# Patient Record
Sex: FEMALE | Race: WHITE | Employment: FULL TIME | URBAN - METROPOLITAN AREA
[De-identification: names, ages, dates, MRNs, and addresses within clinical notes are randomized per-mention and may not be internally consistent; named-entity substitution may affect disease eponyms.]

---

## 2017-01-06 DIAGNOSIS — G43.109 MIGRAINE WITH AURA AND WITHOUT STATUS MIGRAINOSUS, NOT INTRACTABLE: Primary | ICD-10-CM

## 2017-01-06 RX ORDER — FROVATRIPTAN SUCCINATE 2.5 MG/1
2.5 TABLET, FILM COATED ORAL
Qty: 9 TAB | Refills: 5 | Status: SHIPPED | OUTPATIENT
Start: 2017-01-06 | End: 2020-01-07 | Stop reason: ALTCHOICE

## 2017-01-17 ENCOUNTER — DOCUMENTATION ONLY (OUTPATIENT)
Dept: NEUROLOGY | Age: 42
End: 2017-01-17

## 2017-01-17 ENCOUNTER — OFFICE VISIT (OUTPATIENT)
Dept: NEUROLOGY | Age: 42
End: 2017-01-17

## 2017-01-17 VITALS
DIASTOLIC BLOOD PRESSURE: 68 MMHG | HEIGHT: 63 IN | HEART RATE: 104 BPM | WEIGHT: 166 LBS | BODY MASS INDEX: 29.41 KG/M2 | OXYGEN SATURATION: 98 % | SYSTOLIC BLOOD PRESSURE: 130 MMHG

## 2017-01-17 DIAGNOSIS — G43.009 MIGRAINE WITHOUT AURA AND WITHOUT STATUS MIGRAINOSUS, NOT INTRACTABLE: Primary | ICD-10-CM

## 2017-01-17 DIAGNOSIS — T39.95XA ANALGESIC OVERUSE HEADACHE: ICD-10-CM

## 2017-01-17 DIAGNOSIS — F90.9 ATTENTION DEFICIT HYPERACTIVITY DISORDER (ADHD), UNSPECIFIED ADHD TYPE: ICD-10-CM

## 2017-01-17 DIAGNOSIS — G44.40 ANALGESIC OVERUSE HEADACHE: ICD-10-CM

## 2017-01-17 RX ORDER — BUTALBITAL, ACETAMINOPHEN AND CAFFEINE 50; 325; 40 MG/1; MG/1; MG/1
1 TABLET ORAL
Qty: 20 TAB | Refills: 5 | Status: SHIPPED | OUTPATIENT
Start: 2017-01-17 | End: 2018-08-14 | Stop reason: SDUPTHER

## 2017-01-17 RX ORDER — METHYLPHENIDATE HYDROCHLORIDE 5 MG/1
5 TABLET ORAL 2 TIMES DAILY
Qty: 60 TAB | Refills: 0 | Status: SHIPPED | OUTPATIENT
Start: 2017-01-17 | End: 2017-03-20 | Stop reason: SDUPTHER

## 2017-01-17 NOTE — PATIENT INSTRUCTIONS
[facility] [place] [facility]   Statement to Patients  April 1, 2014      In an effort to ensure the large volume of patient prescription refills is processed in the most efficient and expeditious manner, we are asking our patients to assist us by calling your Pharmacy for all prescription refills, this will include also your  Mail Order Pharmacy. The pharmacy will contact our office electronically to continue the refill process. Please do not wait until the last minute to call your pharmacy. We need at least 48 hours (2days) to fill prescriptions. We also encourage you to call your pharmacy before going to  your prescription to make sure it is ready. With regard to controlled substance prescription refill requests (narcotic refills) that need to be picked up at our office, we ask your cooperation by providing us with at least 72 hours (3days) notice that you will need a refill. We will not refill narcotic prescription refill requests after 4:00pm on any weekday, Monday through Thursday, or after 2:00pm on Fridays, or on the weekends. We encourage everyone to explore another way of getting your prescription refill request processed using Make Meaning, our patient web portal through our electronic medical record system. Make Meaning is an efficient and effective way to communicate your medication request directly to the office and  downloadable as an feng on your smart phone . Make Meaning also features a review functionality that allows you to view your medication list as well as leave messages for your physician. Are you ready to get connected? If so please review the attatched instructions or speak to any of our staff to get you set up right away! Thank you so much for your cooperation. Should you have any questions please contact our Practice Administrator.     The Physicians and Staff,  [facility]          A Healthy Lifestyle: Care Instructions  Your Care Instructions  A healthy lifestyle can help you feel good, stay at a healthy weight, and have plenty of energy for both work and play. A healthy lifestyle is something you can share with your whole family. A healthy lifestyle also can lower your risk for serious health problems, such as high blood pressure, heart disease, and diabetes. You can follow a few steps listed below to improve your health and the health of your family. Follow-up care is a key part of your treatment and safety. Be sure to make and go to all appointments, and call your doctor if you are having problems. Its also a good idea to know your test results and keep a list of the medicines you take. How can you care for yourself at home? · Do not eat too much sugar, fat, or fast foods. You can still have dessert and treats now and then. The goal is moderation. · Start small to improve your eating habits. Pay attention to portion sizes, drink less juice and soda pop, and eat more fruits and vegetables. ¨ Eat a healthy amount of food. A 3-ounce serving of meat, for example, is about the size of a deck of cards. Fill the rest of your plate with vegetables and whole grains. ¨ Limit the amount of soda and sports drinks you have every day. Drink more water when you are thirsty. ¨ Eat at least 5 servings of fruits and vegetables every day. It may seem like a lot, but it is not hard to reach this goal. A serving or helping is 1 piece of fruit, 1 cup of vegetables, or 2 cups of leafy, raw vegetables. Have an apple or some carrot sticks as an afternoon snack instead of a candy bar. Try to have fruits and/or vegetables at every meal.  · Make exercise part of your daily routine. You may want to start with simple activities, such as walking, bicycling, or slow swimming. Try to be active 30 to 60 minutes every day. You do not need to do all 30 to 60 minutes all at once. For example, you can exercise 3 times a day for 10 or 20 minutes.  Moderate exercise is safe for most people, but it is always a good idea to talk to your doctor before starting an exercise program.  · Keep moving. Mauricio Sanderster the lawn, work in the garden, or [employer]. Take the stairs instead of the elevator at work. · If you smoke, quit. People who smoke have an increased risk for heart attack, stroke, cancer, and other lung illnesses. Quitting is hard, but there are ways to boost your chance of quitting tobacco for good. ¨ Use nicotine gum, patches, or lozenges. ¨ Ask your doctor about stop-smoking programs and medicines. ¨ Keep trying. In addition to reducing your risk of diseases in the future, you will notice some benefits soon after you stop using tobacco. If you have shortness of breath or asthma symptoms, they will likely get better within a few weeks after you quit. · Limit how much alcohol you drink. Moderate amounts of alcohol (up to 2 drinks a day for men, 1 drink a day for women) are okay. But drinking too much can lead to liver problems, high blood pressure, and other health problems. Family health  If you have a family, there are many things you can do together to improve your health. · Eat meals together as a family as often as possible. · Eat healthy foods. This includes fruits, vegetables, lean meats and dairy, and whole grains. · Include your family in your fitness plan. Most people think of activities such as jogging or tennis as the way to fitness, but there are many ways you and your family can be more active. Anything that makes you breathe hard and gets your heart pumping is exercise. Here are some tips:  ¨ Walk to do errands or to take your child to school or the bus. ¨ Go for a family bike ride after dinner instead of watching TV. Where can you learn more? Go to http://yoly-chelsea.info/. Enter D088 in the search box to learn more about \"A Healthy Lifestyle: Care Instructions. \"  Current as of: July 26, 2016  Content Version: 11.1  © 8582-6445 [employer]. Care instructions adapted under license by [employer]) (which disclaims liability or warranty for this information). If you have questions about a medical condition or this instruction, always ask your healthcare professional. [address] any warranty or liability for your use of this information.

## 2017-01-17 NOTE — MR AVS SNAPSHOT
Visit Information Date & Time Provider Department Dept. Phone Encounter #  
 1/17/2017  2:00 PM Chandra Murray NP [facility] [phone] [id number] Follow-up Instructions Return in about 4 weeks (around 2/14/2017). Upcoming Health Maintenance Date Due DTaP/Tdap/Td series (1 - Tdap) 9/2/1998 PAP AKA CERVICAL CYTOLOGY 9/2/1998 INFLUENZA AGE 9 TO ADULT 8/1/2016 Allergies as of 1/17/2017  Review Complete On: 1/17/2017 By: Ines Higgins LPN Severity Noted Reaction Type Reactions Erythromycin  12/17/2010    Nausea and Vomiting Current Immunizations  Reviewed on 12/19/2012 No immunizations on file. Not reviewed this visit You Were Diagnosed With   
  
 Codes Comments Attention deficit hyperactivity disorder (ADHD), unspecified ADHD type     ICD-10-CM: F90.9 ICD-9-CM: 314.01 Vitals BP Pulse Height(growth percentile) Weight(growth percentile) SpO2 BMI  
 130/68 (!) 104 5' 3\" (1.6 m) 166 lb (75.3 kg) 98% 29.41 kg/m2 OB Status Smoking Status Hysterectomy Former Smoker Vitals History BMI and BSA Data Body Mass Index Body Surface Area  
 29.41 kg/m 2 1.83 m 2 Preferred Pharmacy Pharmacy Name Phone [facility] [place], [facility]. [phone] Your Updated Medication List  
  
   
This list is accurate as of: 1/17/17  2:24 PM.  Always use your most recent med list.  
  
  
  
  
 butalbital-acetaminophen-caffeine -40 mg per tablet Commonly known as:  Sid Gift Take 1 Tab by mouth every six (6) hours as needed for Pain. Max Daily Amount: 2 Tabs. cetirizine 10 mg tablet Commonly known as:  ZYRTEC Take  by mouth daily. FERATE 240 mg (27 mg iron) tablet Generic drug:  ferrous gluconate Take 240 mg by mouth daily. frovatriptan 2.5 mg tablet Commonly known as:  Adele Vasquez Take 1 Tab by mouth once as needed for Migraine. Can take a second dose two hours later, max 2 daily  
  
 gabapentin 600 mg tablet Commonly known as:  NEURONTIN Take 1 Tab by mouth three (3) times daily. hydroCHLOROthiazide 25 mg tablet Commonly known as:  HYDRODIURIL Take 25 mg by mouth daily. methylphenidate 5 mg tablet Commonly known as:  RITALIN Take 1 Tab (5 mg total) by mouth two (2) times a day. Max Daily Amount: 10 mg PREMARIN 1.25 mg tablet Generic drug:  conjugated estrogens Take 1.25 mg by mouth daily. traMADol 300 mg tablet Commonly known as:  ULTRAM-ER Take 300 mg by mouth daily. venlafaxine- mg capsule Commonly known as:  EFFEXOR-XR Take 1 Cap by mouth daily (with breakfast). Prescriptions Printed Refills  
 methylphenidate (RITALIN) 5 mg tablet 0 Sig: Take 1 Tab (5 mg total) by mouth two (2) times a day. Max Daily Amount: 10 mg  
 Class: Print Route: Oral  
 butalbital-acetaminophen-caffeine (FIORICET, ESGIC) -40 mg per tablet 5 Sig: Take 1 Tab by mouth every six (6) hours as needed for Pain. Max Daily Amount: 2 Tabs. Class: Print Route: Oral  
  
Follow-up Instructions Return in about 4 weeks (around 2/14/2017). Patient Instructions PRESCRIPTION REFILL POLICY [facility] Statement to Patients April 1, 2014 In an effort to ensure the large volume of patient prescription refills is processed in the most efficient and expeditious manner, we are asking our patients to assist us by calling your Pharmacy for all prescription refills, this will include also your  Mail Order Pharmacy. The pharmacy will contact our office electronically to continue the refill process. Please do not wait until the last minute to call your pharmacy. We need at least 48 hours (2days) to fill prescriptions.  We also encourage you to call your pharmacy before going to  your prescription to make sure it is ready. With regard to controlled substance prescription refill requests (narcotic refills) that need to be picked up at our office, we ask your cooperation by providing us with at least 72 hours (3days) notice that you will need a refill. We will not refill narcotic prescription refill requests after 4:00pm on any weekday, Monday through Thursday, or after 2:00pm on Fridays, or on the weekends. We encourage everyone to explore another way of getting your prescription refill request processed using Zoomorama, our patient web portal through our electronic medical record system. Zoomorama is an efficient and effective way to communicate your medication request directly to the office and  downloadable as an feng on your smart phone . Zoomorama also features a review functionality that allows you to view your medication list as well as leave messages for your physician. Are you ready to get connected? If so please review the attatched instructions or speak to any of our staff to get you set up right away! Thank you so much for your cooperation. Should you have any questions please contact our Practice Administrator. The Physicians and Staff,  [facility] A Healthy Lifestyle: Care Instructions Your Care Instructions A healthy lifestyle can help you feel good, stay at a healthy weight, and have plenty of energy for both work and play. A healthy lifestyle is something you can share with your whole family. A healthy lifestyle also can lower your risk for serious health problems, such as high blood pressure, heart disease, and diabetes. You can follow a few steps listed below to improve your health and the health of your family. Follow-up care is a key part of your treatment and safety.  Be sure to make and go to all appointments, and call your doctor if you are having problems. Its also a good idea to know your test results and keep a list of the medicines you take. How can you care for yourself at home? · Do not eat too much sugar, fat, or fast foods. You can still have dessert and treats now and then. The goal is moderation. · Start small to improve your eating habits. Pay attention to portion sizes, drink less juice and soda pop, and eat more fruits and vegetables. ¨ Eat a healthy amount of food. A 3-ounce serving of meat, for example, is about the size of a deck of cards. Fill the rest of your plate with vegetables and whole grains. ¨ Limit the amount of soda and sports drinks you have every day. Drink more water when you are thirsty. ¨ Eat at least 5 servings of fruits and vegetables every day. It may seem like a lot, but it is not hard to reach this goal. A serving or helping is 1 piece of fruit, 1 cup of vegetables, or 2 cups of leafy, raw vegetables. Have an apple or some carrot sticks as an afternoon snack instead of a candy bar. Try to have fruits and/or vegetables at every meal. 
· Make exercise part of your daily routine. You may want to start with simple activities, such as walking, bicycling, or slow swimming. Try to be active 30 to 60 minutes every day. You do not need to do all 30 to 60 minutes all at once. For example, you can exercise 3 times a day for 10 or 20 minutes. Moderate exercise is safe for most people, but it is always a good idea to talk to your doctor before starting an exercise program. 
· Keep moving. Olga Lidia Boys the lawn, work in the garden, or [employer]. Take the stairs instead of the elevator at work. · If you smoke, quit. People who smoke have an increased risk for heart attack, stroke, cancer, and other lung illnesses. Quitting is hard, but there are ways to boost your chance of quitting tobacco for good. ¨ Use nicotine gum, patches, or lozenges. ¨ Ask your doctor about stop-smoking programs and medicines. ¨ Keep trying. In addition to reducing your risk of diseases in the future, you will notice some benefits soon after you stop using tobacco. If you have shortness of breath or asthma symptoms, they will likely get better within a few weeks after you quit. · Limit how much alcohol you drink. Moderate amounts of alcohol (up to 2 drinks a day for men, 1 drink a day for women) are okay. But drinking too much can lead to liver problems, high blood pressure, and other health problems. Family health If you have a family, there are many things you can do together to improve your health. · Eat meals together as a family as often as possible. · Eat healthy foods. This includes fruits, vegetables, lean meats and dairy, and whole grains. · Include your family in your fitness plan. Most people think of activities such as jogging or tennis as the way to fitness, but there are many ways you and your family can be more active. Anything that makes you breathe hard and gets your heart pumping is exercise. Here are some tips: 
¨ Walk to do errands or to take your child to school or the bus. ¨ Go for a family bike ride after dinner instead of watching TV. Where can you learn more? Go to http://yoly-chelsea.info/. Enter U110 in the search box to learn more about \"A Healthy Lifestyle: Care Instructions. \" Current as of: July 26, 2016 Content Version: 11.1 © 1117-3963 [employer]. Care instructions adapted under license by [employer] (which disclaims liability or warranty for this information). If you have questions about a medical condition or this instruction, always ask your healthcare professional. [place] [address] any warranty or liability for your use of this information. Introducing [facility]! Dear Milady Sep: Thank you for requesting a [employer] account. Our records indicate that you already have an active [employer] account.   You can access your account anytime at [email or web address] Did you know that you can access your hospital and ER discharge instructions at any time in SimpleMist? You can also review all of your test results from your hospital stay or ER visit. Additional Information If you have questions, please visit the Frequently Asked Questions section of the SimpleMist website at [email or web address] Remember, SimpleMist is NOT to be used for urgent needs. For medical emergencies, dial 911. Now available from your iPhone and Android! Please provide this summary of care documentation to your next provider. Your primary care clinician is listed as TYREE Steele. If you have any questions after today's visit, please call [phone].

## 2017-01-17 NOTE — PROGRESS NOTES
Date:            2017    Name:  Rox Beatty  :  1975  MRN:  [id number]     PCP:  Rustam Padilla MD    Chief Complaint   Patient presents with    Follow-up    Migraine         HISTORY OF PRESENT ILLNESS:  Yanelis West is a 46 y.o., female who presents today for follow up for migraines. She has switched to frova for abortive and think that it works better than relpax did. She has had 13 headache days this month. She took Relpax for the first 12 days month because she had a headache every day. When she ran out she started Frova, and has not had a headache since. She increased her effexor, does feel that she is nicer since starting that. Thinks that she is less stressed. 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 She does not always sleep well, she takes trazodone to help her sleep if she has trouble, does not take it every night. Pain always starts in her neck, and she has that pain today so she is concerned that she has a headache coming. She previously took fioricet as a secondary if her triptan did not work, knows that overuse gave her rebound. She continues to have good control of her attention issues with Ritalin 5 mg twice daily, would like to continue that prescription. Patient never had headaches before she had a hysterectomy , but admits that she has been on high dose of Premarin ever since because of mood issues after the hysterectomy. 11.15.2016 miesha Bowers is a 46 y.o., female who presents today for follow up for migraines. Previously these were well controlled but she feels that these have been getting worse, she wonders if this is seasonal.  Up until recently, she had one per week, now she has 2-3 per week. She will take a relpax, which works, and then headache often returns 24-36 hours later. She has been in the ER frequently over the last few months because she doesn't get enough relpax.  She does not sleep well, feels that this triggers her headaches. Migraines weren't really an issue until after she get a hysterectomy. Does take premarin, but did not start this right after her hysterectomy does not think that it causes her headaches. She tried topamax, but it gave her significant STM issues. She thinks that she tried amitriptyline and that it didn't help. Has not tried propranolol or other BP medicine, but BP is not high enough today to try one. Does have work related stress, she is an . She also has ADHD, takes ritalin which she feels is managing that. She did not tolerate the Adderall, it made her feel too slow. Agrees that she might benefit from CBT but doesn't have time for it. She was sent for physical therapy for lower back pain, but has not had time to schedule that. Current Outpatient Prescriptions   Medication Sig    frovatriptan (FROVA) 2.5 mg tablet Take 1 Tab by mouth once as needed for Migraine. Can take a second dose two hours later, max 2 daily    cetirizine (ZYRTEC) 10 mg tablet Take  by mouth daily.  ferrous gluconate (FERATE) 240 mg (27 mg iron) tablet Take 240 mg by mouth daily.  traMADol (ULTRAM-ER) 300 mg tablet Take 300 mg by mouth daily.  hydroCHLOROthiazide (HYDRODIURIL) 25 mg tablet Take 25 mg by mouth daily.  methylphenidate (RITALIN) 5 mg tablet Take 1 Tab by mouth two (2) times a day. Max Daily Amount: 10 mg.    venlafaxine-SR (EFFEXOR-XR) 150 mg capsule Take 1 Cap by mouth daily (with breakfast).  conjugated estrogens (PREMARIN) 1.25 mg tablet Take 1.25 mg by mouth daily.  gabapentin (NEURONTIN) 600 mg tablet Take 1 Tab by mouth three (3) times daily. No current facility-administered medications for this visit.       Allergies   Allergen Reactions    Erythromycin Nausea and Vomiting     Past Medical History   Diagnosis Date    Anxiety     Arthritis      back    Asthma      has not required tx in past 10 years    Chronic pain      lumbar    Dyspepsia and other specified disorders of function of stomach     Hypertension     Migraine     Nausea & vomiting      Past Surgical History   Procedure Laterality Date    Hx gyn       hysterectomy    Hx gi  12/18/12      Laparoscopy, open right colectomy    Hx urological       bladder sling 2014 and jan 19 2015    Pr abdomen surgery proc unlisted       bowel resection 2012     Social History     Social History    Marital status:      Spouse name: N/A    Number of children: N/A    Years of education: N/A     Occupational History    Not on file. Social History Main Topics    Smoking status: Former Smoker    Smokeless tobacco: Not on file    Alcohol use No    Drug use: No    Sexual activity: Not on file     Other Topics Concern    Not on file     Social History Narrative     Family History   Problem Relation Age of Onset    Hypertension Mother     Heart Disease Maternal Grandmother     Heart Disease Maternal Grandfather     Stroke Maternal Grandfather     Cancer Maternal Grandfather      colon, prostate    Cancer Paternal Grandmother      ovarian         PHYSICAL EXAMINATION:    Visit Vitals    Ht 5' 3\" (1.6 m)    Wt 166 lb (75.3 kg)    BMI 29.41 kg/m2     General:  Well defined, nourished, and groomed individual in no acute distress. Neck: Supple, nontender, no bruits, no pain with resistance to active range of motion. Heart: Regular rate and rhythm, no murmurs, rub, or gallop. Normal S1S2. Lungs:  Clear to auscultation bilaterally with equal chest expansion, no cough, no wheeze  Musculoskeletal:  Extremities revealed no edema and had full range of motion of joints. Psych:  Good mood and bright affect    NEUROLOGICAL EXAMINATION:     Mental Status:   Alert and oriented to person, place, and time with recent and remote memory intact. Attention span and concentration are normal. Speech is fluent with a full fund of knowledge.       Cranial Nerves:    II, III, IV, VI: Visual acuity grossly intact. Pupils are equal, round, and reactive to light. Extra-ocular movements are full and fluid. No ptosis or nystagmus. V-XII: Hearing is grossly intact. Facial features are symmetric, with normal sensation and strength. The palate rises symmetrically and the tongue protrudes midline. Sternocleidomastoids 5/5. Motor Examination: Normal tone, bulk, and strength, 5/5 muscle strength throughout. Coordination:  Finger to nose testing was normal.   No resting or intention tremor  Gait and Station:  Steady while walking and with tandem walking. Normal arm swing. No pronator drift. No muscle wasting or fasciculations noted. ASSESSMENT AND PLAN    ICD-10-CM ICD-9-CM    1. Migraine without aura and without status migrainosus, not intractable G43.009 346.10    2. Attention deficit hyperactivity disorder (ADHD), unspecified ADHD type F90.9 314.01 methylphenidate (RITALIN) 5 mg tablet   3. Analgesic overuse headache T39.91XA 339.3     G44.40 E80.9      42-year-old female seen in follow-up for migraines. Relpax was unsuccessful abortive therapy, but she switch to Frova earlier this month and is found that to be more effective. With her Relpax she would find that her headache returned the following day. Since taking Frova 1 week ago, she has not had return of her headache. She increase her Effexor to 150 mg daily for both anxiety and headache prevention, does feel that it has made her more pleasant and less stressed. Admits that she does not always sleep well, but does take trazodone as needed for that. Attention issues are well controlled with current dose of Ritalin. She knows that she is tried Fioricet in the past for abortive therapy when her triptan did not work, would like to try that again. 1.  Continue Frova 2.5 mg for migraine abortive, advised patient that she can also take 440 mg of naproxen with her Frova to help her be more effective.   If that fails, patient can use Fioricet as a second medication. Advised her to limit use to no more than 5 days per month to avoid rebound headache. 2.  Continue Effexor 150 mg XR daily for both anxiety and headache prevention  3. Discussed other preventative options if we are unable to get her headaches under control, will try low-dose propranolol next as patient has not tried in the past  4. Discussed that she may benefit from Botox if she fails another preventative line and continues to have 9+ headache days per month  5. Continue Ritalin 5 mg twice daily, patient was on this dose with Dr. Kennedy Even in his prior practice  6. Patient will discuss with her gynecologist if she could lower her dose of Premarin, discussed that this may be causing her worsening her headaches  7.   Encourage patient to continue conservative management techniques, sleep hygiene is important, should schedule trigger point massage for neck tension    Follow-up in 4 weeks, call sooner with concerns    Francisco Jara NP

## 2017-03-15 DIAGNOSIS — G43.009 MIGRAINE WITHOUT AURA AND WITHOUT STATUS MIGRAINOSUS, NOT INTRACTABLE: ICD-10-CM

## 2017-03-15 DIAGNOSIS — F41.9 ANXIETY: ICD-10-CM

## 2017-03-15 NOTE — TELEPHONE ENCOUNTER
Requested Prescriptions     Pending Prescriptions Disp Refills    venlafaxine-SR (EFFEXOR-XR) 150 mg capsule 90 Cap 3     Sig: Take 1 Cap by mouth daily (with breakfast). Pharmacy would like 90 day prescription.

## 2017-03-16 RX ORDER — VENLAFAXINE HYDROCHLORIDE 150 MG/1
150 CAPSULE, EXTENDED RELEASE ORAL
Qty: 90 CAP | Refills: 3 | Status: SHIPPED | OUTPATIENT
Start: 2017-03-16 | End: 2017-05-17 | Stop reason: SDUPTHER

## 2017-03-20 ENCOUNTER — OFFICE VISIT (OUTPATIENT)
Dept: NEUROLOGY | Age: 42
End: 2017-03-20

## 2017-03-20 VITALS
BODY MASS INDEX: 29.59 KG/M2 | OXYGEN SATURATION: 97 % | HEART RATE: 91 BPM | HEIGHT: 63 IN | WEIGHT: 167 LBS | SYSTOLIC BLOOD PRESSURE: 112 MMHG | DIASTOLIC BLOOD PRESSURE: 64 MMHG

## 2017-03-20 DIAGNOSIS — G43.009 MIGRAINE WITHOUT AURA AND WITHOUT STATUS MIGRAINOSUS, NOT INTRACTABLE: Primary | ICD-10-CM

## 2017-03-20 DIAGNOSIS — F90.9 ATTENTION DEFICIT HYPERACTIVITY DISORDER (ADHD), UNSPECIFIED ADHD TYPE: ICD-10-CM

## 2017-03-20 RX ORDER — METHYLPHENIDATE HYDROCHLORIDE 5 MG/1
5 TABLET ORAL 2 TIMES DAILY
Qty: 60 TAB | Refills: 0 | Status: SHIPPED | OUTPATIENT
Start: 2017-03-20 | End: 2017-04-18 | Stop reason: SDUPTHER

## 2017-03-20 RX ORDER — PROPRANOLOL HYDROCHLORIDE 10 MG/1
10 TABLET ORAL 2 TIMES DAILY
Qty: 90 TAB | Refills: 5 | Status: SHIPPED | OUTPATIENT
Start: 2017-03-20 | End: 2017-05-09 | Stop reason: SDUPTHER

## 2017-03-20 NOTE — PATIENT INSTRUCTIONS
Propranolol (Inderal) 10 mg tabs: Take 1 tab at bedtime for 1 week, take 1 tab twice a day. If tolerated, you can increase to 1 tab 3 times a day    Check your blood pressure and heart rate when taking this medication, if your blood pressure is getting low and especially if you are getting dizzy call our office to discuss adjusting your dose. You can also call your PCP to ask whether you need to stay on hydrochlorothiazide when taking this    [facility]   Statement to Patients  April 1, 2014      In an effort to ensure the large volume of patient prescription refills is processed in the most efficient and expeditious manner, we are asking our patients to assist us by calling your Pharmacy for all prescription refills, this will include also your  Mail Order Pharmacy. The pharmacy will contact our office electronically to continue the refill process. Please do not wait until the last minute to call your pharmacy. We need at least 48 hours (2days) to fill prescriptions. We also encourage you to call your pharmacy before going to  your prescription to make sure it is ready. With regard to controlled substance prescription refill requests (narcotic refills) that need to be picked up at our office, we ask your cooperation by providing us with at least 72 hours (3days) notice that you will need a refill. We will not refill narcotic prescription refill requests after 4:00pm on any weekday, Monday through Thursday, or after 2:00pm on Fridays, or on the weekends. We encourage everyone to explore another way of getting your prescription refill request processed using [employer], our patient web portal through our electronic medical record system. [employer] is an efficient and effective way to communicate your medication request directly to the office and  downloadable as an feng on your smart phone .  [employer] also features a review functionality that allows you to view your medication list as well as leave messages for your physician. Are you ready to get connected? If so please review the attatched instructions or speak to any of our staff to get you set up right away! Thank you so much for your cooperation. Should you have any questions please contact our Practice Administrator. The Physicians and Staff,  [facility]          A Healthy Lifestyle: Care Instructions  Your Care Instructions  A healthy lifestyle can help you feel good, stay at a healthy weight, and have plenty of energy for both work and play. A healthy lifestyle is something you can share with your whole family. A healthy lifestyle also can lower your risk for serious health problems, such as high blood pressure, heart disease, and diabetes. You can follow a few steps listed below to improve your health and the health of your family. Follow-up care is a key part of your treatment and safety. Be sure to make and go to all appointments, and call your doctor if you are having problems. Its also a good idea to know your test results and keep a list of the medicines you take. How can you care for yourself at home? · Do not eat too much sugar, fat, or fast foods. You can still have dessert and treats now and then. The goal is moderation. · Start small to improve your eating habits. Pay attention to portion sizes, drink less juice and soda pop, and eat more fruits and vegetables. ¨ Eat a healthy amount of food. A 3-ounce serving of meat, for example, is about the size of a deck of cards. Fill the rest of your plate with vegetables and whole grains. ¨ Limit the amount of soda and sports drinks you have every day. Drink more water when you are thirsty. ¨ Eat at least 5 servings of fruits and vegetables every day. It may seem like a lot, but it is not hard to reach this goal. A serving or helping is 1 piece of fruit, 1 cup of vegetables, or 2 cups of leafy, raw vegetables.  Have an apple or some carrot sticks as an afternoon snack instead of a candy bar. Try to have fruits and/or vegetables at every meal.  · Make exercise part of your daily routine. You may want to start with simple activities, such as walking, bicycling, or slow swimming. Try to be active 30 to 60 minutes every day. You do not need to do all 30 to 60 minutes all at once. For example, you can exercise 3 times a day for 10 or 20 minutes. Moderate exercise is safe for most people, but it is always a good idea to talk to your doctor before starting an exercise program.  · Keep moving. Trish Jason the lawn, work in the garden, or [employer]. Take the stairs instead of the elevator at work. · If you smoke, quit. People who smoke have an increased risk for heart attack, stroke, cancer, and other lung illnesses. Quitting is hard, but there are ways to boost your chance of quitting tobacco for good. ¨ Use nicotine gum, patches, or lozenges. ¨ Ask your doctor about stop-smoking programs and medicines. ¨ Keep trying. In addition to reducing your risk of diseases in the future, you will notice some benefits soon after you stop using tobacco. If you have shortness of breath or asthma symptoms, they will likely get better within a few weeks after you quit. · Limit how much alcohol you drink. Moderate amounts of alcohol (up to 2 drinks a day for men, 1 drink a day for women) are okay. But drinking too much can lead to liver problems, high blood pressure, and other health problems. Family health  If you have a family, there are many things you can do together to improve your health. · Eat meals together as a family as often as possible. · Eat healthy foods. This includes fruits, vegetables, lean meats and dairy, and whole grains. · Include your family in your fitness plan. Most people think of activities such as jogging or tennis as the way to fitness, but there are many ways you and your family can be more active.  Anything that makes you breathe hard and gets your heart pumping is exercise. Here are some tips:  ¨ Walk to do errands or to take your child to school or the bus. ¨ Go for a family bike ride after dinner instead of watching TV. Where can you learn more? Go to http://yoly-chelsea.info/. Enter D561 in the search box to learn more about \"A Healthy Lifestyle: Care Instructions. \"  Current as of: July 26, 2016  Content Version: 11.1  © 5871-9581 [employer]. Care instructions adapted under license by [employer] (which disclaims liability or warranty for this information). If you have questions about a medical condition or this instruction, always ask your healthcare professional. [address] any warranty or liability for your use of this information.

## 2017-03-20 NOTE — MR AVS SNAPSHOT
Visit Information Date & Time Provider Department Dept. Phone Encounter #  
 3/20/2017  9:00 AM Ricco Duarte NP [facility] [phone] [id number] Upcoming Health Maintenance Date Due DTaP/Tdap/Td series (1 - Tdap) 9/2/1998 PAP AKA CERVICAL CYTOLOGY 9/2/1998 INFLUENZA AGE 9 TO ADULT 8/1/2016 Allergies as of 3/20/2017  Review Complete On: 3/20/2017 By: Lexx Fernandez LPN Severity Noted Reaction Type Reactions Erythromycin  12/17/2010    Nausea and Vomiting Current Immunizations  Reviewed on 12/19/2012 No immunizations on file. Not reviewed this visit You Were Diagnosed With   
  
 Codes Comments Migraine without aura and without status migrainosus, not intractable    -  Primary ICD-10-CM: G43.009 ICD-9-CM: 346.10 Vitals BP Pulse Height(growth percentile) Weight(growth percentile) SpO2 BMI  
 112/64 91 5' 3\" (1.6 m) 167 lb (75.8 kg) 97% 29.58 kg/m2 OB Status Smoking Status Hysterectomy Former Smoker Vitals History BMI and BSA Data Body Mass Index Body Surface Area  
 29.58 kg/m 2 1.84 m 2 Preferred Pharmacy Pharmacy Name Phone Desiree [facility] [address], [address] [place] [address]. [phone] Your Updated Medication List  
  
   
This list is accurate as of: 3/20/17  9:24 AM.  Always use your most recent med list.  
  
  
  
  
 butalbital-acetaminophen-caffeine -40 mg per tablet Commonly known as:  Laveda Louisa Take 1 Tab by mouth every six (6) hours as needed for Pain. Max Daily Amount: 2 Tabs. cetirizine 10 mg tablet Commonly known as:  ZYRTEC Take  by mouth daily. FERATE 240 mg (27 mg iron) tablet Generic drug:  ferrous gluconate Take 240 mg by mouth daily. frovatriptan 2.5 mg tablet Commonly known as:  Camilo Bong Take 1 Tab by mouth once as needed for Migraine.  Can take a second dose two hours later, max 2 daily  
  
 gabapentin 600 mg tablet Commonly known as:  NEURONTIN Take 1 Tab by mouth three (3) times daily. hydroCHLOROthiazide 25 mg tablet Commonly known as:  HYDRODIURIL Take 25 mg by mouth daily. methylphenidate 5 mg tablet Commonly known as:  RITALIN Take 1 Tab (5 mg total) by mouth two (2) times a day. Max Daily Amount: 10 mg PREMARIN 1.25 mg tablet Generic drug:  conjugated estrogens Take 1.25 mg by mouth daily. propranolol 10 mg tablet Commonly known as:  INDERAL Take 1 Tab by mouth two (2) times a day. traMADol 300 mg tablet Commonly known as:  ULTRAM-ER Take 300 mg by mouth daily. venlafaxine- mg capsule Commonly known as:  EFFEXOR-XR Take 1 Cap by mouth daily (with breakfast). Prescriptions Sent to Pharmacy Refills  
 propranolol (INDERAL) 10 mg tablet 5 Sig: Take 1 Tab by mouth two (2) times a day. Class: Normal  
 Pharmacy: [address]  #: [phone] Route: Oral  
  
Patient Instructions Propranolol (Inderal) 10 mg tabs: Take 1 tab at bedtime for 1 week, take 1 tab twice a day. If tolerated, you can increase to 1 tab 3 times a day Check your blood pressure and heart rate when taking this medication, if your blood pressure is getting low and especially if you are getting dizzy call our office to discuss adjusting your dose. You can also call your PCP to ask whether you need to stay on hydrochlorothiazide when taking this PRESCRIPTION REFILL POLICY [facility] Statement to Patients April 1, 2014 In an effort to ensure the large volume of patient prescription refills is processed in the most efficient and expeditious manner, we are asking our patients to assist us by calling your Pharmacy for all prescription refills, this will include also your  Mail Order Pharmacy.  The pharmacy will contact our office electronically to continue the refill process. Please do not wait until the last minute to call your pharmacy. We need at least 48 hours (2days) to fill prescriptions. We also encourage you to call your pharmacy before going to  your prescription to make sure it is ready. With regard to controlled substance prescription refill requests (narcotic refills) that need to be picked up at our office, we ask your cooperation by providing us with at least 72 hours (3days) notice that you will need a refill. We will not refill narcotic prescription refill requests after 4:00pm on any weekday, Monday through Thursday, or after 2:00pm on Fridays, or on the weekends. We encourage everyone to explore another way of getting your prescription refill request processed using [employer], our patient web portal through our electronic medical record system. [employer] is an efficient and effective way to communicate your medication request directly to the office and  downloadable as an feng on your smart phone . [employer] also features a review functionality that allows you to view your medication list as well as leave messages for your physician. Are you ready to get connected? If so please review the attatched instructions or speak to any of our staff to get you set up right away! Thank you so much for your cooperation. Should you have any questions please contact our Practice Administrator. The Physicians and Staff,  [facility] A Healthy Lifestyle: Care Instructions Your Care Instructions A healthy lifestyle can help you feel good, stay at a healthy weight, and have plenty of energy for both work and play. A healthy lifestyle is something you can share with your whole family. A healthy lifestyle also can lower your risk for serious health problems, such as high blood pressure, heart disease, and diabetes. You can follow a few steps listed below to improve your health and the health of your family. Follow-up care is a key part of your treatment and safety. Be sure to make and go to all appointments, and call your doctor if you are having problems. Its also a good idea to know your test results and keep a list of the medicines you take. How can you care for yourself at home? · Do not eat too much sugar, fat, or fast foods. You can still have dessert and treats now and then. The goal is moderation. · Start small to improve your eating habits. Pay attention to portion sizes, drink less juice and soda pop, and eat more fruits and vegetables. ¨ Eat a healthy amount of food. A 3-ounce serving of meat, for example, is about the size of a deck of cards. Fill the rest of your plate with vegetables and whole grains. ¨ Limit the amount of soda and sports drinks you have every day. Drink more water when you are thirsty. ¨ Eat at least 5 servings of fruits and vegetables every day. It may seem like a lot, but it is not hard to reach this goal. A serving or helping is 1 piece of fruit, 1 cup of vegetables, or 2 cups of leafy, raw vegetables. Have an apple or some carrot sticks as an afternoon snack instead of a candy bar. Try to have fruits and/or vegetables at every meal. 
· Make exercise part of your daily routine. You may want to start with simple activities, such as walking, bicycling, or slow swimming. Try to be active 30 to 60 minutes every day. You do not need to do all 30 to 60 minutes all at once. For example, you can exercise 3 times a day for 10 or 20 minutes. Moderate exercise is safe for most people, but it is always a good idea to talk to your doctor before starting an exercise program. 
· Keep moving. [place] Feeling the lawn, work in the garden, or [employer]. Take the stairs instead of the elevator at work. · If you smoke, quit.  People who smoke have an increased risk for heart attack, stroke, cancer, and other lung illnesses. Quitting is hard, but there are ways to boost your chance of quitting tobacco for good. ¨ Use nicotine gum, patches, or lozenges. ¨ Ask your doctor about stop-smoking programs and medicines. ¨ Keep trying. In addition to reducing your risk of diseases in the future, you will notice some benefits soon after you stop using tobacco. If you have shortness of breath or asthma symptoms, they will likely get better within a few weeks after you quit. · Limit how much alcohol you drink. Moderate amounts of alcohol (up to 2 drinks a day for men, 1 drink a day for women) are okay. But drinking too much can lead to liver problems, high blood pressure, and other health problems. Family health If you have a family, there are many things you can do together to improve your health. · Eat meals together as a family as often as possible. · Eat healthy foods. This includes fruits, vegetables, lean meats and dairy, and whole grains. · Include your family in your fitness plan. Most people think of activities such as jogging or tennis as the way to fitness, but there are many ways you and your family can be more active. Anything that makes you breathe hard and gets your heart pumping is exercise. Here are some tips: 
¨ Walk to do errands or to take your child to school or the bus. ¨ Go for a family bike ride after dinner instead of watching TV. Where can you learn more? Go to http://yoly-chelsea.info/. Enter C662 in the search box to learn more about \"A Healthy Lifestyle: Care Instructions. \" Current as of: July 26, 2016 Content Version: 11.1 © 4523-4004 [employer]. Care instructions adapted under license by [employer] (which disclaims liability or warranty for this information).  If you have questions about a medical condition or this instruction, always ask your healthcare professional. [employer] disclaims any warranty or liability for your use of this information. Introducing [facility]! Dear Domi Handy: Thank you for requesting a [employer] account. Our records indicate that you already have an active [employer] account. You can access your account anytime at [email or web address] Did you know that you can access your hospital and ER discharge instructions at any time in [employer]? You can also review all of your test results from your hospital stay or ER visit. Additional Information If you have questions, please visit the Frequently Asked Questions section of the [employer] website at [email or web address] Remember, [employer] is NOT to be used for urgent needs. For medical emergencies, dial 911. Now available from your iPhone and Android! Please provide this summary of care documentation to your next provider. Your primary care clinician is listed as TYREE Steele. If you have any questions after today's visit, please call [phone].

## 2017-03-20 NOTE — PROGRESS NOTES
Date:             2017    Name:  Jo Ann Sharpe  :  1975  MRN:  [id number]     PCP:  Juan Tyson MD    Chief Complaint   Patient presents with    Follow-up    Migraine         HISTORY OF PRESENT ILLNESS:  Mindy West is a 46 y.o., female who presents today for follow up for migraines. Last month was really bad for her headaches, this month seems to be better so far. She has at least 9 migraines per month despite using effexor for preventative, last month she knows that she had more than 9 because she ran out of her Frova. She did not tolerate topamax, amitriptyline did not work. She has not had her ritalin because she missed an appointment that month, she has been struggling with her attention. 2017 miesha Dumas is a 46 y.o., female who presents today for follow up for migraines. She has switched to frova for abortive and think that it works better than relpax did. She has had 13 headache days this month. She took Relpax for the first 12 days month because she had a headache every day. When she ran out she started Frova, and has not had a headache since. She increased her effexor, does feel that she is nicer since starting that. Thinks that she is less stressed. 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 She does not always sleep well, she takes trazodone to help her sleep if she has trouble, does not take it every night. Pain always starts in her neck, and she has that pain today so she is concerned that she has a headache coming. She previously took fioricet as a secondary if her triptan did not work, knows that overuse gave her rebound. She continues to have good control of her attention issues with Ritalin 5 mg twice daily, would like to continue that prescription.   Patient never had headaches before she had a hysterectomy , but admits that she has been on high dose of Premarin ever since because of mood issues after the hysterectomy.        Current Outpatient Prescriptions   Medication Sig    venlafaxine-SR (EFFEXOR-XR) 150 mg capsule Take 1 Cap by mouth daily (with breakfast).  methylphenidate (RITALIN) 5 mg tablet Take 1 Tab (5 mg total) by mouth two (2) times a day. Max Daily Amount: 10 mg    butalbital-acetaminophen-caffeine (FIORICET, ESGIC) -40 mg per tablet Take 1 Tab by mouth every six (6) hours as needed for Pain. Max Daily Amount: 2 Tabs.  frovatriptan (FROVA) 2.5 mg tablet Take 1 Tab by mouth once as needed for Migraine. Can take a second dose two hours later, max 2 daily    cetirizine (ZYRTEC) 10 mg tablet Take  by mouth daily.  ferrous gluconate (FERATE) 240 mg (27 mg iron) tablet Take 240 mg by mouth daily.  traMADol (ULTRAM-ER) 300 mg tablet Take 300 mg by mouth daily.  hydroCHLOROthiazide (HYDRODIURIL) 25 mg tablet Take 25 mg by mouth daily.  conjugated estrogens (PREMARIN) 1.25 mg tablet Take 1.25 mg by mouth daily.  gabapentin (NEURONTIN) 600 mg tablet Take 1 Tab by mouth three (3) times daily. No current facility-administered medications for this visit. Allergies   Allergen Reactions    Erythromycin Nausea and Vomiting     Past Medical History:   Diagnosis Date    Anxiety     Arthritis     back    Asthma     has not required tx in past 10 years    Chronic pain     lumbar    Dyspepsia and other specified disorders of function of stomach     Hypertension     Migraine     Nausea & vomiting      Past Surgical History:   Procedure Laterality Date    ABDOMEN SURGERY PROC UNLISTED      bowel resection 2012    HX GI  12/18/12     Laparoscopy, open right colectomy    HX GYN      hysterectomy    HX UROLOGICAL      bladder sling 2014 and jan 19 2015     Social History     Social History    Marital status:      Spouse name: N/A    Number of children: N/A    Years of education: N/A     Occupational History    Not on file.      Social History Main Topics    Smoking status: Former Smoker    Smokeless tobacco: Not on file    Alcohol use No    Drug use: No    Sexual activity: Not on file     Other Topics Concern    Not on file     Social History Narrative     Family History   Problem Relation Age of Onset    Hypertension Mother     Heart Disease Maternal Grandmother     Heart Disease Maternal Grandfather     Stroke Maternal Grandfather     Cancer Maternal Grandfather      colon, prostate    Cancer Paternal Grandmother      ovarian         PHYSICAL EXAMINATION:    Visit Vitals    /64    Pulse 91    Ht 5' 3\" (1.6 m)    Wt 167 lb (75.8 kg)    SpO2 97%    BMI 29.58 kg/m2     General:  Well defined, nourished, and groomed individual in no acute distress. Neck: Supple, nontender, no bruits, no pain with resistance to active range of motion. Heart: Regular rate and rhythm, no murmurs, rub, or gallop. Normal S1S2. Lungs:  Clear to auscultation bilaterally with equal chest expansion, no cough, no wheeze  Musculoskeletal:  Extremities revealed no edema and had full range of motion of joints. Psych:  Good mood and bright affect    NEUROLOGICAL EXAMINATION:     Mental Status:   Alert and oriented to person, place, and time with recent and remote memory intact. Attention span and concentration are normal. Speech is fluent with a full fund of knowledge. Cranial Nerves:    II, III, IV, VI:  Visual acuity grossly intact. Pupils are equal, round, and reactive to light. Extra-ocular movements are full and fluid. No ptosis or nystagmus. V-XII: Hearing is grossly intact. Facial features are symmetric, with normal sensation and strength. The palate rises symmetrically and the tongue protrudes midline. Sternocleidomastoids 5/5. Motor Examination: Normal tone, bulk, and strength, 5/5 muscle strength throughout.    Coordination:  Finger to nose testing was normal.   No resting or intention tremor  Gait and Station:  Steady while walking and with tandem walking. Normal arm swing. No pronator drift. No muscle wasting or fasciculations noted. ASSESSMENT AND PLAN    ICD-10-CM ICD-9-CM    1. Migraine without aura and without status migrainosus, not intractable G43.009 346.10 propranolol (INDERAL) 10 mg tablet   2. Attention deficit hyperactivity disorder (ADHD), unspecified ADHD type F90.9 314.01 methylphenidate (RITALIN) 5 mg tablet     42-year-old female seen in follow-up for migraines. Even with preventative therapy, she still has greater than 9 migraine headache days per month. Effexor does help with mood, has not helped with headaches. She is already tried and failed Topamax, amitriptyline, gabapentin for preventative. She has never tried any blood pressure medicine, although she is on hydrochlorothiazide. She was unable to get her Ritalin last month because she missed an appointment, she has been having a lot of issues with her attention at work. 1.  Continue Effexor 150 mg ER daily for  mood  and migraine prevention  2. We will start propranolol first at 10 mg nightly, then 10 mg twice daily. If tolerated, she can increase to 10 mg 3 times daily for migraine prevention she will monitor her blood pressure at work, if she finds that it is getting low she will cut her hydrochlorothiazide in half, and then may call her PCP and discuss discontinuing it. 3.  Continue Frova 2.5 mg as needed for migraine abortive  4. Continue Ritalin 5 mg twice daily for attention  5. Discussed with patient that if headaches are not well controlled with the addition of propranolol, she may need to consider Botox for migraine prevention. Discussed chronic migraine injection schedule, risks associated with the procedure, and that she will need 2-3 rounds of injections to see any improvement in her headaches    Follow-up in 8 weeks, call sooner with concerns    Niurka Leon NP    This note was created using voice recognition software.  Despite editing, there may be syntax errors.

## 2017-04-18 DIAGNOSIS — F90.9 ATTENTION DEFICIT HYPERACTIVITY DISORDER (ADHD), UNSPECIFIED ADHD TYPE: ICD-10-CM

## 2017-04-18 RX ORDER — METHYLPHENIDATE HYDROCHLORIDE 5 MG/1
5 TABLET ORAL 2 TIMES DAILY
Qty: 60 TAB | Refills: 0 | Status: SHIPPED | OUTPATIENT
Start: 2017-04-18 | End: 2017-05-17 | Stop reason: SDUPTHER

## 2017-04-18 NOTE — TELEPHONE ENCOUNTER
Requested Prescriptions     Pending Prescriptions Disp Refills    methylphenidate (RITALIN) 5 mg tablet 60 Tab 0     Sig: Take 1 Tab (5 mg total) by mouth two (2) times a day.   Max Daily Amount: 10 mg

## 2017-04-23 ENCOUNTER — APPOINTMENT (OUTPATIENT)
Dept: GENERAL RADIOLOGY | Age: 42
End: 2017-04-23
Attending: EMERGENCY MEDICINE

## 2017-04-23 ENCOUNTER — HOSPITAL ENCOUNTER (EMERGENCY)
Age: 42
Discharge: HOME | End: 2017-04-23
Attending: EMERGENCY MEDICINE

## 2017-04-23 VITALS
SYSTOLIC BLOOD PRESSURE: 117 MMHG | HEART RATE: 77 BPM | OXYGEN SATURATION: 100 % | WEIGHT: 165 LBS | BODY MASS INDEX: 27.49 KG/M2 | HEIGHT: 65 IN | RESPIRATION RATE: 16 BRPM | DIASTOLIC BLOOD PRESSURE: 69 MMHG | TEMPERATURE: 97.7 F

## 2017-04-23 DIAGNOSIS — S16.1XXA CERVICAL STRAIN, INITIAL ENCOUNTER: Primary | ICD-10-CM

## 2017-04-23 RX ORDER — METHOCARBAMOL 500 MG/1
500 TABLET, FILM COATED ORAL 4 TIMES DAILY
Qty: 30 TAB | Refills: 0 | Status: SHIPPED | OUTPATIENT
Start: 2017-04-23 | End: 2017-05-03

## 2017-04-23 RX ORDER — BACLOFEN 10 MG/1
TABLET ORAL 4 TIMES DAILY
COMMUNITY
End: 2021-09-16

## 2017-04-23 RX ORDER — IBUPROFEN 600 MG/1
600 TABLET ORAL
Qty: 20 TAB | Refills: 0 | Status: SHIPPED | OUTPATIENT
Start: 2017-04-23 | End: 2020-04-10 | Stop reason: ALTCHOICE

## 2017-04-23 NOTE — LETTER
NOTIFICATION RETURN TO WORK / SCHOOL 
 
4/23/2017 1:04 PM 
 
Ms. Cristina Mann [place] [address] [phone] To Whom It May Concern: 
 
Gay West is currently under the care of [address] [place] [address]. She will return to work/school on: 4/25/17 If there are questions or concerns please have the patient contact our office. Sincerely, Brandi Gramajo.  DO

## 2017-04-23 NOTE — UC PROVIDER NOTE
Patient is a 46 y.o. female presenting with neck pain. The history is provided by the patient. Neck Pain    This is a new problem. The current episode started more than 1 week ago. The problem occurs constantly. The problem has not changed since onset. The pain is associated with nothing. There has been no fever. The pain is present in the generalized neck. The quality of the pain is described as aching. The pain is at a severity of 8/10. The symptoms are aggravated by bending, twisting and certain positions. Stiffness is present all day. She has tried NSAIDs and analgesics for the symptoms. The treatment provided no relief. Past Medical History:   Diagnosis Date    Anxiety     Arthritis     back    Asthma     has not required tx in past 10 years    Chronic pain     lumbar    Dyspepsia and other specified disorders of function of stomach     Hypertension     Migraine     Nausea & vomiting         Past Surgical History:   Procedure Laterality Date    ABDOMEN SURGERY PROC UNLISTED      bowel resection 2012    HX GI  12/18/12     Laparoscopy, open right colectomy    HX GYN      hysterectomy    HX UROLOGICAL      bladder sling 2014 and jan 19 2015         Family History   Problem Relation Age of Onset    Hypertension Mother     Heart Disease Maternal Grandmother     Heart Disease Maternal Grandfather     Stroke Maternal Grandfather     Cancer Maternal Grandfather      colon, prostate    Cancer Paternal Grandmother      ovarian        Social History     Social History    Marital status:      Spouse name: N/A    Number of children: N/A    Years of education: N/A     Occupational History    Not on file.      Social History Main Topics    Smoking status: Former Smoker    Smokeless tobacco: Not on file    Alcohol use No    Drug use: No    Sexual activity: Not on file     Other Topics Concern    Not on file     Social History Narrative                ALLERGIES: Erythromycin    Review of Systems   Constitutional: Negative. Musculoskeletal: Positive for neck pain and neck stiffness. Skin: Negative. Neurological: Negative. Vitals:    04/23/17 1148   BP: 117/69   Pulse: 77   Resp: 16   Temp: 97.7 °F (36.5 °C)   SpO2: 100%   Weight: 74.8 kg (165 lb)   Height: 5' 5\" (1.651 m)       Physical Exam   Constitutional: She is oriented to person, place, and time. She appears well-developed and well-nourished. HENT:   Head: Normocephalic and atraumatic. Right Ear: External ear normal.   Left Ear: External ear normal.   Mouth/Throat: Oropharynx is clear and moist. No oropharyngeal exudate. Eyes: Conjunctivae are normal. Pupils are equal, round, and reactive to light. Neck: Normal range of motion. No tracheal deviation present. No thyromegaly present. Generalized increased posterior cervical musculature tension and pain with mildly limited ROM (most pain noted on rt rotation) some guarding noted at rest   Cardiovascular: Normal rate, regular rhythm and normal heart sounds. No murmur heard. Pulmonary/Chest: Effort normal and breath sounds normal. No respiratory distress. She has no wheezes. She has no rales. She exhibits no tenderness. Abdominal: Bowel sounds are normal.   Musculoskeletal: She exhibits tenderness. She exhibits no edema. Decreased ROM in the C spine   Lymphadenopathy:     She has no cervical adenopathy. Neurological: She is alert and oriented to person, place, and time. She has normal reflexes. She displays normal reflexes. No cranial nerve deficit. She exhibits normal muscle tone. Coordination normal.   Skin: Skin is warm. No erythema. Psychiatric: She has a normal mood and affect. Her behavior is normal.   Nursing note and vitals reviewed. MDM     Differential Diagnosis; Clinical Impression; Plan:     CLINICAL IMPRESSION:  Cervical strain, initial encounter  (primary encounter diagnosis)    Plan:  1. robaxin  2. motrin  3.  Rest and FU    Risk of Significant Complications, Morbidity, and/or Mortality:   Presenting problems: Moderate  Management options:   Moderate  Progress:   Patient progress:  Stable      Procedures

## 2017-04-23 NOTE — DISCHARGE INSTRUCTIONS
Neck Strain: Care Instructions  Your Care Instructions  You have strained the muscles and ligaments in your neck. A sudden, awkward movement can strain the neck. This often occurs with falls or car accidents or during certain sports. Everyday activities like working on a computer or sleeping can also cause neck strain if they force you to hold your neck in an awkward position for a long time. It is common for neck pain to get worse for a day or two after an injury, but it should start to feel better after that. You may have more pain and stiffness for several days before it gets better. This is expected. It may take a few weeks or longer for it to heal completely. Good home treatment can help you get better faster and avoid future neck problems. Follow-up care is a key part of your treatment and safety. Be sure to make and go to all appointments, and call your doctor if you are having problems. It's also a good idea to know your test results and keep a list of the medicines you take. How can you care for yourself at home? · If you were given a neck brace (cervical collar) to limit neck motion, wear it as instructed for as many days as your doctor tells you to. Do not wear it longer than you were told to. Wearing a brace for too long can make neck stiffness worse and weaken the neck muscles. · You can try using heat or ice to see if it helps. ¨ Try using a heating pad on a low or medium setting for 15 to 20 minutes every 2 to 3 hours. Try a warm shower in place of one session with the heating pad. You can also buy single-use heat wraps that last up to 8 hours. ¨ You can also try an ice pack for 10 to 15 minutes every 2 to 3 hours. · Take pain medicines exactly as directed. ¨ If the doctor gave you a prescription medicine for pain, take it as prescribed. ¨ If you are not taking a prescription pain medicine, ask your doctor if you can take an over-the-counter medicine.   · Gently rub the area to relieve pain and help with blood flow. Do not massage the area if it hurts to do so. · Do not do anything that makes the pain worse. Take it easy for a couple of days. You can do your usual activities if they do not hurt your neck or put it at risk for more stress or injury. · Try sleeping on a special neck pillow. Place it under your neck, not under your head. Placing a tightly rolled-up towel under your neck while you sleep will also work. If you use a neck pillow or rolled towel, do not use your regular pillow at the same time. · To prevent future neck pain, do exercises to stretch and strengthen your neck and back. Learn how to use good posture, safe lifting techniques, and proper body mechanics. When should you call for help? Call 911 anytime you think you may need emergency care. For example, call if:  · You are unable to move an arm or a leg at all. Call your doctor now or seek immediate medical care if:  · You have new or worse symptoms in your arms, legs, chest, belly, or buttocks. Symptoms may include:  ¨ Numbness or tingling. ¨ Weakness. ¨ Pain. · You lose bladder or bowel control. Watch closely for changes in your health, and be sure to contact your doctor if:  · You are not getting better as expected. Where can you learn more? Go to http://yoly-chelsea.info/. Enter M253 in the search box to learn more about \"Neck Strain: Care Instructions. \"  Current as of: May 23, 2016  Content Version: 11.2  © 0156-6990 [employer], [employer]. Care instructions adapted under license by [employer] (which disclaims liability or warranty for this information). If you have questions about a medical condition or this instruction, always ask your healthcare professional. [address] any warranty or liability for your use of this information.          Neck Strain or Sprain: Rehab Exercises  Your Care Instructions  Here are some examples of typical rehabilitation exercises for your condition. Start each exercise slowly. Ease off the exercise if you start to have pain. Your doctor or physical therapist will tell you when you can start these exercises and which ones will work best for you. How to do the exercises  Neck rotation    1. Sit in a firm chair, or stand up straight. 2. Keeping your chin level, turn your head to the right, and hold for 15 to 30 seconds. 3. Turn your head to the left and hold for 15 to 30 seconds. 4. Repeat 2 to 4 times to each side. Neck stretches    1. Look straight ahead, and tip your right ear to your right shoulder. Do not let your left shoulder rise up as you tip your head to the right. 2. Hold for 15 to 30 seconds. 3. Tilt your head to the left. Do not let your right shoulder rise up as you tip your head to the left. 4. Hold for 15 to 30 seconds. 5. Repeat 2 to 4 times to each side. Forward neck flexion    1. Sit in a firm chair, or stand up straight. 2. Bend your head forward. 3. Hold for 15 to 30 seconds. 4. Repeat 2 to 4 times. Lateral (side) bend strengthening    1. With your right hand, place your first two fingers on your right temple. 2. Start to bend your head to the side while using gentle pressure from your fingers to keep your head from bending. 3. Hold for about 6 seconds. 4. Repeat 8 to 12 times. 5. Switch hands and repeat the same exercise on your left side. Forward bend strengthening    1. Place your first two fingers of either hand on your forehead. 2. Start to bend your head forward while using gentle pressure from your fingers to keep your head from bending. 3. Hold for about 6 seconds. 4. Repeat 8 to 12 times. Neutral position strengthening    1. Using one hand, place your fingertips on the back of your head at the top of your neck. 2. Start to bend your head backward while using gentle pressure from your fingers to keep your head from bending. 3. Hold for about 6 seconds.   4. Repeat 8 to 12 times.  Chin tuck    1. Lie on the floor with a rolled-up towel under your neck. Your head should be touching the floor. 2. Slowly bring your chin toward your chest.  3. Hold for a count of 6, and then relax for up to 10 seconds. 4. Repeat 8 to 12 times. Follow-up care is a key part of your treatment and safety. Be sure to make and go to all appointments, and call your doctor if you are having problems. It's also a good idea to know your test results and keep a list of the medicines you take. Where can you learn more? Go to http://yoly-chelsea.info/. Enter M679 in the search box to learn more about \"Neck Strain or Sprain: Rehab Exercises. \"  Current as of: May 23, 2016  Content Version: 11.2  © 6604-8313 [employer]. Care instructions adapted under license by [employer] (which disclaims liability or warranty for this information). If you have questions about a medical condition or this instruction, always ask your healthcare professional. [address] any warranty or liability for your use of this information.

## 2017-05-09 DIAGNOSIS — G43.009 MIGRAINE WITHOUT AURA AND WITHOUT STATUS MIGRAINOSUS, NOT INTRACTABLE: ICD-10-CM

## 2017-05-09 DIAGNOSIS — F90.9 ATTENTION DEFICIT HYPERACTIVITY DISORDER (ADHD), UNSPECIFIED ADHD TYPE: ICD-10-CM

## 2017-05-09 RX ORDER — PROPRANOLOL HYDROCHLORIDE 10 MG/1
10 TABLET ORAL 2 TIMES DAILY
Qty: 180 TAB | Refills: 3 | Status: SHIPPED | OUTPATIENT
Start: 2017-05-09 | End: 2017-07-18 | Stop reason: DRUGHIGH

## 2017-05-09 NOTE — TELEPHONE ENCOUNTER
Requested Prescriptions     Pending Prescriptions Disp Refills    propranolol (INDERAL) 10 mg tablet 180 Tab 3     Sig: Take 1 Tab by mouth two (2) times a day. Pharmacy would like a 90 day supply.

## 2017-05-17 ENCOUNTER — OFFICE VISIT (OUTPATIENT)
Dept: NEUROLOGY | Age: 42
End: 2017-05-17

## 2017-05-17 VITALS
HEART RATE: 77 BPM | DIASTOLIC BLOOD PRESSURE: 62 MMHG | OXYGEN SATURATION: 98 % | WEIGHT: 169 LBS | BODY MASS INDEX: 28.12 KG/M2 | SYSTOLIC BLOOD PRESSURE: 110 MMHG

## 2017-05-17 DIAGNOSIS — F41.9 ANXIETY: ICD-10-CM

## 2017-05-17 DIAGNOSIS — G43.711 INTRACTABLE CHRONIC MIGRAINE WITHOUT AURA AND WITH STATUS MIGRAINOSUS: Primary | ICD-10-CM

## 2017-05-17 DIAGNOSIS — F98.8 ATTENTION DEFICIT DISORDER (ADD): ICD-10-CM

## 2017-05-17 RX ORDER — FROVATRIPTAN SUCCINATE 2.5 MG/1
2.5 TABLET, FILM COATED ORAL
Qty: 9 TAB | Refills: 3 | Status: SHIPPED | OUTPATIENT
Start: 2017-05-17 | End: 2017-08-27 | Stop reason: SDUPTHER

## 2017-05-17 RX ORDER — PROPRANOLOL HYDROCHLORIDE 10 MG/1
10 TABLET ORAL 3 TIMES DAILY
Qty: 90 TAB | Refills: 3 | Status: SHIPPED | OUTPATIENT
Start: 2017-05-17 | End: 2017-07-17 | Stop reason: SDUPTHER

## 2017-05-17 RX ORDER — METHYLPHENIDATE HYDROCHLORIDE 5 MG/1
5 TABLET ORAL 2 TIMES DAILY
Qty: 60 TAB | Refills: 0 | Status: SHIPPED | OUTPATIENT
Start: 2017-05-17 | End: 2017-08-17 | Stop reason: SDUPTHER

## 2017-05-17 RX ORDER — METHYLPHENIDATE HYDROCHLORIDE 5 MG/1
5 TABLET ORAL 2 TIMES DAILY
Qty: 60 TAB | Refills: 0 | Status: SHIPPED | OUTPATIENT
Start: 2017-06-16 | End: 2017-08-17 | Stop reason: SDUPTHER

## 2017-05-17 RX ORDER — METHYLPHENIDATE HYDROCHLORIDE 5 MG/1
5 TABLET ORAL 2 TIMES DAILY
Qty: 60 TAB | Refills: 0 | Status: SHIPPED | OUTPATIENT
Start: 2017-07-16 | End: 2017-08-17 | Stop reason: SDUPTHER

## 2017-05-17 RX ORDER — VENLAFAXINE HYDROCHLORIDE 150 MG/1
150 CAPSULE, EXTENDED RELEASE ORAL
Qty: 90 CAP | Refills: 3 | Status: SHIPPED | OUTPATIENT
Start: 2017-05-17 | End: 2017-08-17 | Stop reason: SDUPTHER

## 2017-05-17 NOTE — PROGRESS NOTES
Chief Complaint: migraines    Returns for follow up. She is on propranolol. She has up to three migraines a month. They don't last long and the frova has been effective in treating the migraines within an hour. She would like to continue on tid dosing of propranolol    Assesment and Plan  1. Intractable chronic migraine without aura and with status migrainosus  Continue propranolol    2. Anxiety    - venlafaxine-SR (EFFEXOR-XR) 150 mg capsule; Take 1 Cap by mouth daily (with breakfast). Dispense: 90 Cap; Refill: 3    3. Attention deficit hyperactivity disorder (ADHD), unspecified ADHD type  - methylphenidate (RITALIN) 5 mg tablet; Take 1 Tab (5 mg total) by mouth two (2) times a day. Max Daily Amount: 10 mg  Dispense: 60 Tab; Refill: 0      Allergies  Paxil [paroxetine hcl] and Erythromycin     Medications  Current Outpatient Prescriptions   Medication Sig    propranolol (INDERAL) 10 mg tablet Take 1 Tab by mouth two (2) times a day.  baclofen (LIORESAL) 10 mg tablet Take  by mouth four (4) times daily.  ibuprofen (MOTRIN) 600 mg tablet Take 1 Tab by mouth every six (6) hours as needed for Pain.  methylphenidate (RITALIN) 5 mg tablet Take 1 Tab (5 mg total) by mouth two (2) times a dayEarliest Fill Date: 4/18/17. Max Daily Amount: 10 mg    venlafaxine-SR (EFFEXOR-XR) 150 mg capsule Take 1 Cap by mouth daily (with breakfast).  frovatriptan (FROVA) 2.5 mg tablet Take 1 Tab by mouth once as needed for Migraine. Can take a second dose two hours later, max 2 daily    cetirizine (ZYRTEC) 10 mg tablet Take  by mouth daily.  ferrous gluconate (FERATE) 240 mg (27 mg iron) tablet Take 240 mg by mouth daily.  traMADol (ULTRAM-ER) 300 mg tablet Take 300 mg by mouth daily.  conjugated estrogens (PREMARIN) 1.25 mg tablet Take 1.25 mg by mouth daily.  gabapentin (NEURONTIN) 600 mg tablet Take 1 Tab by mouth three (3) times daily.  (Patient taking differently: Take 600 mg by mouth five (5) times daily.)  butalbital-acetaminophen-caffeine (FIORICET, ESGIC) -40 mg per tablet Take 1 Tab by mouth every six (6) hours as needed for Pain. Max Daily Amount: 2 Tabs. No current facility-administered medications for this visit. Medical History  Past Medical History:   Diagnosis Date    Anxiety     Arthritis     back    Asthma     has not required tx in past 10 years    Chronic pain     lumbar    Dyspepsia and other specified disorders of function of stomach     Hypertension     Migraine     Nausea & vomiting      Review of Systems   Constitutional: Negative for chills and fever. HENT: Negative for ear pain. Eyes: Negative for pain and discharge. Respiratory: Negative for cough and hemoptysis. Cardiovascular: Negative for chest pain and claudication. Gastrointestinal: Negative for constipation and diarrhea. Genitourinary: Negative for flank pain and hematuria. Musculoskeletal: Negative for back pain and myalgias. Skin: Negative for itching and rash. Neurological: Negative for tingling and tremors. Migraine headaches   Endo/Heme/Allergies: Negative for environmental allergies. Does not bruise/bleed easily. Psychiatric/Behavioral: Negative for depression and hallucinations. Exam:    Visit Vitals    /62    Pulse 77    Wt 169 lb (76.7 kg)    SpO2 98%    BMI 28.12 kg/m2      Gen Appearance: Well built no apparent distress  HEENT : Normocephalic atraumatic with anicteric sclera  Neck: Supple with no thyromegaly   Chest: Clear to auscultation, no wheezes or rubs    CardioVascular:   Regular in rhythm and rate with no murmurs  Carotids no bruit  No pedal edema    Abdomen: Soft non tender, distended with normal bowel sounds  Ext: Full range of motion  Skin: Supple, No rash    Neuro:  Awake alert and oriented to person and place and time  Recent and remote memory is intact  Speech: No aphasia and no dysarthria, intact repetition.      Cranial Nerves: II-XII intact  Eyes: Full bilateral visual fields by confrontation. PERRLA EOMI   Motor: 5/5 in all major muscle groups   No tremors  Normal tone, no cogwheel rigidity  Reflexes: 2+ over biceps, triceps and brachioradialis tendons    2+ over the patellar and achilles tendons  Sensory: Intact to all modalities in both proximal and distal distributions  Coordination: Finger to nose and heel over shin to knee intact on both sides  Gait: Well balanced with normal arm swing        Imaging    CT Results (most recent):    Results from Hospital Encounter encounter on 03/15/16   CT ABD PELV W CONT   Narrative **Final Report**      ICD Codes / Adm. Diagnosis: 449435  285.9 / Mental Health Problem  Other  Examination:  CT ABD PELVIS W CON  - [id number] - Mar 15 2016  5:52PM  Accession No:  [id number]  Reason:  Pain      REPORT:  EXAM:  CT ABDOMEN PELVIS WITH CONTRAST    INDICATION:  Fatigue. Right lower quadrant pain. COMPARISON: 2/24/2016. CONTRAST:  100 mL of Isovue-370. TECHNIQUE:   Multislice helical CT was performed from the diaphragm to the symphysis   pubis during uneventful rapid bolus intravenous contrast administration. Oral contrast was not administered. Contiguous 5 mm axial images were   reconstructed and lung and soft tissue windows were generated. Coronal and   sagittal reformations were generated. FINDINGS:  Atelectasis is seen in the lung bases. .    The liver, spleen, right adrenal gland, pancreas, and gallbladder are   unremarkable. There is a 9 mm nodule in the left adrenal gland. This is   unchanged since 2012 and most likely represents an incidental adrenal   adenoma. The bilateral kidneys are unremarkable without evidence of   hydronephrosis. The abdominal aorta is normal in size. There is no retroperitoneal or   mesenteric lymphadenopathy. The stomach and small bowel are unremarkable. The patient is status post   resection of the cecum and appendix.  No obstruction, free fluid, or free air.    The patient is status post hysterectomy and ovarian resection. The bladder   is unremarkable. No evidence of a destructive osseous lesion. There is moderate spondylosis   at L4-5. IMPRESSION:   No evidence of acute process. Incidentals as above. Signing/Reading Doctor: Dennis Marina ([id number])    Approved: Dennis Marina ([id number])  Mar 15 2016  6:21PM                                      MRI Results (most recent):    Results from Hospital Encounter encounter on 04/29/11   MRI SPINE LUMBAR WITHOUT CONTRAST   Narrative **Final Report**      ICD Codes / Adm. Diagnosis: 724.2   / LBP (LOW BACK PAIN)    Examination:  MR L SPINE WO CON  - [id number] - Apr 29 2011  6:55PM  Accession No:  [id number]  Reason:  lower back pain      REPORT:  CLINICAL HISTORY: Pain    INDICATION: Lower back pain    COMPARISON: None    TECHNIQUE: MR imaging of the lumbar spine was performed with sagittal T1,   T2, STIR;  axial T1, T2. Contrast was not administered. FINDINGS:    There is normal alignment of the lumbar spine. Vertebral body heights are   maintained. Marrow signal is normal.  The conus medullaris terminates at L1.     L1/2:  The spinal canal and neuroforamina are widely patent. L2/3:  Facet arthropathy and hypertrophy. Ligamentum flavum hypertrophy. The   spinal canal and neuroforamina are widely patent. L3/4:  Facet arthropathy and hypertrophy. Ligamentum flavum hypertrophy. spinal canal and neuroforamina are widely patent> . L4/5:  Moderate central disc protrusion. Mild facet arthropathy. Mild   central canal stenosis. This desiccation and loss of disc height. Facet   arthropathy and hypertrophy. Ligamentum flavum hypertrophy. The   neuroforamina are widely patent. L5/S1:  Mild central disc protrusion. The spinal canal and neuroforamina   are widely patent.     The visualized musculature and intraperitoneal structures are within normal   limits       IMPRESSION:  Multilevel degenerative changes most severe at L4-L5 where there is moderate   central disc protrusion and mild central canal stenosis.           Interpreting/Reading Doctor: Radha Elias ([id number])  Transcribed:  on 05/01/2011  Approved: Radha Elias ([id number])  05/01/2011          Distribution:  Attending Doctor: Omid Yee              Lab Review    Lab Results   Component Value Date/Time    WBC 4.6 03/15/2016 03:03 PM    HCT 40.3 03/15/2016 03:03 PM    HGB 13.0 03/15/2016 03:03 PM    PLATELET 546 45/49/3796 03:03 PM       Lab Results   Component Value Date/Time    Sodium 142 03/15/2016 03:03 PM    Potassium 3.5 03/15/2016 03:03 PM    Chloride 105 03/15/2016 03:03 PM    CO2 28 03/15/2016 03:03 PM    Glucose 121 03/15/2016 03:03 PM    BUN 6 03/15/2016 03:03 PM    Creatinine 0.70 03/15/2016 03:03 PM    Calcium 8.8 03/15/2016 03:03 PM

## 2017-05-17 NOTE — PATIENT INSTRUCTIONS
A Healthy Lifestyle: Care Instructions  Your Care Instructions  A healthy lifestyle can help you feel good, stay at a healthy weight, and have plenty of energy for both work and play. A healthy lifestyle is something you can share with your whole family. A healthy lifestyle also can lower your risk for serious health problems, such as high blood pressure, heart disease, and diabetes. You can follow a few steps listed below to improve your health and the health of your family. Follow-up care is a key part of your treatment and safety. Be sure to make and go to all appointments, and call your doctor if you are having problems. Its also a good idea to know your test results and keep a list of the medicines you take. How can you care for yourself at home? · Do not eat too much sugar, fat, or fast foods. You can still have dessert and treats now and then. The goal is moderation. · Start small to improve your eating habits. Pay attention to portion sizes, drink less juice and soda pop, and eat more fruits and vegetables. ¨ Eat a healthy amount of food. A 3-ounce serving of meat, for example, is about the size of a deck of cards. Fill the rest of your plate with vegetables and whole grains. ¨ Limit the amount of soda and sports drinks you have every day. Drink more water when you are thirsty. ¨ Eat at least 5 servings of fruits and vegetables every day. It may seem like a lot, but it is not hard to reach this goal. A serving or helping is 1 piece of fruit, 1 cup of vegetables, or 2 cups of leafy, raw vegetables. Have an apple or some carrot sticks as an afternoon snack instead of a candy bar. Try to have fruits and/or vegetables at every meal.  · Make exercise part of your daily routine. You may want to start with simple activities, such as walking, bicycling, or slow swimming. Try to be active 30 to 60 minutes every day. You do not need to do all 30 to 60 minutes all at once.  For example, you can exercise 3 times a day for 10 or 20 minutes. Moderate exercise is safe for most people, but it is always a good idea to talk to your doctor before starting an exercise program.  · Keep moving. Isma Dockeryrs the lawn, work in the garden, or [employer]. Take the stairs instead of the elevator at work. · If you smoke, quit. People who smoke have an increased risk for heart attack, stroke, cancer, and other lung illnesses. Quitting is hard, but there are ways to boost your chance of quitting tobacco for good. ¨ Use nicotine gum, patches, or lozenges. ¨ Ask your doctor about stop-smoking programs and medicines. ¨ Keep trying. In addition to reducing your risk of diseases in the future, you will notice some benefits soon after you stop using tobacco. If you have shortness of breath or asthma symptoms, they will likely get better within a few weeks after you quit. · Limit how much alcohol you drink. Moderate amounts of alcohol (up to 2 drinks a day for men, 1 drink a day for women) are okay. But drinking too much can lead to liver problems, high blood pressure, and other health problems. Family health  If you have a family, there are many things you can do together to improve your health. · Eat meals together as a family as often as possible. · Eat healthy foods. This includes fruits, vegetables, lean meats and dairy, and whole grains. · Include your family in your fitness plan. Most people think of activities such as jogging or tennis as the way to fitness, but there are many ways you and your family can be more active. Anything that makes you breathe hard and gets your heart pumping is exercise. Here are some tips:  ¨ Walk to do errands or to take your child to school or the bus. ¨ Go for a family bike ride after dinner instead of watching TV. Where can you learn more? Go to http://yoly-chelsea.info/. Enter S263 in the search box to learn more about \"A Healthy Lifestyle: Care Instructions. \"  Current as of: July 26, 2016  Content Version: 11.2  © 7990-9938 [employer]. Care instructions adapted under license by [employer] (which disclaims liability or warranty for this information). If you have questions about a medical condition or this instruction, always ask your healthcare professional. [address] any warranty or liability for your use of this information. [facility]   Statement to Patients  April 1, 2014      In an effort to ensure the large volume of patient prescription refills is processed in the most efficient and expeditious manner, we are asking our patients to assist us by calling your Pharmacy for all prescription refills, this will include also your  Mail Order Pharmacy. The pharmacy will contact our office electronically to continue the refill process. Please do not wait until the last minute to call your pharmacy. We need at least 48 hours (2days) to fill prescriptions. We also encourage you to call your pharmacy before going to  your prescription to make sure it is ready. With regard to controlled substance prescription refill requests (narcotic refills) that need to be picked up at our office, we ask your cooperation by providing us with at least 72 hours (3days) notice that you will need a refill. We will not refill narcotic prescription refill requests after 4:00pm on any weekday, Monday through Thursday, or after 2:00pm on Fridays, or on the weekends. We encourage everyone to explore another way of getting your prescription refill request processed using [employer], our patient web portal through our electronic medical record system. [employer] is an efficient and effective way to communicate your medication request directly to the office and  downloadable as an feng on your smart phone .  [employer] also features a review functionality that allows you to view your medication list as well as leave messages for your physician. Are you ready to get connected? If so please review the attatched instructions or speak to any of our staff to get you set up right away! Thank you so much for your cooperation. Should you have any questions please contact our Practice Administrator.     The Physicians and Staff,  [facility]

## 2017-05-17 NOTE — MR AVS SNAPSHOT
Visit Information Date & Time Provider Department Dept. Phone Encounter #  
 5/17/2017  8:20 AM Raquel Dacosta MD [facility] [phone] [id number] Follow-up Instructions Return in about 3 months (around 8/17/2017). Upcoming Health Maintenance Date Due DTaP/Tdap/Td series (1 - Tdap) 9/2/1998 PAP AKA CERVICAL CYTOLOGY 9/2/1998 INFLUENZA AGE 9 TO ADULT 8/1/2017 Allergies as of 5/17/2017  Review Complete On: 5/17/2017 By: Raquel Dacosta MD  
  
 Severity Noted Reaction Type Reactions Paxil [Paroxetine Hcl] High 05/17/2017    Anaphylaxis Erythromycin  12/17/2010    Nausea and Vomiting Current Immunizations  Reviewed on 12/19/2012 No immunizations on file. Not reviewed this visit You Were Diagnosed With   
  
 Codes Comments Intractable chronic migraine without aura and with status migrainosus    -  Primary ICD-10-CM: Q79.630 ICD-9-CM: 346.73 Anxiety     ICD-10-CM: F41.9 ICD-9-CM: 300.00 Attention deficit disorder (ADD)     ICD-10-CM: Q36.2 ICD-9-CM: 314.00 Attention deficit hyperactivity disorder (ADHD), unspecified ADHD type     ICD-10-CM: F90.9 ICD-9-CM: 314.01 Migraine without aura and without status migrainosus, not intractable     ICD-10-CM: B26.301 ICD-9-CM: 346.10 Vitals BP Pulse Weight(growth percentile) SpO2 BMI OB Status 110/62 77 169 lb (76.7 kg) 98% 28.12 kg/m2 Hysterectomy Smoking Status Former Smoker BMI and BSA Data Body Mass Index Body Surface Area  
 28.12 kg/m 2 1.88 m 2 Preferred Pharmacy Pharmacy Name Phone [facility] [address] [phone] Your Updated Medication List  
  
   
This list is accurate as of: 5/17/17  8:45 AM.  Always use your most recent med list.  
  
  
  
  
 baclofen 10 mg tablet Commonly known as:  LIORESAL  
 Take  by mouth four (4) times daily. butalbital-acetaminophen-caffeine -40 mg per tablet Commonly known as:  Estel Maria C Take 1 Tab by mouth every six (6) hours as needed for Pain. Max Daily Amount: 2 Tabs. cetirizine 10 mg tablet Commonly known as:  ZYRTEC Take  by mouth daily. FERATE 240 mg (27 mg iron) tablet Generic drug:  ferrous gluconate Take 240 mg by mouth daily. frovatriptan 2.5 mg tablet Commonly known as:  Fanny Fairbanks Take 1 Tab by mouth once as needed for Migraine. Can take a second dose two hours later, max 2 daily  
  
 gabapentin 600 mg tablet Commonly known as:  NEURONTIN Take 1 Tab by mouth three (3) times daily. ibuprofen 600 mg tablet Commonly known as:  MOTRIN Take 1 Tab by mouth every six (6) hours as needed for Pain. * methylphenidate 5 mg tablet Commonly known as:  RITALIN Take 1 Tab (5 mg total) by mouth two (2) times a day. Max Daily Amount: 10 mg  
  
 * methylphenidate 5 mg tablet Commonly known as:  RITALIN Take 1 Tab (5 mg total) by mouth two (2) times a dayEarliest Fill Date: 6/16/17. Max Daily Amount: 10 mg  
Start taking on:  6/16/2017 * methylphenidate 5 mg tablet Commonly known as:  RITALIN Take 1 Tab by mouth two (2) times a day. Max Daily Amount: 10 mg. Start taking on:  7/16/2017 PREMARIN 1.25 mg tablet Generic drug:  conjugated estrogens Take 1.25 mg by mouth daily. * propranolol 10 mg tablet Commonly known as:  INDERAL Take 1 Tab by mouth two (2) times a day. * propranolol 10 mg tablet Commonly known as:  INDERAL Take 1 Tab by mouth three (3) times daily. traMADol 300 mg tablet Commonly known as:  ULTRAM-ER Take 300 mg by mouth daily. venlafaxine- mg capsule Commonly known as:  EFFEXOR-XR Take 1 Cap by mouth daily (with breakfast). * Notice:   This list has 5 medication(s) that are the same as other medications prescribed for you. Read the directions carefully, and ask your doctor or other care provider to review them with you. Prescriptions Printed Refills  
 methylphenidate (RITALIN) 5 mg tablet 0 Sig: Take 1 Tab (5 mg total) by mouth two (2) times a day. Max Daily Amount: 10 mg  
 Class: Print Route: Oral  
 methylphenidate (RITALIN) 5 mg tablet 0 Starting on: 6/16/2017 Sig: Take 1 Tab (5 mg total) by mouth two (2) times a dayEarliest Fill Date: 6/16/17. Max Daily Amount: 10 mg  
 Class: Print Route: Oral  
 methylphenidate (RITALIN) 5 mg tablet 0 Starting on: 7/16/2017 Sig: Take 1 Tab by mouth two (2) times a day. Max Daily Amount: 10 mg.  
 Class: Print Route: Oral  
  
Prescriptions Sent to Pharmacy Refills  
 propranolol (INDERAL) 10 mg tablet 3 Sig: Take 1 Tab by mouth three (3) times daily. Class: Normal  
 Pharmacy: [address] Ph #: [phone] Route: Oral  
 venlafaxine-SR (EFFEXOR-XR) 150 mg capsule 3 Sig: Take 1 Cap by mouth daily (with breakfast). Class: Normal  
 Pharmacy: [address] Ph #: [phone] Route: Oral  
  
Follow-up Instructions Return in about 3 months (around 8/17/2017). Patient Instructions A Healthy Lifestyle: Care Instructions Your Care Instructions A healthy lifestyle can help you feel good, stay at a healthy weight, and have plenty of energy for both work and play. A healthy lifestyle is something you can share with your whole family. A healthy lifestyle also can lower your risk for serious health problems, such as high blood pressure, heart disease, and diabetes. You can follow a few steps listed below to improve your health and the health of your family. Follow-up care is a key part of your treatment and safety.  Be sure to make and go to all appointments, and call your doctor if you are having problems. Its also a good idea to know your test results and keep a list of the medicines you take. How can you care for yourself at home? · Do not eat too much sugar, fat, or fast foods. You can still have dessert and treats now and then. The goal is moderation. · Start small to improve your eating habits. Pay attention to portion sizes, drink less juice and soda pop, and eat more fruits and vegetables. ¨ Eat a healthy amount of food. A 3-ounce serving of meat, for example, is about the size of a deck of cards. Fill the rest of your plate with vegetables and whole grains. ¨ Limit the amount of soda and sports drinks you have every day. Drink more water when you are thirsty. ¨ Eat at least 5 servings of fruits and vegetables every day. It may seem like a lot, but it is not hard to reach this goal. A serving or helping is 1 piece of fruit, 1 cup of vegetables, or 2 cups of leafy, raw vegetables. Have an apple or some carrot sticks as an afternoon snack instead of a candy bar. Try to have fruits and/or vegetables at every meal. 
· Make exercise part of your daily routine. You may want to start with simple activities, such as walking, bicycling, or slow swimming. Try to be active 30 to 60 minutes every day. You do not need to do all 30 to 60 minutes all at once. For example, you can exercise 3 times a day for 10 or 20 minutes. Moderate exercise is safe for most people, but it is always a good idea to talk to your doctor before starting an exercise program. 
· Keep moving. Tegan Lacy the lawn, work in the garden, or [place]. Take the stairs instead of the elevator at work. · If you smoke, quit. People who smoke have an increased risk for heart attack, stroke, cancer, and other lung illnesses. Quitting is hard, but there are ways to boost your chance of quitting tobacco for good. ¨ Use nicotine gum, patches, or lozenges. ¨ Ask your doctor about stop-smoking programs and medicines. ¨ Keep trying. In addition to reducing your risk of diseases in the future, you will notice some benefits soon after you stop using tobacco. If you have shortness of breath or asthma symptoms, they will likely get better within a few weeks after you quit. · Limit how much alcohol you drink. Moderate amounts of alcohol (up to 2 drinks a day for men, 1 drink a day for women) are okay. But drinking too much can lead to liver problems, high blood pressure, and other health problems. Family health If you have a family, there are many things you can do together to improve your health. · Eat meals together as a family as often as possible. · Eat healthy foods. This includes fruits, vegetables, lean meats and dairy, and whole grains. · Include your family in your fitness plan. Most people think of activities such as jogging or tennis as the way to fitness, but there are many ways you and your family can be more active. Anything that makes you breathe hard and gets your heart pumping is exercise. Here are some tips: 
¨ Walk to do errands or to take your child to school or the bus. ¨ Go for a family bike ride after dinner instead of watching TV. Where can you learn more? Go to http://yoly-chelsea.info/. Enter H802 in the search box to learn more about \"A Healthy Lifestyle: Care Instructions. \" Current as of: July 26, 2016 Content Version: 11.2 © 5381-7009 [employer]. Care instructions adapted under license by [employer] (which disclaims liability or warranty for this information). If you have questions about a medical condition or this instruction, always ask your healthcare professional. [place] [address] any warranty or liability for your use of this information. PRESCRIPTION REFILL POLICY [facility] Statement to Patients April 1, 2014 In an effort to ensure the large volume of patient prescription refills is processed in the most efficient and expeditious manner, we are asking our patients to assist us by calling your Pharmacy for all prescription refills, this will include also your  Mail Order Pharmacy. The pharmacy will contact our office electronically to continue the refill process. Please do not wait until the last minute to call your pharmacy. We need at least 48 hours (2days) to fill prescriptions. We also encourage you to call your pharmacy before going to  your prescription to make sure it is ready. With regard to controlled substance prescription refill requests (narcotic refills) that need to be picked up at our office, we ask your cooperation by providing us with at least 72 hours (3days) notice that you will need a refill. We will not refill narcotic prescription refill requests after 4:00pm on any weekday, Monday through Thursday, or after 2:00pm on Fridays, or on the weekends. We encourage everyone to explore another way of getting your prescription refill request processed using [employer], our patient web portal through our electronic medical record system. [employer] is an efficient and effective way to communicate your medication request directly to the office and  downloadable as an feng on your smart phone . [employer] also features a review functionality that allows you to view your medication list as well as leave messages for your physician. Are you ready to get connected? If so please review the attatched instructions or speak to any of our staff to get you set up right away! Thank you so much for your cooperation. Should you have any questions please contact our Practice Administrator. The Physicians and Staff,  [facility] Introducing \[facility]\"" SERVICES! Dear Dar Arambula: Thank you for requesting a [employer] account.   Our records indicate that you already have an active [employer] account. You can access your account anytime at [email or web address] Did you know that you can access your hospital and ER discharge instructions at any time in [employer]? You can also review all of your test results from your hospital stay or ER visit. Additional Information If you have questions, please visit the Frequently Asked Questions section of the [employer] website at [email or web address] Remember, [employer] is NOT to be used for urgent needs. For medical emergencies, dial 911. Now available from your iPhone and Android! Please provide this summary of care documentation to your next provider. Your primary care clinician is listed as TYREE Steele. If you have any questions after today's visit, please call [phone].

## 2017-07-17 DIAGNOSIS — G43.711 INTRACTABLE CHRONIC MIGRAINE WITHOUT AURA AND WITH STATUS MIGRAINOSUS: ICD-10-CM

## 2017-07-17 NOTE — TELEPHONE ENCOUNTER
Received refill request for 90 days for this medication    Please refill  Requested Prescriptions     Pending Prescriptions Disp Refills    propranolol (INDERAL) 10 mg tablet 270 Tab 3     Sig: Take 1 Tab by mouth three (3) times daily.      Future Appointments  Date Time Provider Paula Brink   8/17/2017 8:40 AM Valeri Keane MD [address]                         Last Appointment My Department:  5/17/2017

## 2017-07-18 RX ORDER — PROPRANOLOL HYDROCHLORIDE 10 MG/1
10 TABLET ORAL 3 TIMES DAILY
Qty: 270 TAB | Refills: 0 | Status: SHIPPED | OUTPATIENT
Start: 2017-07-18 | End: 2017-08-17 | Stop reason: SDUPTHER

## 2017-08-17 ENCOUNTER — OFFICE VISIT (OUTPATIENT)
Dept: NEUROLOGY | Age: 42
End: 2017-08-17

## 2017-08-17 VITALS
HEART RATE: 88 BPM | OXYGEN SATURATION: 98 % | DIASTOLIC BLOOD PRESSURE: 72 MMHG | WEIGHT: 172 LBS | HEIGHT: 65 IN | SYSTOLIC BLOOD PRESSURE: 110 MMHG | BODY MASS INDEX: 28.66 KG/M2

## 2017-08-17 DIAGNOSIS — F98.8 ATTENTION DEFICIT DISORDER (ADD): ICD-10-CM

## 2017-08-17 DIAGNOSIS — F41.9 ANXIETY: ICD-10-CM

## 2017-08-17 DIAGNOSIS — R41.840 ATTENTION DEFICIT: ICD-10-CM

## 2017-08-17 DIAGNOSIS — G43.711 INTRACTABLE CHRONIC MIGRAINE WITHOUT AURA AND WITH STATUS MIGRAINOSUS: Primary | ICD-10-CM

## 2017-08-17 RX ORDER — METHYLPHENIDATE HYDROCHLORIDE 5 MG/1
5 TABLET ORAL 2 TIMES DAILY
Qty: 60 TAB | Refills: 0 | Status: SHIPPED | OUTPATIENT
Start: 2017-10-17 | End: 2018-11-27 | Stop reason: SDUPTHER

## 2017-08-17 RX ORDER — PROPRANOLOL HYDROCHLORIDE 10 MG/1
10 TABLET ORAL 3 TIMES DAILY
Qty: 270 TAB | Refills: 0 | Status: SHIPPED | OUTPATIENT
Start: 2017-08-17 | End: 2018-02-06 | Stop reason: SDUPTHER

## 2017-08-17 RX ORDER — METHYLPHENIDATE HYDROCHLORIDE 5 MG/1
5 TABLET ORAL 2 TIMES DAILY
Qty: 60 TAB | Refills: 0 | Status: SHIPPED | OUTPATIENT
Start: 2017-09-17 | End: 2019-02-21 | Stop reason: SDUPTHER

## 2017-08-17 RX ORDER — METHYLPHENIDATE HYDROCHLORIDE 5 MG/1
5 TABLET ORAL 2 TIMES DAILY
Qty: 60 TAB | Refills: 0 | Status: SHIPPED | OUTPATIENT
Start: 2017-08-17 | End: 2018-11-27 | Stop reason: SDUPTHER

## 2017-08-17 RX ORDER — VENLAFAXINE HYDROCHLORIDE 150 MG/1
150 CAPSULE, EXTENDED RELEASE ORAL
Qty: 90 CAP | Refills: 3 | Status: SHIPPED | OUTPATIENT
Start: 2017-08-17 | End: 2018-08-14 | Stop reason: SDUPTHER

## 2017-08-17 NOTE — PROGRESS NOTES
Chief Complaint: migraines    Returns for follow up. Is compliant with her medications and content with the migraine control. She is has no new medical problems and has been prescribed no new medications. Assesment and Plan  1. Intractable chronic migraine without aura and with status migrainosus  Continue propranolol    2. Anxiety  - venlafaxine-SR (EFFEXOR-XR) 150 mg capsule; Take 1 Cap by mouth daily (with breakfast). Dispense: 90 Cap; Refill: 3    3. Attention deficit hyperactivity disorder (ADHD), unspecified ADHD type  - methylphenidate (RITALIN) 5 mg tablet; Take 1 Tab (5 mg total) by mouth two (2) times a day. Max Daily Amount: 10 mg  Dispense: 60 Tab; Refill: 0      Allergies  Paxil [paroxetine hcl] and Erythromycin     Medications  Current Outpatient Prescriptions   Medication Sig    propranolol (INDERAL) 10 mg tablet Take 1 Tab by mouth two (2) times a day.  baclofen (LIORESAL) 10 mg tablet Take  by mouth four (4) times daily.  ibuprofen (MOTRIN) 600 mg tablet Take 1 Tab by mouth every six (6) hours as needed for Pain.  methylphenidate (RITALIN) 5 mg tablet Take 1 Tab (5 mg total) by mouth two (2) times a dayEarliest Fill Date: 4/18/17. Max Daily Amount: 10 mg    venlafaxine-SR (EFFEXOR-XR) 150 mg capsule Take 1 Cap by mouth daily (with breakfast).  frovatriptan (FROVA) 2.5 mg tablet Take 1 Tab by mouth once as needed for Migraine. Can take a second dose two hours later, max 2 daily    cetirizine (ZYRTEC) 10 mg tablet Take  by mouth daily.  ferrous gluconate (FERATE) 240 mg (27 mg iron) tablet Take 240 mg by mouth daily.  traMADol (ULTRAM-ER) 300 mg tablet Take 300 mg by mouth daily.  conjugated estrogens (PREMARIN) 1.25 mg tablet Take 1.25 mg by mouth daily.  gabapentin (NEURONTIN) 600 mg tablet Take 1 Tab by mouth three (3) times daily.  (Patient taking differently: Take 600 mg by mouth five (5) times daily.)    butalbital-acetaminophen-caffeine (FIORICET, ESGIC) -40 mg per tablet Take 1 Tab by mouth every six (6) hours as needed for Pain. Max Daily Amount: 2 Tabs. No current facility-administered medications for this visit. Medical History  Past Medical History:   Diagnosis Date    Anxiety     Arthritis     back    Asthma     has not required tx in past 10 years    Chronic pain     lumbar    Dyspepsia and other specified disorders of function of stomach     Hypertension     Migraine     Nausea & vomiting      Review of Systems   Constitutional: Negative for chills and fever. HENT: Negative for ear pain. Eyes: Negative for pain and discharge. Respiratory: Negative for cough and hemoptysis. Cardiovascular: Negative for chest pain and claudication. Gastrointestinal: Negative for constipation and diarrhea. Genitourinary: Negative for flank pain and hematuria. Musculoskeletal: Negative for back pain and myalgias. Skin: Negative for itching and rash. Neurological: Negative for tingling and tremors. Migraine headaches   Endo/Heme/Allergies: Negative for environmental allergies. Does not bruise/bleed easily. Psychiatric/Behavioral: Negative for depression and hallucinations. Exam:    Visit Vitals    /62    Pulse 77    Wt 169 lb (76.7 kg)    SpO2 98%    BMI 28.12 kg/m2      Gen Appearance: Well built no apparent distress  HEENT : Normocephalic atraumatic with anicteric sclera  Neck: Supple with no thyromegaly   Chest: Clear to auscultation, no wheezes or rubs    CardioVascular:   Regular in rhythm and rate with no murmurs  Carotids no bruit  No pedal edema    Abdomen: Soft non tender, distended with normal bowel sounds  Ext: Full range of motion  Skin: Supple, No rash    Neuro:  Awake alert and oriented to person and place and time  Recent and remote memory is intact  Speech: No aphasia and no dysarthria, intact repetition. Cranial Nerves: II-XII intact  Eyes: Full bilateral visual fields by confrontation. PERRLA EOMI   Motor: 5/5 in all major muscle groups   No tremors  Normal tone, no cogwheel rigidity  Reflexes: 2+ over biceps, triceps and brachioradialis tendons    2+ over the patellar and achilles tendons  Sensory: Intact to all modalities in both proximal and distal distributions  Coordination: Finger to nose and heel over shin to knee intact on both sides  Gait: Well balanced with normal arm swing        Imaging    MRI Results (most recent):    Results from Hospital Encounter encounter on 04/29/11   MRI SPINE LUMBAR WITHOUT CONTRAST  **Final Report**      ICD Codes / Adm. Diagnosis: 724.2   / LBP (LOW BACK PAIN)    Examination:  MR L SPINE WO CON  - [id number] - Apr 29 2011  6:55PM  Accession No:  [id number]  Reason:  lower back pain      REPORT:  CLINICAL HISTORY: Pain    INDICATION: Lower back pain    COMPARISON: None    TECHNIQUE: MR imaging of the lumbar spine was performed with sagittal T1,   T2, STIR;  axial T1, T2. Contrast was not administered. FINDINGS:    There is normal alignment of the lumbar spine. Vertebral body heights are   maintained. Marrow signal is normal.  The conus medullaris terminates at L1.     L1/2:  The spinal canal and neuroforamina are widely patent. L2/3:  Facet arthropathy and hypertrophy. Ligamentum flavum hypertrophy. The   spinal canal and neuroforamina are widely patent. L3/4:  Facet arthropathy and hypertrophy. Ligamentum flavum hypertrophy. spinal canal and neuroforamina are widely patent> . L4/5:  Moderate central disc protrusion. Mild facet arthropathy. Mild   central canal stenosis. This desiccation and loss of disc height. Facet   arthropathy and hypertrophy. Ligamentum flavum hypertrophy. The   neuroforamina are widely patent. L5/S1:  Mild central disc protrusion. The spinal canal and neuroforamina   are widely patent.     The visualized musculature and intraperitoneal structures are within normal   limits       IMPRESSION:  Multilevel degenerative changes most severe at L4-L5 where there is moderate   central disc protrusion and mild central canal stenosis.           Interpreting/Reading Doctor: Dale Molina ([id number])  Transcribed:  on 05/01/2011  Approved: Dale Molina ([id number])  05/01/2011          Distribution:  Attending Doctor: Caitlin Aquino              Lab Review    Lab Results   Component Value Date/Time    WBC 4.6 03/15/2016 03:03 PM    HCT 40.3 03/15/2016 03:03 PM    HGB 13.0 03/15/2016 03:03 PM    PLATELET 671 29/05/0285 03:03 PM       Lab Results   Component Value Date/Time    Sodium 142 03/15/2016 03:03 PM    Potassium 3.5 03/15/2016 03:03 PM    Chloride 105 03/15/2016 03:03 PM    CO2 28 03/15/2016 03:03 PM    Glucose 121 03/15/2016 03:03 PM    BUN 6 03/15/2016 03:03 PM    Creatinine 0.70 03/15/2016 03:03 PM    Calcium 8.8 03/15/2016 03:03 PM

## 2017-08-17 NOTE — MR AVS SNAPSHOT
Visit Information Date & Time Provider Department Dept. Phone Encounter #  
 8/17/2017  8:40 AM Jessica Dozier MD [facility] [phone] [id number] Follow-up Instructions Return in about 3 months (around 11/17/2017). Upcoming Health Maintenance Date Due DTaP/Tdap/Td series (1 - Tdap) 9/2/1998 PAP AKA CERVICAL CYTOLOGY 9/2/1998 INFLUENZA AGE 9 TO ADULT 8/1/2017 Allergies as of 8/17/2017  Review Complete On: 8/17/2017 By: Jessica Dozier MD  
  
 Severity Noted Reaction Type Reactions Paxil [Paroxetine Hcl] High 05/17/2017    Anaphylaxis Erythromycin  12/17/2010    Nausea and Vomiting Current Immunizations  Reviewed on 12/19/2012 No immunizations on file. Not reviewed this visit You Were Diagnosed With   
  
 Codes Comments Intractable chronic migraine without aura and with status migrainosus    -  Primary ICD-10-CM: M12.033 ICD-9-CM: 346.73 Anxiety     ICD-10-CM: F41.9 ICD-9-CM: 300.00 Attention deficit     ICD-10-CM: R41.840 ICD-9-CM: 799.51 Attention deficit disorder (ADD)     ICD-10-CM: X26.1 ICD-9-CM: 314.00 Vitals BP Pulse Height(growth percentile) Weight(growth percentile) SpO2 BMI  
 110/72 88 5' 5\" (1.651 m) 172 lb (78 kg) 98% 28.62 kg/m2 OB Status Smoking Status Hysterectomy Former Smoker Vitals History BMI and BSA Data Body Mass Index Body Surface Area  
 28.62 kg/m 2 1.89 m 2 Preferred Pharmacy Pharmacy Name Phone [facility] [address] [phone] Your Updated Medication List  
  
   
This list is accurate as of: 8/17/17  9:13 AM.  Always use your most recent med list.  
  
  
  
  
 baclofen 10 mg tablet Commonly known as:  LIORESAL Take  by mouth four (4) times daily. butalbital-acetaminophen-caffeine -40 mg per tablet Commonly known as:  Gwynda Duet Take 1 Tab by mouth every six (6) hours as needed for Pain. Max Daily Amount: 2 Tabs. cetirizine 10 mg tablet Commonly known as:  ZYRTEC Take  by mouth daily. FERATE 240 mg (27 mg iron) tablet Generic drug:  ferrous gluconate Take 240 mg by mouth daily. * frovatriptan 2.5 mg tablet Commonly known as:  [place] Take 1 Tab by mouth once as needed for Migraine. Can take a second dose two hours later, max 2 daily * frovatriptan 2.5 mg tablet Commonly known as:  [place] Take 1 Tab by mouth once as needed for Migraine for up to 1 dose. gabapentin 600 mg tablet Commonly known as:  NEURONTIN Take 1 Tab by mouth three (3) times daily. ibuprofen 600 mg tablet Commonly known as:  MOTRIN Take 1 Tab by mouth every six (6) hours as needed for Pain. * methylphenidate HCl 5 mg tablet Commonly known as:  RITALIN Take 1 Tab (5 mg total) by mouth two (2) times a day. Max Daily Amount: 10 mg  
  
 * methylphenidate HCl 5 mg tablet Commonly known as:  RITALIN Take 1 Tab (5 mg total) by mouth two (2) times a dayEarliest Fill Date: 9/17/17. Max Daily Amount: 10 mg  
Start taking on:  9/17/2017 * methylphenidate HCl 5 mg tablet Commonly known as:  RITALIN Take 1 Tab (5 mg total) by mouth two (2) times a dayEarliest Fill Date: 10/17/17. Max Daily Amount: 10 mg  
Start taking on:  10/17/2017 PREMARIN 1.25 mg tablet Generic drug:  conjugated estrogens Take 1.25 mg by mouth daily. propranolol 10 mg tablet Commonly known as:  INDERAL Take 1 Tab by mouth three (3) times daily. traMADol 300 mg tablet Commonly known as:  ULTRAM-ER Take 300 mg by mouth daily. venlafaxine- mg capsule Commonly known as:  EFFEXOR-XR Take 1 Cap by mouth daily (with breakfast). * Notice:   This list has 5 medication(s) that are the same as other medications prescribed for you. Read the directions carefully, and ask your doctor or other care provider to review them with you. Prescriptions Printed Refills  
 methylphenidate HCl (RITALIN) 5 mg tablet 0 Starting on: 10/17/2017 Sig: Take 1 Tab (5 mg total) by mouth two (2) times a dayEarliest Fill Date: 10/17/17. Max Daily Amount: 10 mg  
 Class: Print Route: Oral  
 methylphenidate HCl (RITALIN) 5 mg tablet 0 Starting on: 9/17/2017 Sig: Take 1 Tab (5 mg total) by mouth two (2) times a dayEarliest Fill Date: 9/17/17. Max Daily Amount: 10 mg  
 Class: Print Route: Oral  
 methylphenidate HCl (RITALIN) 5 mg tablet 0 Sig: Take 1 Tab (5 mg total) by mouth two (2) times a day. Max Daily Amount: 10 mg  
 Class: Print Route: Oral  
  
Prescriptions Sent to Pharmacy Refills  
 propranolol (INDERAL) 10 mg tablet 0 Sig: Take 1 Tab by mouth three (3) times daily. Class: Normal  
 Pharmacy: [address] Ph #: [phone] Route: Oral  
 venlafaxine-SR (EFFEXOR-XR) 150 mg capsule 3 Sig: Take 1 Cap by mouth daily (with breakfast). Class: Normal  
 Pharmacy: [address] Ph #: [phone] Route: Oral  
  
Follow-up Instructions Return in about 3 months (around 11/17/2017). Patient Instructions A Healthy Lifestyle: Care Instructions Your Care Instructions A healthy lifestyle can help you feel good, stay at a healthy weight, and have plenty of energy for both work and play. A healthy lifestyle is something you can share with your whole family. A healthy lifestyle also can lower your risk for serious health problems, such as high blood pressure, heart disease, and diabetes. You can follow a few steps listed below to improve your health and the health of your family. Follow-up care is a key part of your treatment and safety.  Be sure to make and go to all appointments, and call your doctor if you are having problems. Its also a good idea to know your test results and keep a list of the medicines you take. How can you care for yourself at home? · Do not eat too much sugar, fat, or fast foods. You can still have dessert and treats now and then. The goal is moderation. · Start small to improve your eating habits. Pay attention to portion sizes, drink less juice and soda pop, and eat more fruits and vegetables. ¨ Eat a healthy amount of food. A 3-ounce serving of meat, for example, is about the size of a deck of cards. Fill the rest of your plate with vegetables and whole grains. ¨ Limit the amount of soda and sports drinks you have every day. Drink more water when you are thirsty. ¨ Eat at least 5 servings of fruits and vegetables every day. It may seem like a lot, but it is not hard to reach this goal. A serving or helping is 1 piece of fruit, 1 cup of vegetables, or 2 cups of leafy, raw vegetables. Have an apple or some carrot sticks as an afternoon snack instead of a candy bar. Try to have fruits and/or vegetables at every meal. 
· Make exercise part of your daily routine. You may want to start with simple activities, such as walking, bicycling, or slow swimming. Try to be active 30 to 60 minutes every day. You do not need to do all 30 to 60 minutes all at once. For example, you can exercise 3 times a day for 10 or 20 minutes. Moderate exercise is safe for most people, but it is always a good idea to talk to your doctor before starting an exercise program. 
· Keep moving. Dave Camera the lawn, work in the garden, or [employer]. Take the stairs instead of the elevator at work. · If you smoke, quit. People who smoke have an increased risk for heart attack, stroke, cancer, and other lung illnesses. Quitting is hard, but there are ways to boost your chance of quitting tobacco for good. ¨ Use nicotine gum, patches, or lozenges. ¨ Ask your doctor about stop-smoking programs and medicines. ¨ Keep trying. In addition to reducing your risk of diseases in the future, you will notice some benefits soon after you stop using tobacco. If you have shortness of breath or asthma symptoms, they will likely get better within a few weeks after you quit. · Limit how much alcohol you drink. Moderate amounts of alcohol (up to 2 drinks a day for men, 1 drink a day for women) are okay. But drinking too much can lead to liver problems, high blood pressure, and other health problems. Family health If you have a family, there are many things you can do together to improve your health. · Eat meals together as a family as often as possible. · Eat healthy foods. This includes fruits, vegetables, lean meats and dairy, and whole grains. · Include your family in your fitness plan. Most people think of activities such as jogging or tennis as the way to fitness, but there are many ways you and your family can be more active. Anything that makes you breathe hard and gets your heart pumping is exercise. Here are some tips: 
¨ Walk to do errands or to take your child to school or the bus. ¨ Go for a family bike ride after dinner instead of watching TV. Where can you learn more? Go to http://yoly-chelsea.info/. Enter D870 in the search box to learn more about \"A Healthy Lifestyle: Care Instructions. \" Current as of: July 26, 2016 Content Version: 11.3 © 6063-9448 [employer]. Care instructions adapted under license by [employer] (which disclaims liability or warranty for this information). If you have questions about a medical condition or this instruction, always ask your healthcare professional. [place] [address] any warranty or liability for your use of this information. PRES[facility] REFILL POLICY [facility] Statement to Patients April 1, 2014 In an effort to ensure the large volume of patient prescription refills is processed in the most efficient and expeditious manner, we are asking our patients to assist us by calling your Pharmacy for all prescription refills, this will include also your  Mail Order Pharmacy. The pharmacy will contact our office electronically to continue the refill process. Please do not wait until the last minute to call your pharmacy. We need at least 48 hours (2days) to fill prescriptions. We also encourage you to call your pharmacy before going to  your prescription to make sure it is ready. With regard to controlled substance prescription refill requests (narcotic refills) that need to be picked up at our office, we ask your cooperation by providing us with at least 72 hours (3days) notice that you will need a refill. We will not refill narcotic prescription refill requests after 4:00pm on any weekday, Monday through Thursday, or after 2:00pm on Fridays, or on the weekends. We encourage everyone to explore another way of getting your prescription refill request processed using [employer], our patient web portal through our electronic medical record system. [employer] is an efficient and effective way to communicate your medication request directly to the office and  downloadable as an feng on your smart phone . [employer] also features a review functionality that allows you to view your medication list as well as leave messages for your physician. Are you ready to get connected? If so please review the attatched instructions or speak to any of our staff to get you set up right away! Thank you so much for your cooperation. Should you have any questions please contact our Practice Administrator. The Physicians and Staff,  [facility] SERVICES! Dear Frankey Jupiter: Thank you for requesting a [employer] account.   Our records indicate that you already have an active Betyah account. You can access your account anytime at [email or web address] Did you know that you can access your hospital and ER discharge instructions at any time in Betyah? You can also review all of your test results from your hospital stay or ER visit. Additional Information If you have questions, please visit the Frequently Asked Questions section of the Betyah website at [email or web address] Remember, Betyah is NOT to be used for urgent needs. For medical emergencies, dial 911. Now available from your iPhone and Android! Please provide this summary of care documentation to your next provider. Your primary care clinician is listed as TYREE Steele. If you have any questions after today's visit, please call [phone].

## 2017-08-17 NOTE — PATIENT INSTRUCTIONS
A Healthy Lifestyle: Care Instructions  Your Care Instructions  A healthy lifestyle can help you feel good, stay at a healthy weight, and have plenty of energy for both work and play. A healthy lifestyle is something you can share with your whole family. A healthy lifestyle also can lower your risk for serious health problems, such as high blood pressure, heart disease, and diabetes. You can follow a few steps listed below to improve your health and the health of your family. Follow-up care is a key part of your treatment and safety. Be sure to make and go to all appointments, and call your doctor if you are having problems. Its also a good idea to know your test results and keep a list of the medicines you take. How can you care for yourself at home? · Do not eat too much sugar, fat, or fast foods. You can still have dessert and treats now and then. The goal is moderation. · Start small to improve your eating habits. Pay attention to portion sizes, drink less juice and soda pop, and eat more fruits and vegetables. ¨ Eat a healthy amount of food. A 3-ounce serving of meat, for example, is about the size of a deck of cards. Fill the rest of your plate with vegetables and whole grains. ¨ Limit the amount of soda and sports drinks you have every day. Drink more water when you are thirsty. ¨ Eat at least 5 servings of fruits and vegetables every day. It may seem like a lot, but it is not hard to reach this goal. A serving or helping is 1 piece of fruit, 1 cup of vegetables, or 2 cups of leafy, raw vegetables. Have an apple or some carrot sticks as an afternoon snack instead of a candy bar. Try to have fruits and/or vegetables at every meal.  · Make exercise part of your daily routine. You may want to start with simple activities, such as walking, bicycling, or slow swimming. Try to be active 30 to 60 minutes every day. You do not need to do all 30 to 60 minutes all at once.  For example, you can exercise 3 times a day for 10 or 20 minutes. Moderate exercise is safe for most people, but it is always a good idea to talk to your doctor before starting an exercise program.  · Keep moving. Jeffrey Elizondo the lawn, work in the garden, or [employer]. Take the stairs instead of the elevator at work. · If you smoke, quit. People who smoke have an increased risk for heart attack, stroke, cancer, and other lung illnesses. Quitting is hard, but there are ways to boost your chance of quitting tobacco for good. ¨ Use nicotine gum, patches, or lozenges. ¨ Ask your doctor about stop-smoking programs and medicines. ¨ Keep trying. In addition to reducing your risk of diseases in the future, you will notice some benefits soon after you stop using tobacco. If you have shortness of breath or asthma symptoms, they will likely get better within a few weeks after you quit. · Limit how much alcohol you drink. Moderate amounts of alcohol (up to 2 drinks a day for men, 1 drink a day for women) are okay. But drinking too much can lead to liver problems, high blood pressure, and other health problems. Family health  If you have a family, there are many things you can do together to improve your health. · Eat meals together as a family as often as possible. · Eat healthy foods. This includes fruits, vegetables, lean meats and dairy, and whole grains. · Include your family in your fitness plan. Most people think of activities such as jogging or tennis as the way to fitness, but there are many ways you and your family can be more active. Anything that makes you breathe hard and gets your heart pumping is exercise. Here are some tips:  ¨ Walk to do errands or to take your child to school or the bus. ¨ Go for a family bike ride after dinner instead of watching TV. Where can you learn more? Go to http://yoly-chelsea.info/. Enter M720 in the search box to learn more about \"A Healthy Lifestyle: Care Instructions. \"  Current as of: July 26, 2016  Content Version: 11.3  © 7389-3751 [employer], [employer]. Care instructions adapted under license by [employer] (which disclaims liability or warranty for this information). If you have questions about a medical condition or this instruction, always ask your healthcare professional. [address] any warranty or liability for your use of this information. [facility]   Statement to Patients  April 1, 2014      In an effort to ensure the large volume of patient prescription refills is processed in the most efficient and expeditious manner, we are asking our patients to assist us by calling your Pharmacy for all prescription refills, this will include also your  Mail Order Pharmacy. The pharmacy will contact our office electronically to continue the refill process. Please do not wait until the last minute to call your pharmacy. We need at least 48 hours (2days) to fill prescriptions. We also encourage you to call your pharmacy before going to  your prescription to make sure it is ready. With regard to controlled substance prescription refill requests (narcotic refills) that need to be picked up at our office, we ask your cooperation by providing us with at least 72 hours (3days) notice that you will need a refill. We will not refill narcotic prescription refill requests after 4:00pm on any weekday, Monday through Thursday, or after 2:00pm on Fridays, or on the weekends. We encourage everyone to explore another way of getting your prescription refill request processed using [employer], our patient web portal through our electronic medical record system. [employer] is an efficient and effective way to communicate your medication request directly to the office and  downloadable as an feng on your smart phone .  [employer] also features a review functionality that allows you to view your medication list as well as leave messages for your physician. Are you ready to get connected? If so please review the attatched instructions or speak to any of our staff to get you set up right away! Thank you so much for your cooperation. Should you have any questions please contact our Practice Administrator.     The Physicians and Staff,  [facility]

## 2017-08-27 DIAGNOSIS — G43.711 INTRACTABLE CHRONIC MIGRAINE WITHOUT AURA AND WITH STATUS MIGRAINOSUS: ICD-10-CM

## 2017-08-29 RX ORDER — FROVATRIPTAN SUCCINATE 2.5 MG/1
TABLET, FILM COATED ORAL
Qty: 9 TAB | Refills: 2 | Status: SHIPPED | OUTPATIENT
Start: 2017-08-29 | End: 2017-10-31 | Stop reason: SDUPTHER

## 2017-10-31 DIAGNOSIS — G43.711 INTRACTABLE CHRONIC MIGRAINE WITHOUT AURA AND WITH STATUS MIGRAINOSUS: ICD-10-CM

## 2017-10-31 RX ORDER — FROVATRIPTAN SUCCINATE 2.5 MG/1
TABLET, FILM COATED ORAL
Qty: 9 TAB | Refills: 1 | Status: SHIPPED | OUTPATIENT
Start: 2017-10-31 | End: 2017-11-29 | Stop reason: SDUPTHER

## 2017-11-16 ENCOUNTER — OFFICE VISIT (OUTPATIENT)
Dept: NEUROLOGY | Age: 42
End: 2017-11-16

## 2017-11-16 VITALS
OXYGEN SATURATION: 96 % | SYSTOLIC BLOOD PRESSURE: 110 MMHG | WEIGHT: 178 LBS | BODY MASS INDEX: 29.66 KG/M2 | HEIGHT: 65 IN | HEART RATE: 96 BPM | DIASTOLIC BLOOD PRESSURE: 72 MMHG

## 2017-11-16 DIAGNOSIS — G89.29 CHRONIC BILATERAL LOW BACK PAIN WITHOUT SCIATICA: ICD-10-CM

## 2017-11-16 DIAGNOSIS — M54.50 CHRONIC BILATERAL LOW BACK PAIN WITHOUT SCIATICA: ICD-10-CM

## 2017-11-16 DIAGNOSIS — G43.719 INTRACTABLE CHRONIC MIGRAINE WITHOUT AURA AND WITHOUT STATUS MIGRAINOSUS: Primary | ICD-10-CM

## 2017-11-16 RX ORDER — METHYLPHENIDATE HYDROCHLORIDE 5 MG/1
5 TABLET ORAL 2 TIMES DAILY
Qty: 60 TAB | Refills: 0 | Status: SHIPPED | OUTPATIENT
Start: 2018-01-15 | End: 2018-02-06 | Stop reason: SDUPTHER

## 2017-11-16 RX ORDER — METHYLPHENIDATE HYDROCHLORIDE 5 MG/1
5 TABLET ORAL 2 TIMES DAILY
Qty: 60 TAB | Refills: 0 | Status: SHIPPED | OUTPATIENT
Start: 2017-11-16 | End: 2018-02-06 | Stop reason: SDUPTHER

## 2017-11-16 RX ORDER — METHYLPHENIDATE HYDROCHLORIDE 5 MG/1
5 TABLET ORAL 2 TIMES DAILY
Qty: 60 TAB | Refills: 0 | Status: SHIPPED | OUTPATIENT
Start: 2017-12-16 | End: 2018-02-06 | Stop reason: SDUPTHER

## 2017-11-16 RX ORDER — PROPRANOLOL HYDROCHLORIDE 60 MG/1
60 CAPSULE, EXTENDED RELEASE ORAL DAILY
Qty: 30 CAP | Refills: 5 | Status: SHIPPED | OUTPATIENT
Start: 2017-11-16 | End: 2020-01-07 | Stop reason: ALTCHOICE

## 2017-11-16 NOTE — PATIENT INSTRUCTIONS
[address] [facility]   Statement to Patients  April 1, 2014      In an effort to ensure the large volume of patient prescription refills is processed in the most efficient and expeditious manner, we are asking our patients to assist us by calling your Pharmacy for all prescription refills, this will include also your  Mail Order Pharmacy. The pharmacy will contact our office electronically to continue the refill process. Please do not wait until the last minute to call your pharmacy. We need at least 48 hours (2days) to fill prescriptions. We also encourage you to call your pharmacy before going to  your prescription to make sure it is ready. With regard to controlled substance prescription refill requests (narcotic refills) that need to be picked up at our office, we ask your cooperation by providing us with at least 72 hours (3days) notice that you will need a refill. We will not refill narcotic prescription refill requests after 4:00pm on any weekday, Monday through Thursday, or after 2:00pm on Fridays, or on the weekends. We encourage everyone to explore another way of getting your prescription refill request processed using [employer], our patient web portal through our electronic medical record system. [employer] is an efficient and effective way to communicate your medication request directly to the office and  downloadable as an feng on your smart phone . [employer] also features a review functionality that allows you to view your medication list as well as leave messages for your physician. Are you ready to get connected? If so please review the attatched instructions or speak to any of our staff to get you set up right away! Thank you so much for your cooperation. Should you have any questions please contact our Practice Administrator.     The Physicians and Staff,  [facility]     Please be advised there is a $25 fee for all paperwork to be completed from our  providers. This is to be paid by the patient prior to picking up the completed forms. A Healthy Lifestyle: Care Instructions  Your Care Instructions    A healthy lifestyle can help you feel good, stay at a healthy weight, and have plenty of energy for both work and play. A healthy lifestyle is something you can share with your whole family. A healthy lifestyle also can lower your risk for serious health problems, such as high blood pressure, heart disease, and diabetes. You can follow a few steps listed below to improve your health and the health of your family. Follow-up care is a key part of your treatment and safety. Be sure to make and go to all appointments, and call your doctor if you are having problems. It's also a good idea to know your test results and keep a list of the medicines you take. How can you care for yourself at home? · Do not eat too much sugar, fat, or fast foods. You can still have dessert and treats now and then. The goal is moderation. · Start small to improve your eating habits. Pay attention to portion sizes, drink less juice and soda pop, and eat more fruits and vegetables. ¨ Eat a healthy amount of food. A 3-ounce serving of meat, for example, is about the size of a deck of cards. Fill the rest of your plate with vegetables and whole grains. ¨ Limit the amount of soda and sports drinks you have every day. Drink more water when you are thirsty. ¨ Eat at least 5 servings of fruits and vegetables every day. It may seem like a lot, but it is not hard to reach this goal. A serving or helping is 1 piece of fruit, 1 cup of vegetables, or 2 cups of leafy, raw vegetables. Have an apple or some carrot sticks as an afternoon snack instead of a candy bar. Try to have fruits and/or vegetables at every meal.  · Make exercise part of your daily routine. You may want to start with simple activities, such as walking, bicycling, or slow swimming.  Try to be active 30 to 60 minutes every day. You do not need to do all 30 to 60 minutes all at once. For example, you can exercise 3 times a day for 10 or 20 minutes. Moderate exercise is safe for most people, but it is always a good idea to talk to your doctor before starting an exercise program.  · Keep moving. Trish Jason the lawn, work in the garden, or [employer]. Take the stairs instead of the elevator at work. · If you smoke, quit. People who smoke have an increased risk for heart attack, stroke, cancer, and other lung illnesses. Quitting is hard, but there are ways to boost your chance of quitting tobacco for good. ¨ Use nicotine gum, patches, or lozenges. ¨ Ask your doctor about stop-smoking programs and medicines. ¨ Keep trying. In addition to reducing your risk of diseases in the future, you will notice some benefits soon after you stop using tobacco. If you have shortness of breath or asthma symptoms, they will likely get better within a few weeks after you quit. · Limit how much alcohol you drink. Moderate amounts of alcohol (up to 2 drinks a day for men, 1 drink a day for women) are okay. But drinking too much can lead to liver problems, high blood pressure, and other health problems. Family health  If you have a family, there are many things you can do together to improve your health. · Eat meals together as a family as often as possible. · Eat healthy foods. This includes fruits, vegetables, lean meats and dairy, and whole grains. · Include your family in your fitness plan. Most people think of activities such as jogging or tennis as the way to fitness, but there are many ways you and your family can be more active. Anything that makes you breathe hard and gets your heart pumping is exercise. Here are some tips:  ¨ Walk to do errands or to take your child to school or the bus. ¨ Go for a family bike ride after dinner instead of watching TV. Where can you learn more?   Go to http://yoly-chelsea.info/. Enter C574 in the search box to learn more about \"A Healthy Lifestyle: Care Instructions. \"  Current as of: May 12, 2017  Content Version: 11.4  © 3412-5887 [employer], [email or web address]. Care instructions adapted under license by [employer] (which disclaims liability or warranty for this information). If you have questions about a medical condition or this instruction, always ask your healthcare professional. [address] any warranty or liability for your use of this information.

## 2017-11-16 NOTE — PROGRESS NOTES
Chief Complaint: migraines    Returns for follow up. She wants to try the propranolol extended release. She is content with her medications. She would like to increase the dose of propranolol because she finds her headaches though controlled are starting to break through. Her ADD seems to be controlled on her current dosage of adderall she finds her self able to stay on task. Depression is similiarly under good control She finds herself enjoying her life and tolerating work and sleeping well. Assesment and Plan  1. Intractable chronic migraine without aura and with status migrainosus  Continue propranolol ER    2. Anxiety  - venlafaxine-SR (EFFEXOR-XR) 150 mg capsule; Take 1 Cap by mouth daily (with breakfast). Dispense: 90 Cap; Refill: 3    3. Attention deficit hyperactivity disorder (ADHD), unspecified ADHD type  - methylphenidate (RITALIN) 5 mg tablet; Take 1 Tab (5 mg total) by mouth two (2) times a day. Max Daily Amount: 10 mg  Dispense: 60 Tab; Refill: 0      Allergies  Paxil [paroxetine hcl] and Erythromycin     Medications  Current Outpatient Prescriptions   Medication Sig    propranolol (INDERAL) 10 mg tablet Take 1 Tab by mouth two (2) times a day.  baclofen (LIORESAL) 10 mg tablet Take  by mouth four (4) times daily.  ibuprofen (MOTRIN) 600 mg tablet Take 1 Tab by mouth every six (6) hours as needed for Pain.  methylphenidate (RITALIN) 5 mg tablet Take 1 Tab (5 mg total) by mouth two (2) times a dayEarliest Fill Date: 4/18/17. Max Daily Amount: 10 mg    venlafaxine-SR (EFFEXOR-XR) 150 mg capsule Take 1 Cap by mouth daily (with breakfast).  frovatriptan (FROVA) 2.5 mg tablet Take 1 Tab by mouth once as needed for Migraine. Can take a second dose two hours later, max 2 daily    cetirizine (ZYRTEC) 10 mg tablet Take  by mouth daily.  ferrous gluconate (FERATE) 240 mg (27 mg iron) tablet Take 240 mg by mouth daily.     traMADol (ULTRAM-ER) 300 mg tablet Take 300 mg by mouth daily.    conjugated estrogens (PREMARIN) 1.25 mg tablet Take 1.25 mg by mouth daily.  gabapentin (NEURONTIN) 600 mg tablet Take 1 Tab by mouth three (3) times daily. (Patient taking differently: Take 600 mg by mouth five (5) times daily.)    butalbital-acetaminophen-caffeine (FIORICET, ESGIC) -40 mg per tablet Take 1 Tab by mouth every six (6) hours as needed for Pain. Max Daily Amount: 2 Tabs. No current facility-administered medications for this visit. Medical History  Past Medical History:   Diagnosis Date    Anxiety     Arthritis     back    Asthma     has not required tx in past 10 years    Chronic pain     lumbar    Dyspepsia and other specified disorders of function of stomach     Hypertension     Migraine     Nausea & vomiting      Review of Systems   Constitutional: Negative for chills and fever. HENT: Negative for ear pain. Eyes: Negative for pain and discharge. Respiratory: Negative for cough and hemoptysis. Cardiovascular: Negative for chest pain and claudication. Gastrointestinal: Negative for constipation and diarrhea. Genitourinary: Negative for flank pain and hematuria. Musculoskeletal: Negative for back pain and myalgias. Skin: Negative for itching and rash. Neurological: Negative for tingling and tremors. Migraine headaches   Endo/Heme/Allergies: Negative for environmental allergies. Does not bruise/bleed easily. Psychiatric/Behavioral: Negative for depression and hallucinations.        Exam:    Visit Vitals    /62    Pulse 77    Wt 169 lb (76.7 kg)    SpO2 98%    BMI 28.12 kg/m2      Gen Appearance: Well built no apparent distress  HEENT : Normocephalic atraumatic with anicteric sclera  Neck: Supple with no thyromegaly   Chest: Clear to auscultation, no wheezes or rubs    CardioVascular:   Regular in rhythm and rate with no murmurs  Carotids no bruit  No pedal edema    Abdomen: Soft non tender, distended with normal bowel sounds  Ext: Full range of motion  Skin: Supple, No rash    Neuro:  Awake alert and oriented to person and place and time  Recent and remote memory is intact  Speech: No aphasia and no dysarthria, intact repetition. Cranial Nerves: II-XII intact  Eyes: Full bilateral visual fields by confrontation. PERRLA EOMI   Motor: 5/5 in all major muscle groups   No tremors  Normal tone, no cogwheel rigidity  Reflexes: 2+ over biceps, triceps and brachioradialis tendons    2+ over the patellar and achilles tendons  Sensory: Intact to all modalities in both proximal and distal distributions  Coordination: Finger to nose and heel over shin to knee intact on both sides  Gait: Well balanced with normal arm swing        Imaging    MRI Results (most recent):    Results from Hospital Encounter encounter on 04/29/11   MRI SPINE LUMBAR WITHOUT CONTRAST  **Final Report**      ICD Codes / Adm. Diagnosis: 724.2   / LBP (LOW BACK PAIN)    Examination:  MR BEE SPINE WO CON  - [id number] - Apr 29 2011  6:55PM  Accession No:  [id number]  Reason:  lower back pain      REPORT:  CLINICAL HISTORY: Pain    INDICATION: Lower back pain    COMPARISON: None    TECHNIQUE: MR imaging of the lumbar spine was performed with sagittal T1,   T2, STIR;  axial T1, T2. Contrast was not administered. FINDINGS:    There is normal alignment of the lumbar spine. Vertebral body heights are   maintained. Marrow signal is normal.  The conus medullaris terminates at L1.     L1/2:  The spinal canal and neuroforamina are widely patent. L2/3:  Facet arthropathy and hypertrophy. Ligamentum flavum hypertrophy. The   spinal canal and neuroforamina are widely patent. L3/4:  Facet arthropathy and hypertrophy. Ligamentum flavum hypertrophy. spinal canal and neuroforamina are widely patent> . L4/5:  Moderate central disc protrusion. Mild facet arthropathy. Mild   central canal stenosis. This desiccation and loss of disc height. Facet   arthropathy and hypertrophy. Ligamentum flavum hypertrophy. The   neuroforamina are widely patent. L5/S1:  Mild central disc protrusion. The spinal canal and neuroforamina   are widely patent. The visualized musculature and intraperitoneal structures are within normal   limits       IMPRESSION:  Multilevel degenerative changes most severe at L4-L5 where there is moderate   central disc protrusion and mild central canal stenosis.           Interpreting/Reading Doctor: Terrell Rush ([id number])  Transcribed:  on 05/01/2011  Approved: Terrell Rush ([id number])  05/01/2011          Distribution:  Attending Doctor: Nory Alejandra              Lab Review    Lab Results   Component Value Date/Time    WBC 4.6 03/15/2016 03:03 PM    HCT 40.3 03/15/2016 03:03 PM    HGB 13.0 03/15/2016 03:03 PM    PLATELET 235 89/70/4528 03:03 PM       Lab Results   Component Value Date/Time    Sodium 142 03/15/2016 03:03 PM    Potassium 3.5 03/15/2016 03:03 PM    Chloride 105 03/15/2016 03:03 PM    CO2 28 03/15/2016 03:03 PM    Glucose 121 03/15/2016 03:03 PM    BUN 6 03/15/2016 03:03 PM    Creatinine 0.70 03/15/2016 03:03 PM    Calcium 8.8 03/15/2016 03:03 PM

## 2017-11-16 NOTE — MR AVS SNAPSHOT
Visit Information Date & Time Provider Department Dept. Phone Encounter #  
 11/16/2017  9:00 AM Marisa Greenwood MD [facility] at [facility] [phone] [id number] Follow-up Instructions Return in about 3 months (around 2/16/2018) for MIGRAINE. Upcoming Health Maintenance Date Due DTaP/Tdap/Td series (1 - Tdap) 9/2/1998 PAP AKA CERVICAL CYTOLOGY 9/2/1998 Allergies as of 11/16/2017  Review Complete On: 11/16/2017 By: Marisa Greenwood MD  
  
 Severity Noted Reaction Type Reactions Paxil [Paroxetine Hcl] High 05/17/2017    Anaphylaxis Erythromycin  12/17/2010    Nausea and Vomiting Current Immunizations  Reviewed on 12/19/2012 Name Date Influenza Vaccine 9/28/2017 Zoster Vaccine, Live 9/28/2017 Not reviewed this visit You Were Diagnosed With   
  
 Codes Comments Intractable chronic migraine without aura and without status migrainosus    -  Primary ICD-10-CM: E91.325 ICD-9-CM: 346.71 Chronic bilateral low back pain without sciatica     ICD-10-CM: M54.5, G89.29 ICD-9-CM: 724.2, 338.29 Vitals BP Pulse Height(growth percentile) Weight(growth percentile) SpO2 BMI  
 110/72 96 5' 5\" (1.651 m) 178 lb (80.7 kg) 96% 29.62 kg/m2 OB Status Smoking Status Hysterectomy Former Smoker BMI and BSA Data Body Mass Index Body Surface Area  
 29.62 kg/m 2 1.92 m 2 Preferred Pharmacy Pharmacy Name Phone B[facility] N [address]. [phone] Your Updated Medication List  
  
   
This list is accurate as of: 11/16/17 10:32 AM.  Always use your most recent med list.  
  
  
  
  
 baclofen 10 mg tablet Commonly known as:  LIORESAL Take  by mouth four (4) times daily. butalbital-acetaminophen-caffeine -40 mg per tablet Commonly known as:  Miguel Lasso  
 Take 1 Tab by mouth every six (6) hours as needed for Pain. Max Daily Amount: 2 Tabs. cetirizine 10 mg tablet Commonly known as:  ZYRTEC Take  by mouth daily. FERATE 240 mg (27 mg iron) tablet Generic drug:  ferrous gluconate Take 240 mg by mouth daily. * frovatriptan 2.5 mg tablet Commonly known as:  Ludwig Kilts Take 1 Tab by mouth once as needed for Migraine. Can take a second dose two hours later, max 2 daily * frovatriptan 2.5 mg tablet Commonly known as:  Ludwig Kilts TAKE 1 TABLET BY MOUTH FOR 1 DOSE AS NEEDED FOR MIGRAINE  
  
 gabapentin 600 mg tablet Commonly known as:  NEURONTIN Take 1 Tab by mouth three (3) times daily. ibuprofen 600 mg tablet Commonly known as:  MOTRIN Take 1 Tab by mouth every six (6) hours as needed for Pain. * methylphenidate HCl 5 mg tablet Commonly known as:  RITALIN Take 1 Tab (5 mg total) by mouth two (2) times a day. Max Daily Amount: 10 mg  
  
 * methylphenidate HCl 5 mg tablet Commonly known as:  RITALIN Take 1 Tab (5 mg total) by mouth two (2) times a dayEarliest Fill Date: 9/17/17. Max Daily Amount: 10 mg  
  
 * methylphenidate HCl 5 mg tablet Commonly known as:  RITALIN Take 1 Tab (5 mg total) by mouth two (2) times a dayEarliest Fill Date: 10/17/17. Max Daily Amount: 10 mg  
  
 * methylphenidate HCl 5 mg tablet Commonly known as:  RITALIN Take 1 Tab (5 mg total) by mouth two (2) times a day. Max Daily Amount: 10 mg  
  
 * methylphenidate HCl 5 mg tablet Commonly known as:  RITALIN Take 1 Tab (5 mg total) by mouth two (2) times a dayEarliest Fill Date: 12/16/17. Max Daily Amount: 10 mg  
Start taking on:  12/16/2017 * methylphenidate HCl 5 mg tablet Commonly known as:  RITALIN Take 1 Tab (5 mg total) by mouth two (2) times a dayEarliest Fill Date: 1/15/18. Max Daily Amount: 10 mg  
Start taking on:  1/15/2018 PREMARIN 1.25 mg tablet Generic drug:  conjugated estrogens Take 1.25 mg by mouth daily. * propranolol 10 mg tablet Commonly known as:  INDERAL Take 1 Tab by mouth three (3) times daily. * propranolol LA 60 mg SR capsule Commonly known as:  INDERAL LA Take 1 Cap by mouth daily. venlafaxine- mg capsule Commonly known as:  EFFEXOR-XR Take 1 Cap by mouth daily (with breakfast). * Notice: This list has 10 medication(s) that are the same as other medications prescribed for you. Read the directions carefully, and ask your doctor or other care provider to review them with you. Prescriptions Printed Refills  
 methylphenidate HCl (RITALIN) 5 mg tablet 0 Sig: Take 1 Tab (5 mg total) by mouth two (2) times a day. Max Daily Amount: 10 mg  
 Class: Print Route: Oral  
 methylphenidate HCl (RITALIN) 5 mg tablet 0 Starting on: 12/16/2017 Sig: Take 1 Tab (5 mg total) by mouth two (2) times a dayEarliest Fill Date: 12/16/17. Max Daily Amount: 10 mg  
 Class: Print Route: Oral  
 methylphenidate HCl (RITALIN) 5 mg tablet 0 Starting on: 1/15/2018 Sig: Take 1 Tab (5 mg total) by mouth two (2) times a dayEarliest Fill Date: 1/15/18. Max Daily Amount: 10 mg  
 Class: Print Route: Oral  
  
Prescriptions Sent to Pharmacy Refills  
 propranolol LA (INDERAL LA) 60 mg SR capsule 5 Sig: Take 1 Cap by mouth daily. Class: Normal  
 Pharmacy: [address] [place], [address] [place] [address].  #: [phone] Route: Oral  
  
Follow-up Instructions Return in about 3 months (around 2/16/2018) for MIGRAINE. Patient Instructions PRESCRIPTION REFILL POLICY [facility] Statement to Patients April 1, 2014 In an effort to ensure the large volume of patient prescription refills is processed in the most efficient and expeditious manner, we are asking our patients to assist us by calling your Pharmacy for all prescription refills, this will include also your  Mail Order Pharmacy. The pharmacy will contact our office electronically to continue the refill process. Please do not wait until the last minute to call your pharmacy. We need at least 48 hours (2days) to fill prescriptions. We also encourage you to call your pharmacy before going to  your prescription to make sure it is ready. With regard to controlled substance prescription refill requests (narcotic refills) that need to be picked up at our office, we ask your cooperation by providing us with at least 72 hours (3days) notice that you will need a refill. We will not refill narcotic prescription refill requests after 4:00pm on any weekday, Monday through Thursday, or after 2:00pm on Fridays, or on the weekends. We encourage everyone to explore another way of getting your prescription refill request processed using [employer], our patient web portal through our electronic medical record system. [employer] is an efficient and effective way to communicate your medication request directly to the office and  downloadable as an feng on your smart phone . [employer] also features a review functionality that allows you to view your medication list as well as leave messages for your physician. Are you ready to get connected? If so please review the attatched instructions or speak to any of our staff to get you set up right away! Thank you so much for your cooperation. Should you have any questions please contact our Practice Administrator. The Physicians and Staff,  [facility] Please be advised there is a $25 fee for all paperwork to be completed from our  providers. This is to be paid by the patient prior to picking up the completed forms. A Healthy Lifestyle: Care Instructions Your Care Instructions A healthy lifestyle can help you feel good, stay at a healthy weight, and have plenty of energy for both work and play. A healthy lifestyle is something you can share with your whole family. A healthy lifestyle also can lower your risk for serious health problems, such as high blood pressure, heart disease, and diabetes. You can follow a few steps listed below to improve your health and the health of your family. Follow-up care is a key part of your treatment and safety. Be sure to make and go to all appointments, and call your doctor if you are having problems. It's also a good idea to know your test results and keep a list of the medicines you take. How can you care for yourself at home? · Do not eat too much sugar, fat, or fast foods. You can still have dessert and treats now and then. The goal is moderation. · Start small to improve your eating habits. Pay attention to portion sizes, drink less juice and soda pop, and eat more fruits and vegetables. ¨ Eat a healthy amount of food. A 3-ounce serving of meat, for example, is about the size of a deck of cards. Fill the rest of your plate with vegetables and whole grains. ¨ Limit the amount of soda and sports drinks you have every day. Drink more water when you are thirsty. ¨ Eat at least 5 servings of fruits and vegetables every day. It may seem like a lot, but it is not hard to reach this goal. A serving or helping is 1 piece of fruit, 1 cup of vegetables, or 2 cups of leafy, raw vegetables. Have an apple or some carrot sticks as an afternoon snack instead of a candy bar. Try to have fruits and/or vegetables at every meal. 
· Make exercise part of your daily routine. You may want to start with simple activities, such as walking, bicycling, or slow swimming. Try to be active 30 to 60 minutes every day. You do not need to do all 30 to 60 minutes all at once. For example, you can exercise 3 times a day for 10 or 20 minutes.  Moderate exercise is safe for most people, but it is always a good idea to talk to your doctor before starting an exercise program. 
· Keep moving. Irving Faulkner the lawn, work in the garden, or [employer]. Take the stairs instead of the elevator at work. · If you smoke, quit. People who smoke have an increased risk for heart attack, stroke, cancer, and other lung illnesses. Quitting is hard, but there are ways to boost your chance of quitting tobacco for good. ¨ Use nicotine gum, patches, or lozenges. ¨ Ask your doctor about stop-smoking programs and medicines. ¨ Keep trying. In addition to reducing your risk of diseases in the future, you will notice some benefits soon after you stop using tobacco. If you have shortness of breath or asthma symptoms, they will likely get better within a few weeks after you quit. · Limit how much alcohol you drink. Moderate amounts of alcohol (up to 2 drinks a day for men, 1 drink a day for women) are okay. But drinking too much can lead to liver problems, high blood pressure, and other health problems. Family health If you have a family, there are many things you can do together to improve your health. · Eat meals together as a family as often as possible. · Eat healthy foods. This includes fruits, vegetables, lean meats and dairy, and whole grains. · Include your family in your fitness plan. Most people think of activities such as jogging or tennis as the way to fitness, but there are many ways you and your family can be more active. Anything that makes you breathe hard and gets your heart pumping is exercise. Here are some tips: 
¨ Walk to do errands or to take your child to school or the bus. ¨ Go for a family bike ride after dinner instead of watching TV. Where can you learn more? Go to http://yoly-chelsea.info/. Enter Q306 in the search box to learn more about \"A Healthy Lifestyle: Care Instructions. \" Current as of: May 12, 2017 Content Version: 11.4 © 2856-6945 [employer]wise[employer]. Care instructions adapted under license by [employer] (which disclaims liability or warranty for this information). If you have questions about a medical condition or this instruction, always ask your healthcare professional. [address] any warranty or liability for your use of this information. Introducing [facility]! Dear Domi Handy: Thank you for requesting a aBIZinaBOX account. Our records indicate that you already have an active aBIZinaBOX account. You can access your account anytime at [email or web address] Did you know that you can access your hospital and ER discharge instructions at any time in aBIZinaBOX? You can also review all of your test results from your hospital stay or ER visit. Additional Information If you have questions, please visit the Frequently Asked Questions section of the aBIZinaBOX website at [email or web address] Remember, aBIZinaBOX is NOT to be used for urgent needs. For medical emergencies, dial 911. Now available from your iPhone and Android! Please provide this summary of care documentation to your next provider. Your primary care clinician is listed as TYREE Steele. If you have any questions after today's visit, please call [phone].

## 2017-11-29 DIAGNOSIS — G43.711 INTRACTABLE CHRONIC MIGRAINE WITHOUT AURA AND WITH STATUS MIGRAINOSUS: ICD-10-CM

## 2017-11-29 RX ORDER — FROVATRIPTAN SUCCINATE 2.5 MG/1
TABLET, FILM COATED ORAL
Qty: 9 TAB | Refills: 0 | Status: SHIPPED | OUTPATIENT
Start: 2017-11-29 | End: 2017-12-26 | Stop reason: SDUPTHER

## 2017-12-22 ENCOUNTER — TELEPHONE (OUTPATIENT)
Dept: NEUROLOGY | Age: 42
End: 2017-12-22

## 2017-12-26 DIAGNOSIS — G43.711 INTRACTABLE CHRONIC MIGRAINE WITHOUT AURA AND WITH STATUS MIGRAINOSUS: ICD-10-CM

## 2017-12-26 RX ORDER — FROVATRIPTAN SUCCINATE 2.5 MG/1
TABLET, FILM COATED ORAL
Qty: 9 TAB | Refills: 0 | Status: SHIPPED | OUTPATIENT
Start: 2017-12-26 | End: 2018-01-17 | Stop reason: SDUPTHER

## 2018-01-17 DIAGNOSIS — G43.711 INTRACTABLE CHRONIC MIGRAINE WITHOUT AURA AND WITH STATUS MIGRAINOSUS: ICD-10-CM

## 2018-01-18 RX ORDER — FROVATRIPTAN SUCCINATE 2.5 MG/1
TABLET, FILM COATED ORAL
Qty: 9 TAB | Refills: 0 | Status: SHIPPED | OUTPATIENT
Start: 2018-01-18 | End: 2018-02-06 | Stop reason: SDUPTHER

## 2018-02-06 ENCOUNTER — OFFICE VISIT (OUTPATIENT)
Dept: NEUROLOGY | Age: 43
End: 2018-02-06

## 2018-02-06 VITALS
HEART RATE: 81 BPM | SYSTOLIC BLOOD PRESSURE: 122 MMHG | DIASTOLIC BLOOD PRESSURE: 74 MMHG | HEIGHT: 65 IN | OXYGEN SATURATION: 99 % | BODY MASS INDEX: 29.99 KG/M2 | WEIGHT: 180 LBS

## 2018-02-06 DIAGNOSIS — F90.0 ATTENTION DEFICIT HYPERACTIVITY DISORDER (ADHD), PREDOMINANTLY INATTENTIVE TYPE: ICD-10-CM

## 2018-02-06 DIAGNOSIS — F41.9 ANXIETY: ICD-10-CM

## 2018-02-06 DIAGNOSIS — G43.711 INTRACTABLE CHRONIC MIGRAINE WITHOUT AURA AND WITH STATUS MIGRAINOSUS: Primary | ICD-10-CM

## 2018-02-06 RX ORDER — MELOXICAM 15 MG/1
15 TABLET ORAL DAILY
COMMUNITY

## 2018-02-06 RX ORDER — PROPRANOLOL HYDROCHLORIDE 10 MG/1
10 TABLET ORAL 3 TIMES DAILY
Qty: 270 TAB | Refills: 0 | Status: SHIPPED | OUTPATIENT
Start: 2018-02-06 | End: 2018-05-01 | Stop reason: SDUPTHER

## 2018-02-06 RX ORDER — METHYLPHENIDATE HYDROCHLORIDE 5 MG/1
5 TABLET ORAL 2 TIMES DAILY
Qty: 60 TAB | Refills: 0 | Status: SHIPPED | OUTPATIENT
Start: 2018-03-16 | End: 2018-05-01 | Stop reason: SDUPTHER

## 2018-02-06 RX ORDER — FROVATRIPTAN SUCCINATE 2.5 MG/1
TABLET, FILM COATED ORAL
Qty: 9 TAB | Refills: 11 | Status: SHIPPED | OUTPATIENT
Start: 2018-02-06 | End: 2018-08-14 | Stop reason: SDUPTHER

## 2018-02-06 RX ORDER — METHYLPHENIDATE HYDROCHLORIDE 5 MG/1
5 TABLET ORAL 2 TIMES DAILY
Qty: 60 TAB | Refills: 0 | Status: SHIPPED | OUTPATIENT
Start: 2018-04-15 | End: 2018-05-01 | Stop reason: SDUPTHER

## 2018-02-06 RX ORDER — METHYLPHENIDATE HYDROCHLORIDE 5 MG/1
5 TABLET ORAL 2 TIMES DAILY
Qty: 60 TAB | Refills: 0 | Status: SHIPPED | OUTPATIENT
Start: 2018-02-14 | End: 2018-05-01 | Stop reason: SDUPTHER

## 2018-02-06 NOTE — PROGRESS NOTES
Chief Complaint: migraines    Returns for follow up. She has been doing well. She is compliant with her medication. Had a slight increase in her headaches when switching to the long acting propranolol. She would like to go back to the short acting propranolol. Assesment and Plan  1. Intractable chronic migraine without aura and with status migrainosus  Continue propranolol ER    2. Anxiety  - venlafaxine-SR (EFFEXOR-XR) 150 mg capsule; Take 1 Cap by mouth daily (with breakfast). Dispense: 90 Cap; Refill: 3    3. Attention deficit hyperactivity disorder (ADHD), unspecified ADHD type  - methylphenidate (RITALIN) 5 mg tablet; Take 1 Tab (5 mg total) by mouth two (2) times a day. Max Daily Amount: 10 mg  Dispense: 60 Tab; Refill: 0      Allergies  Paxil [paroxetine hcl] and Erythromycin     Medications  Current Outpatient Prescriptions   Medication Sig    propranolol (INDERAL) 10 mg tablet Take 1 Tab by mouth two (2) times a day.  baclofen (LIORESAL) 10 mg tablet Take  by mouth four (4) times daily.  ibuprofen (MOTRIN) 600 mg tablet Take 1 Tab by mouth every six (6) hours as needed for Pain.  methylphenidate (RITALIN) 5 mg tablet Take 1 Tab (5 mg total) by mouth two (2) times a dayEarliest Fill Date: 4/18/17. Max Daily Amount: 10 mg    venlafaxine-SR (EFFEXOR-XR) 150 mg capsule Take 1 Cap by mouth daily (with breakfast).  frovatriptan (FROVA) 2.5 mg tablet Take 1 Tab by mouth once as needed for Migraine. Can take a second dose two hours later, max 2 daily    cetirizine (ZYRTEC) 10 mg tablet Take  by mouth daily.  ferrous gluconate (FERATE) 240 mg (27 mg iron) tablet Take 240 mg by mouth daily.  traMADol (ULTRAM-ER) 300 mg tablet Take 300 mg by mouth daily.  conjugated estrogens (PREMARIN) 1.25 mg tablet Take 1.25 mg by mouth daily.  gabapentin (NEURONTIN) 600 mg tablet Take 1 Tab by mouth three (3) times daily.  (Patient taking differently: Take 600 mg by mouth five (5) times daily.)  butalbital-acetaminophen-caffeine (FIORICET, ESGIC) -40 mg per tablet Take 1 Tab by mouth every six (6) hours as needed for Pain. Max Daily Amount: 2 Tabs. No current facility-administered medications for this visit. Medical History  Past Medical History:   Diagnosis Date    Anxiety     Arthritis     back    Asthma     has not required tx in past 10 years    Chronic pain     lumbar    Dyspepsia and other specified disorders of function of stomach     Hypertension     Migraine     Nausea & vomiting      Review of Systems   Constitutional: Negative for chills and fever. HENT: Negative for ear pain. Eyes: Negative for pain and discharge. Respiratory: Negative for cough and hemoptysis. Cardiovascular: Negative for chest pain and claudication. Gastrointestinal: Negative for constipation and diarrhea. Genitourinary: Negative for flank pain and hematuria. Musculoskeletal: Negative for back pain and myalgias. Skin: Negative for itching and rash. Neurological: Negative for tingling and tremors. Migraine headaches   Endo/Heme/Allergies: Negative for environmental allergies. Does not bruise/bleed easily. Psychiatric/Behavioral: Negative for depression and hallucinations. Exam:    Visit Vitals    /62    Pulse 77    Wt 169 lb (76.7 kg)    SpO2 98%    BMI 28.12 kg/m2      Gen Appearance: Well built no apparent distress  HEENT : Normocephalic atraumatic with anicteric sclera  Neck: Supple with no thyromegaly   Chest: Clear to auscultation, no wheezes or rubs    CardioVascular:   Regular in rhythm and rate with no murmurs  Carotids no bruit  No pedal edema    Abdomen: Soft non tender, distended with normal bowel sounds  Ext: Full range of motion  Skin: Supple, No rash    Neuro:  Awake alert and oriented to person and place and time  Recent and remote memory is intact  Speech: No aphasia and no dysarthria, intact repetition.      Cranial Nerves: II-XII intact  Eyes: Full bilateral visual fields by confrontation. PERRLA EOMI   Motor: 5/5 in all major muscle groups   No tremors  Normal tone, no cogwheel rigidity  Reflexes: 2+ over biceps, triceps and brachioradialis tendons    2+ over the patellar and achilles tendons  Sensory: Intact to all modalities in both proximal and distal distributions  Coordination: Finger to nose and heel over shin to knee intact on both sides  Gait: Well balanced with normal arm swing        Imaging    MRI Results (most recent):    Results from Hospital Encounter encounter on 04/29/11   MRI SPINE LUMBAR WITHOUT CONTRAST  **Final Report**      ICD Codes / Adm. Diagnosis: 724.2   / LBP (LOW BACK PAIN)    Examination:  MR L SPINE WO CON  - [id number] - Apr 29 2011  6:55PM  Accession No:  [id number]  Reason:  lower back pain      REPORT:  CLINICAL HISTORY: Pain    INDICATION: Lower back pain    COMPARISON: None    TECHNIQUE: MR imaging of the lumbar spine was performed with sagittal T1,   T2, STIR;  axial T1, T2. Contrast was not administered. FINDINGS:    There is normal alignment of the lumbar spine. Vertebral body heights are   maintained. Marrow signal is normal.  The conus medullaris terminates at L1.     L1/2:  The spinal canal and neuroforamina are widely patent. L2/3:  Facet arthropathy and hypertrophy. Ligamentum flavum hypertrophy. The   spinal canal and neuroforamina are widely patent. L3/4:  Facet arthropathy and hypertrophy. Ligamentum flavum hypertrophy. spinal canal and neuroforamina are widely patent> . L4/5:  Moderate central disc protrusion. Mild facet arthropathy. Mild   central canal stenosis. This desiccation and loss of disc height. Facet   arthropathy and hypertrophy. Ligamentum flavum hypertrophy. The   neuroforamina are widely patent. L5/S1:  Mild central disc protrusion. The spinal canal and neuroforamina   are widely patent.     The visualized musculature and intraperitoneal structures are within normal limits       IMPRESSION:  Multilevel degenerative changes most severe at L4-L5 where there is moderate   central disc protrusion and mild central canal stenosis.           Interpreting/Reading Doctor: Katelynn Cool ([id number])  Transcribed:  on 05/01/2011  Approved: Katelynn Cool ([id number])  05/01/2011          Distribution:  Attending Doctor: Dann Franz              Lab Review    Lab Results   Component Value Date/Time    WBC 4.6 03/15/2016 03:03 PM    HCT 40.3 03/15/2016 03:03 PM    HGB 13.0 03/15/2016 03:03 PM    PLATELET 424 99/45/9656 03:03 PM       Lab Results   Component Value Date/Time    Sodium 142 03/15/2016 03:03 PM    Potassium 3.5 03/15/2016 03:03 PM    Chloride 105 03/15/2016 03:03 PM    CO2 28 03/15/2016 03:03 PM    Glucose 121 03/15/2016 03:03 PM    BUN 6 03/15/2016 03:03 PM    Creatinine 0.70 03/15/2016 03:03 PM    Calcium 8.8 03/15/2016 03:03 PM

## 2018-02-06 NOTE — PATIENT INSTRUCTIONS
10 [facility]   Statement to Patients  April 1, 2014      In an effort to ensure the large volume of patient prescription refills is processed in the most efficient and expeditious manner, we are asking our patients to assist us by calling your Pharmacy for all prescription refills, this will include also your  Mail Order Pharmacy. The pharmacy will contact our office electronically to continue the refill process. Please do not wait until the last minute to call your pharmacy. We need at least 48 hours (2days) to fill prescriptions. We also encourage you to call your pharmacy before going to  your prescription to make sure it is ready. With regard to controlled substance prescription refill requests (narcotic refills) that need to be picked up at our office, we ask your cooperation by providing us with at least 72 hours (3days) notice that you will need a refill. We will not refill narcotic prescription refill requests after 4:00pm on any weekday, Monday through Thursday, or after 2:00pm on Fridays, or on the weekends. We encourage everyone to explore another way of getting your prescription refill request processed using [employer], our patient web portal through our electronic medical record system. [employer] is an efficient and effective way to communicate your medication request directly to the office and  downloadable as an feng on your smart phone . [employer] also features a review functionality that allows you to view your medication list as well as leave messages for your physician. Are you ready to get connected? If so please review the attatched instructions or speak to any of our staff to get you set up right away! Thank you so much for your cooperation. Should you have any questions please contact our Practice Administrator.     The Physicians and Staff,  [facility]     Please be advised there is a $25 fee for all paperwork to be completed from our  providers. This is to be paid by the patient prior to picking up the completed forms. A Healthy Lifestyle: Care Instructions  Your Care Instructions    A healthy lifestyle can help you feel good, stay at a healthy weight, and have plenty of energy for both work and play. A healthy lifestyle is something you can share with your whole family. A healthy lifestyle also can lower your risk for serious health problems, such as high blood pressure, heart disease, and diabetes. You can follow a few steps listed below to improve your health and the health of your family. Follow-up care is a key part of your treatment and safety. Be sure to make and go to all appointments, and call your doctor if you are having problems. It's also a good idea to know your test results and keep a list of the medicines you take. How can you care for yourself at home? · Do not eat too much sugar, fat, or fast foods. You can still have dessert and treats now and then. The goal is moderation. · Start small to improve your eating habits. Pay attention to portion sizes, drink less juice and soda pop, and eat more fruits and vegetables. ¨ Eat a healthy amount of food. A 3-ounce serving of meat, for example, is about the size of a deck of cards. Fill the rest of your plate with vegetables and whole grains. ¨ Limit the amount of soda and sports drinks you have every day. Drink more water when you are thirsty. ¨ Eat at least 5 servings of fruits and vegetables every day. It may seem like a lot, but it is not hard to reach this goal. A serving or helping is 1 piece of fruit, 1 cup of vegetables, or 2 cups of leafy, raw vegetables. Have an apple or some carrot sticks as an afternoon snack instead of a candy bar. Try to have fruits and/or vegetables at every meal.  · Make exercise part of your daily routine. You may want to start with simple activities, such as walking, bicycling, or slow swimming.  Try to be active 30 to 60 minutes every day. You do not need to do all 30 to 60 minutes all at once. For example, you can exercise 3 times a day for 10 or 20 minutes. Moderate exercise is safe for most people, but it is always a good idea to talk to your doctor before starting an exercise program.  · Keep moving. Yoana Moulding the lawn, work in the garden, or [employer] Take the stairs instead of the elevator at work. · If you smoke, quit. People who smoke have an increased risk for heart attack, stroke, cancer, and other lung illnesses. Quitting is hard, but there are ways to boost your chance of quitting tobacco for good. ¨ Use nicotine gum, patches, or lozenges. ¨ Ask your doctor about stop-smoking programs and medicines. ¨ Keep trying. In addition to reducing your risk of diseases in the future, you will notice some benefits soon after you stop using tobacco. If you have shortness of breath or asthma symptoms, they will likely get better within a few weeks after you quit. · Limit how much alcohol you drink. Moderate amounts of alcohol (up to 2 drinks a day for men, 1 drink a day for women) are okay. But drinking too much can lead to liver problems, high blood pressure, and other health problems. Family health  If you have a family, there are many things you can do together to improve your health. · Eat meals together as a family as often as possible. · Eat healthy foods. This includes fruits, vegetables, lean meats and dairy, and whole grains. · Include your family in your fitness plan. Most people think of activities such as jogging or tennis as the way to fitness, but there are many ways you and your family can be more active. Anything that makes you breathe hard and gets your heart pumping is exercise. Here are some tips:  ¨ Walk to do errands or to take your child to school or the bus. ¨ Go for a family bike ride after dinner instead of watching TV. Where can you learn more?   Go to http://yoly-chelsea.info/. Enter K345 in the search box to learn more about \"A Healthy Lifestyle: Care Instructions. \"  Current as of: May 12, 2017  Content Version: 11.4  © 8329-1734 Healthwise, [employer]. Care instructions adapted under license by [employer] (which disclaims liability or warranty for this information). If you have questions about a medical condition or this instruction, always ask your healthcare professional. [address] any warranty or liability for your use of this information.

## 2018-02-06 NOTE — MR AVS SNAPSHOT
[address], 
[address], [address] [facility] 
[phone] Patient: Ricki Preston MRN: [id number] NPF:[id number] Visit Information Date & Time Provider Department Dept. Phone Encounter #  
 2/6/2018  8:00 AM Renzo Reynolds MD [facility] [phone] [id number] Follow-up Instructions Return in about 3 months (around 5/6/2018) for MIGRAINE. Follow-up and Disposition History Your Appointments 5/1/2018 10:00 AM  
Follow Up with Renzo Reynolds MD  
[facility] [address]) Appt Note: Follow up [address], 
[address] [facility], [address] [id number]  
[address] , [address] [place] [address], [address] [place] [address] St [address] [phone] Upcoming Health Maintenance Date Due DTaP/Tdap/Td series (1 - Tdap) 9/2/1998 PAP AKA CERVICAL CYTOLOGY 9/2/1998 Allergies as of 2/6/2018  Review Complete On: 2/6/2018 By: Renzo Reynolds MD  
  
 Severity Noted Reaction Type Reactions Paxil [Paroxetine Hcl] High 05/17/2017    Anaphylaxis Erythromycin  12/17/2010    Nausea and Vomiting Current Immunizations  Reviewed on 12/19/2012 Name Date Influenza Vaccine 9/28/2017 Zoster Vaccine, Live 9/28/2017 Not reviewed this visit You Were Diagnosed With   
  
 Codes Comments Intractable chronic migraine without aura and with status migrainosus    -  Primary ICD-10-CM: Z71.899 ICD-9-CM: 346.73 Anxiety     ICD-10-CM: F41.9 ICD-9-CM: 300.00 Attention deficit hyperactivity disorder (ADHD), predominantly inattentive type     ICD-10-CM: F90.0 ICD-9-CM: 314.00 Vitals BP Pulse Height(growth percentile) Weight(growth percentile) SpO2 BMI  
 122/74 81 5' 5\" (1.651 m) 180 lb (81.6 kg) 99% 29.95 kg/m2 OB Status Smoking Status Hysterectomy Former Smoker BMI and BSA Data Body Mass Index Body Surface Area  
 29.95 kg/m 2 1.93 m 2 Preferred Pharmacy Pharmacy Name Phone Magdi Ireland [address]. [phone] Your Updated Medication List  
  
   
This list is accurate as of: 2/6/18  8:46 AM.  Always use your most recent med list.  
  
  
  
  
 baclofen 10 mg tablet Commonly known as:  LIORESAL Take  by mouth four (4) times daily. butalbital-acetaminophen-caffeine -40 mg per tablet Commonly known as:  Karlene Lathe Take 1 Tab by mouth every six (6) hours as needed for Pain. Max Daily Amount: 2 Tabs. cetirizine 10 mg tablet Commonly known as:  ZYRTEC Take  by mouth daily. FERATE 240 mg (27 mg iron) tablet Generic drug:  ferrous gluconate Take 240 mg by mouth daily. * frovatriptan 2.5 mg tablet Commonly known as:  Vanice Santa Cruz Take 1 Tab by mouth once as needed for Migraine. Can take a second dose two hours later, max 2 daily * frovatriptan 2.5 mg tablet Commonly known as:  Vanice Santa Cruz TAKE ONE TABLET BY MOUTH FOR ONE DOSE AS NEEDED FOR MIGRAINE  
  
 gabapentin 600 mg tablet Commonly known as:  NEURONTIN Take 1 Tab by mouth three (3) times daily. ibuprofen 600 mg tablet Commonly known as:  MOTRIN Take 1 Tab by mouth every six (6) hours as needed for Pain.  
  
 meloxicam 15 mg tablet Commonly known as:  MOBIC Take 15 mg by mouth daily. * methylphenidate HCl 5 mg tablet Commonly known as:  RITALIN Take 1 Tab (5 mg total) by mouth two (2) times a day. Max Daily Amount: 10 mg  
  
 * methylphenidate HCl 5 mg tablet Commonly known as:  RITALIN Take 1 Tab (5 mg total) by mouth two (2) times a dayEarliest Fill Date: 9/17/17. Max Daily Amount: 10 mg  
  
 * methylphenidate HCl 5 mg tablet Commonly known as:  RITALIN Take 1 Tab (5 mg total) by mouth two (2) times a dayEarliest Fill Date: 10/17/17. Max Daily Amount: 10 mg  
  
 * methylphenidate HCl 5 mg tablet Commonly known as:  RITALIN Take 1 Tab (5 mg total) by mouth two (2) times a dayEarliest Fill Date: 2/14/18. Max Daily Amount: 10 mg  
Start taking on:  2/14/2018 * methylphenidate HCl 5 mg tablet Commonly known as:  RITALIN Take 1 Tab (5 mg total) by mouth two (2) times a dayEarliest Fill Date: 3/16/18. Max Daily Amount: 10 mg  
Start taking on:  3/16/2018 * methylphenidate HCl 5 mg tablet Commonly known as:  RITALIN Take 1 Tab (5 mg total) by mouth two (2) times a dayEarliest Fill Date: 4/15/18. Max Daily Amount: 10 mg  
Start taking on:  4/15/2018 PREMARIN 1.25 mg tablet Generic drug:  conjugated estrogens Take 1.25 mg by mouth daily. * propranolol LA 60 mg SR capsule Commonly known as:  INDERAL LA Take 1 Cap by mouth daily. * propranolol 10 mg tablet Commonly known as:  INDERAL Take 1 Tab by mouth three (3) times daily. venlafaxine- mg capsule Commonly known as:  EFFEXOR-XR Take 1 Cap by mouth daily (with breakfast). * Notice: This list has 10 medication(s) that are the same as other medications prescribed for you. Read the directions carefully, and ask your doctor or other care provider to review them with you. Prescriptions Printed Refills  
 methylphenidate HCl (RITALIN) 5 mg tablet 0 Starting on: 4/15/2018 Sig: Take 1 Tab (5 mg total) by mouth two (2) times a dayEarliest Fill Date: 4/15/18. Max Daily Amount: 10 mg  
 Class: Print Route: Oral  
 methylphenidate HCl (RITALIN) 5 mg tablet 0 Starting on: 3/16/2018 Sig: Take 1 Tab (5 mg total) by mouth two (2) times a dayEarliest Fill Date: 3/16/18. Max Daily Amount: 10 mg  
 Class: Print Route: Oral  
 methylphenidate HCl (RITALIN) 5 mg tablet 0 Starting on: 2/14/2018  Sig: Take 1 Tab (5 mg total) by mouth two (2) times a dayEarliest Fill Date: 2/14/18. Max Daily Amount: 10 mg  
 Class: Print Route: Oral  
  
Prescriptions Sent to Pharmacy Refills  
 frovatriptan (FROVA) 2.5 mg tablet 11 Sig: TAKE ONE TABLET BY MOUTH FOR ONE DOSE AS NEEDED FOR MIGRAINE Class: Normal  
 Pharmacy: [facility] [address], [address] [place] [address] RD. Ph #: [phone]  
 propranolol (INDERAL) 10 mg tablet 0 Sig: Take 1 Tab by mouth three (3) times daily. Class: Normal  
 Pharmacy: [facility] Ph #: [phone] Route: Oral  
  
Follow-up Instructions Return in about 3 months (around 5/6/2018) for MIGRAINE. Patient Instructions PRESCRIPTION REFILL POLICY [facility] Statement to Patients April 1, 2014 In an effort to ensure the large volume of patient prescription refills is processed in the most efficient and expeditious manner, we are asking our patients to assist us by calling your Pharmacy for all prescription refills, this will include also your  Mail Order Pharmacy. The pharmacy will contact our office electronically to continue the refill process. Please do not wait until the last minute to call your pharmacy. We need at least 48 hours (2days) to fill prescriptions. We also encourage you to call your pharmacy before going to  your prescription to make sure it is ready. With regard to controlled substance prescription refill requests (narcotic refills) that need to be picked up at our office, we ask your cooperation by providing us with at least 72 hours (3days) notice that you will need a refill. We will not refill narcotic prescription refill requests after 4:00pm on any weekday, Monday through Thursday, or after 2:00pm on Fridays, or on the weekends.   
  
We encourage everyone to explore another way of getting your prescription refill request processed using [employer], our patient web portal through our electronic medical record system. [employer] is an efficient and effective way to communicate your medication request directly to the office and  downloadable as an feng on your smart phone . [employer] also features a review functionality that allows you to view your medication list as well as leave messages for your physician. Are you ready to get connected? If so please review the attatched instructions or speak to any of our staff to get you set up right away! Thank you so much for your cooperation. Should you have any questions please contact our Practice Administrator. The Physicians and Staff,  [facility] Please be advised there is a $25 fee for all paperwork to be completed from our  providers. This is to be paid by the patient prior to picking up the completed forms. A Healthy Lifestyle: Care Instructions Your Care Instructions A healthy lifestyle can help you feel good, stay at a healthy weight, and have plenty of energy for both work and play. A healthy lifestyle is something you can share with your whole family. A healthy lifestyle also can lower your risk for serious health problems, such as high blood pressure, heart disease, and diabetes. You can follow a few steps listed below to improve your health and the health of your family. Follow-up care is a key part of your treatment and safety. Be sure to make and go to all appointments, and call your doctor if you are having problems. It's also a good idea to know your test results and keep a list of the medicines you take. How can you care for yourself at home? · Do not eat too much sugar, fat, or fast foods. You can still have dessert and treats now and then. The goal is moderation. · Start small to improve your eating habits. Pay attention to portion sizes, drink less juice and soda pop, and eat more fruits and vegetables. ¨ Eat a healthy amount of food.  A 3-ounce serving of meat, for example, is about the size of a deck of cards. Fill the rest of your plate with vegetables and whole grains. ¨ Limit the amount of soda and sports drinks you have every day. Drink more water when you are thirsty. ¨ Eat at least 5 servings of fruits and vegetables every day. It may seem like a lot, but it is not hard to reach this goal. A serving or helping is 1 piece of fruit, 1 cup of vegetables, or 2 cups of leafy, raw vegetables. Have an apple or some carrot sticks as an afternoon snack instead of a candy bar. Try to have fruits and/or vegetables at every meal. 
· Make exercise part of your daily routine. You may want to start with simple activities, such as walking, bicycling, or slow swimming. Try to be active 30 to 60 minutes every day. You do not need to do all 30 to 60 minutes all at once. For example, you can exercise 3 times a day for 10 or 20 minutes. Moderate exercise is safe for most people, but it is always a good idea to talk to your doctor before starting an exercise program. 
· Keep moving. Haritha Castanedaor the lawn, work in the garden, or [employer]. Take the stairs instead of the elevator at work. · If you smoke, quit. People who smoke have an increased risk for heart attack, stroke, cancer, and other lung illnesses. Quitting is hard, but there are ways to boost your chance of quitting tobacco for good. ¨ Use nicotine gum, patches, or lozenges. ¨ Ask your doctor about stop-smoking programs and medicines. ¨ Keep trying. In addition to reducing your risk of diseases in the future, you will notice some benefits soon after you stop using tobacco. If you have shortness of breath or asthma symptoms, they will likely get better within a few weeks after you quit. · Limit how much alcohol you drink. Moderate amounts of alcohol (up to 2 drinks a day for men, 1 drink a day for women) are okay. But drinking too much can lead to liver problems, high blood pressure, and other health problems. Family health If you have a family, there are many things you can do together to improve your health. · Eat meals together as a family as often as possible. · Eat healthy foods. This includes fruits, vegetables, lean meats and dairy, and whole grains. · Include your family in your fitness plan. Most people think of activities such as jogging or tennis as the way to fitness, but there are many ways you and your family can be more active. Anything that makes you breathe hard and gets your heart pumping is exercise. Here are some tips: 
¨ Walk to do errands or to take your child to school or the bus. ¨ Go for a family bike ride after dinner instead of watching TV. Where can you learn more? Go to http://yoly-chelsea.info/. Enter J055 in the search box to learn more about \"A Healthy Lifestyle: Care Instructions. \" Current as of: May 12, 2017 Content Version: 11.4 © 3594-8228 [employer]. Care instructions adapted under license by [employer] (which disclaims liability or warranty for this information). If you have questions about a medical condition or this instruction, always ask your healthcare professional. [place] [address] any warranty or liability for your use of this information. Patient Instructions History Introducing [facility]! Dear Roshan Magana: Thank you for requesting a [employer] account. Our records indicate that you already have an active [employer] account. You can access your account anytime at [email or web address] Did you know that you can access your hospital and ER discharge instructions at any time in [employer]? You can also review all of your test results from your hospital stay or ER visit. Additional Information If you have questions, please visit the Frequently Asked Questions section of the [employer] website at [email or web address] Remember, MyChart is NOT to be used for urgent needs. For medical emergencies, dial 911. Now available from your iPhone and Android! Please provide this summary of care documentation to your next provider. Your primary care clinician is listed as TYREE Steele. If you have any questions after today's visit, please call [phone].

## 2018-05-01 ENCOUNTER — OFFICE VISIT (OUTPATIENT)
Dept: NEUROLOGY | Age: 43
End: 2018-05-01

## 2018-05-01 VITALS
OXYGEN SATURATION: 98 % | SYSTOLIC BLOOD PRESSURE: 122 MMHG | HEIGHT: 65 IN | DIASTOLIC BLOOD PRESSURE: 84 MMHG | BODY MASS INDEX: 30.66 KG/M2 | WEIGHT: 184 LBS | HEART RATE: 84 BPM

## 2018-05-01 DIAGNOSIS — F41.9 ANXIETY: ICD-10-CM

## 2018-05-01 DIAGNOSIS — G43.711 INTRACTABLE CHRONIC MIGRAINE WITHOUT AURA AND WITH STATUS MIGRAINOSUS: ICD-10-CM

## 2018-05-01 DIAGNOSIS — I10 ESSENTIAL HYPERTENSION: ICD-10-CM

## 2018-05-01 DIAGNOSIS — G43.701 CHRONIC MIGRAINE WITHOUT AURA WITH STATUS MIGRAINOSUS, NOT INTRACTABLE: Primary | ICD-10-CM

## 2018-05-01 DIAGNOSIS — F90.0 ATTENTION DEFICIT HYPERACTIVITY DISORDER (ADHD), PREDOMINANTLY INATTENTIVE TYPE: ICD-10-CM

## 2018-05-01 RX ORDER — METHYLPHENIDATE HYDROCHLORIDE 5 MG/1
5 TABLET ORAL 2 TIMES DAILY
Qty: 60 TAB | Refills: 0 | Status: SHIPPED | OUTPATIENT
Start: 2018-06-29 | End: 2018-08-14 | Stop reason: SDUPTHER

## 2018-05-01 RX ORDER — METHYLPHENIDATE HYDROCHLORIDE 5 MG/1
5 TABLET ORAL 2 TIMES DAILY
Qty: 60 TAB | Refills: 0 | Status: SHIPPED | OUTPATIENT
Start: 2018-05-01 | End: 2018-08-14 | Stop reason: SDUPTHER

## 2018-05-01 RX ORDER — METHYLPHENIDATE HYDROCHLORIDE 5 MG/1
5 TABLET ORAL 2 TIMES DAILY
Qty: 60 TAB | Refills: 0 | Status: SHIPPED | OUTPATIENT
Start: 2018-05-31 | End: 2018-08-14 | Stop reason: SDUPTHER

## 2018-05-01 RX ORDER — PROPRANOLOL HYDROCHLORIDE 10 MG/1
10 TABLET ORAL 3 TIMES DAILY
Qty: 270 TAB | Refills: 2 | Status: SHIPPED | OUTPATIENT
Start: 2018-05-01 | End: 2018-08-14 | Stop reason: SDUPTHER

## 2018-05-01 NOTE — PROGRESS NOTES
Chief Complaint: migraines    Returns for follow up. She has been doing well. She is compliant with her medication. No new health issues. No side effects     Assesment and Plan  1. Intractable chronic migraine without aura and with status migrainosus  Continue propranolol short acting    2. Anxiety  - venlafaxine-SR (EFFEXOR-XR) 150 mg capsule; Take 1 Cap by mouth daily (with breakfast). Dispense: 90 Cap; Refill: 3    3. Attention deficit hyperactivity disorder (ADHD), unspecified ADHD type  - methylphenidate (RITALIN) 5 mg tablet; Take 1 Tab (5 mg total) by mouth two (2) times a day. Max Daily Amount: 10 mg  Dispense: 60 Tab; Refill: 0      Allergies  Paxil [paroxetine hcl] and Erythromycin     Medications  Current Outpatient Prescriptions   Medication Sig    propranolol (INDERAL) 10 mg tablet Take 1 Tab by mouth two (2) times a day.  baclofen (LIORESAL) 10 mg tablet Take  by mouth four (4) times daily.  ibuprofen (MOTRIN) 600 mg tablet Take 1 Tab by mouth every six (6) hours as needed for Pain.  methylphenidate (RITALIN) 5 mg tablet Take 1 Tab (5 mg total) by mouth two (2) times a dayEarliest Fill Date: 4/18/17. Max Daily Amount: 10 mg    venlafaxine-SR (EFFEXOR-XR) 150 mg capsule Take 1 Cap by mouth daily (with breakfast).  frovatriptan (FROVA) 2.5 mg tablet Take 1 Tab by mouth once as needed for Migraine. Can take a second dose two hours later, max 2 daily    cetirizine (ZYRTEC) 10 mg tablet Take  by mouth daily.  ferrous gluconate (FERATE) 240 mg (27 mg iron) tablet Take 240 mg by mouth daily.  traMADol (ULTRAM-ER) 300 mg tablet Take 300 mg by mouth daily.  conjugated estrogens (PREMARIN) 1.25 mg tablet Take 1.25 mg by mouth daily.  gabapentin (NEURONTIN) 600 mg tablet Take 1 Tab by mouth three (3) times daily.  (Patient taking differently: Take 600 mg by mouth five (5) times daily.)    butalbital-acetaminophen-caffeine (FIORICET, ESGIC) -40 mg per tablet Take 1 Tab by mouth every six (6) hours as needed for Pain. Max Daily Amount: 2 Tabs. No current facility-administered medications for this visit. Medical History  Past Medical History:   Diagnosis Date    Anxiety     Arthritis     back    Asthma     has not required tx in past 10 years    Chronic pain     lumbar    Dyspepsia and other specified disorders of function of stomach     Hypertension     Migraine     Nausea & vomiting      Review of Systems   Constitutional: Negative for chills and fever. HENT: Negative for ear pain. Eyes: Negative for pain and discharge. Respiratory: Negative for cough and hemoptysis. Cardiovascular: Negative for chest pain and claudication. Gastrointestinal: Negative for constipation and diarrhea. Genitourinary: Negative for flank pain and hematuria. Musculoskeletal: Negative for back pain and myalgias. Skin: Negative for itching and rash. Neurological: Negative for tingling and tremors. Migraine headaches   Endo/Heme/Allergies: Negative for environmental allergies. Does not bruise/bleed easily. Psychiatric/Behavioral: Negative for depression and hallucinations. Exam:    Visit Vitals    /62    Pulse 77    Wt 169 lb (76.7 kg)    SpO2 98%    BMI 28.12 kg/m2      Gen Appearance: Well built no apparent distress  HEENT : Normocephalic atraumatic with anicteric sclera  Neck: Supple with no thyromegaly   Chest: Clear to auscultation, no wheezes or rubs    CardioVascular:   Regular in rhythm and rate with no murmurs  Carotids no bruit  No pedal edema    Abdomen: Soft non tender, distended with normal bowel sounds  Ext: Full range of motion  Skin: Supple, No rash    Neuro:  Awake alert and oriented to person and place and time  Recent and remote memory is intact  Speech: No aphasia and no dysarthria, intact repetition. Cranial Nerves: II-XII intact  Eyes: Full bilateral visual fields by confrontation.  PERRLA EOMI   Motor: 5/5 in all major muscle groups   No tremors  Normal tone, no cogwheel rigidity  Reflexes: 2+ over biceps, triceps and brachioradialis tendons    2+ over the patellar and achilles tendons  Sensory: Intact to all modalities in both proximal and distal distributions  Coordination: Finger to nose and heel over shin to knee intact on both sides  Gait: Well balanced with normal arm swing        Imaging    MRI Results (most recent):    Results from Hospital Encounter encounter on 04/29/11   MRI SPINE LUMBAR WITHOUT CONTRAST  **Final Report**      ICD Codes / Adm. Diagnosis: 724.2   / LBP (LOW BACK PAIN)    Examination:  MR L SPINE WO CON  - [id number] - Apr 29 2011  6:55PM  Accession No:  [id number]  Reason:  lower back pain      REPORT:  CLINICAL HISTORY: Pain    INDICATION: Lower back pain    COMPARISON: None    TECHNIQUE: MR imaging of the lumbar spine was performed with sagittal T1,   T2, STIR;  axial T1, T2. Contrast was not administered. FINDINGS:    There is normal alignment of the lumbar spine. Vertebral body heights are   maintained. Marrow signal is normal.  The conus medullaris terminates at L1.     L1/2:  The spinal canal and neuroforamina are widely patent. L2/3:  Facet arthropathy and hypertrophy. Ligamentum flavum hypertrophy. The   spinal canal and neuroforamina are widely patent. L3/4:  Facet arthropathy and hypertrophy. Ligamentum flavum hypertrophy. spinal canal and neuroforamina are widely patent> . L4/5:  Moderate central disc protrusion. Mild facet arthropathy. Mild   central canal stenosis. This desiccation and loss of disc height. Facet   arthropathy and hypertrophy. Ligamentum flavum hypertrophy. The   neuroforamina are widely patent. L5/S1:  Mild central disc protrusion. The spinal canal and neuroforamina   are widely patent.     The visualized musculature and intraperitoneal structures are within normal   limits       IMPRESSION:  Multilevel degenerative changes most severe at L4-L5 where there is moderate   central disc protrusion and mild central canal stenosis.           Interpreting/Reading Doctor: Andrea Salter ([id number])  Transcribed:  on 05/01/2011  Approved: Andrea Salter ([id number])  05/01/2011          Distribution:  Attending Doctor: Sabrina Lozada              Lab Review    Lab Results   Component Value Date/Time    WBC 4.6 03/15/2016 03:03 PM    HCT 40.3 03/15/2016 03:03 PM    HGB 13.0 03/15/2016 03:03 PM    PLATELET 778 73/53/7648 03:03 PM       Lab Results   Component Value Date/Time    Sodium 142 03/15/2016 03:03 PM    Potassium 3.5 03/15/2016 03:03 PM    Chloride 105 03/15/2016 03:03 PM    CO2 28 03/15/2016 03:03 PM    Glucose 121 03/15/2016 03:03 PM    BUN 6 03/15/2016 03:03 PM    Creatinine 0.70 03/15/2016 03:03 PM    Calcium 8.8 03/15/2016 03:03 PM

## 2018-05-01 NOTE — PATIENT INSTRUCTIONS
[facility]   Statement to Patients  April 1, 2014      In an effort to ensure the large volume of patient prescription refills is processed in the most efficient and expeditious manner, we are asking our patients to assist us by calling your Pharmacy for all prescription refills, this will include also your  Mail Order Pharmacy. The pharmacy will contact our office electronically to continue the refill process. Please do not wait until the last minute to call your pharmacy. We need at least 48 hours (2days) to fill prescriptions. We also encourage you to call your pharmacy before going to  your prescription to make sure it is ready. With regard to controlled substance prescription refill requests (narcotic refills) that need to be picked up at our office, we ask your cooperation by providing us with at least 72 hours (3days) notice that you will need a refill. We will not refill narcotic prescription refill requests after 4:00pm on any weekday, Monday through Thursday, or after 2:00pm on Fridays, or on the weekends. We encourage everyone to explore another way of getting your prescription refill request processed using [employer], our patient web portal through our electronic medical record system. [employer] is an efficient and effective way to communicate your medication request directly to the office and  downloadable as an feng on your smart phone . [employer] also features a review functionality that allows you to view your medication list as well as leave messages for your physician. Are you ready to get connected? If so please review the attatched instructions or speak to any of our staff to get you set up right away! Thank you so much for your cooperation. Should you have any questions please contact our Practice Administrator.     The Physicians and Staff,  [facility]     Please be advised there is a $25 fee for all paperwork to be completed from our  providers. This is to be paid by the patient prior to picking up the completed forms. A Healthy Lifestyle: Care Instructions  Your Care Instructions    A healthy lifestyle can help you feel good, stay at a healthy weight, and have plenty of energy for both work and play. A healthy lifestyle is something you can share with your whole family. A healthy lifestyle also can lower your risk for serious health problems, such as high blood pressure, heart disease, and diabetes. You can follow a few steps listed below to improve your health and the health of your family. Follow-up care is a key part of your treatment and safety. Be sure to make and go to all appointments, and call your doctor if you are having problems. It's also a good idea to know your test results and keep a list of the medicines you take. How can you care for yourself at home? · Do not eat too much sugar, fat, or fast foods. You can still have dessert and treats now and then. The goal is moderation. · Start small to improve your eating habits. Pay attention to portion sizes, drink less juice and soda pop, and eat more fruits and vegetables. ¨ Eat a healthy amount of food. A 3-ounce serving of meat, for example, is about the size of a deck of cards. Fill the rest of your plate with vegetables and whole grains. ¨ Limit the amount of soda and sports drinks you have every day. Drink more water when you are thirsty. ¨ Eat at least 5 servings of fruits and vegetables every day. It may seem like a lot, but it is not hard to reach this goal. A serving or helping is 1 piece of fruit, 1 cup of vegetables, or 2 cups of leafy, raw vegetables. Have an apple or some carrot sticks as an afternoon snack instead of a candy bar. Try to have fruits and/or vegetables at every meal.  · Make exercise part of your daily routine. You may want to start with simple activities, such as walking, bicycling, or slow swimming.  Try to be active 30 to 60 minutes every day. You do not need to do all 30 to 60 minutes all at once. For example, you can exercise 3 times a day for 10 or 20 minutes. Moderate exercise is safe for most people, but it is always a good idea to talk to your doctor before starting an exercise program.  · Keep moving. Lupe Valero the lawn, work in the garden, or [place]. Take the stairs instead of the elevator at work. · If you smoke, quit. People who smoke have an increased risk for heart attack, stroke, cancer, and other lung illnesses. Quitting is hard, but there are ways to boost your chance of quitting tobacco for good. ¨ Use nicotine gum, patches, or lozenges. ¨ Ask your doctor about stop-smoking programs and medicines. ¨ Keep trying. In addition to reducing your risk of diseases in the future, you will notice some benefits soon after you stop using tobacco. If you have shortness of breath or asthma symptoms, they will likely get better within a few weeks after you quit. · Limit how much alcohol you drink. Moderate amounts of alcohol (up to 2 drinks a day for men, 1 drink a day for women) are okay. But drinking too much can lead to liver problems, high blood pressure, and other health problems. Family health  If you have a family, there are many things you can do together to improve your health. · Eat meals together as a family as often as possible. · Eat healthy foods. This includes fruits, vegetables, lean meats and dairy, and whole grains. · Include your family in your fitness plan. Most people think of activities such as jogging or tennis as the way to fitness, but there are many ways you and your family can be more active. Anything that makes you breathe hard and gets your heart pumping is exercise. Here are some tips:  ¨ Walk to do errands or to take your child to school or the bus. ¨ Go for a family bike ride after dinner instead of watching TV. Where can you learn more?   Go to http://yoly-chelsea.info/. Enter E269 in the search box to learn more about \"A Healthy Lifestyle: Care Instructions. \"  Current as of: May 12, 2017  Content Version: 11.4  © 1285-5863 [employer], [employer]. Care instructions adapted under license by [employer] (which disclaims liability or warranty for this information). If you have questions about a medical condition or this instruction, always ask your healthcare professional. [place] [address] any warranty or liability for your use of this information.

## 2018-05-01 NOTE — MR AVS SNAPSHOT
[address] [facility], 
[address], [address] [facility] 
[phone] Patient: Chad Priest MRN: [id number] OER:[id number] Visit Information Date & Time Provider Department Dept. Phone Encounter #  
 5/1/2018 10:00 AM Jose Carlos Cortez MD [facility] [phone] [id number] Follow-up Instructions Return in about 3 months (around 8/1/2018) for MIGRAINE. Upcoming Health Maintenance Date Due DTaP/Tdap/Td series (1 - Tdap) 9/2/1998 PAP AKA CERVICAL CYTOLOGY 9/2/1998 Influenza Age 5 to Adult 8/1/2018 Allergies as of 5/1/2018  Review Complete On: 5/1/2018 By: Jose Carlos Cortez MD  
  
 Severity Noted Reaction Type Reactions Paxil [Paroxetine Hcl] High 05/17/2017    Anaphylaxis Erythromycin  12/17/2010    Nausea and Vomiting Current Immunizations  Reviewed on 12/19/2012 Name Date Influenza Vaccine 9/28/2017 Zoster Vaccine, Live 9/28/2017 Not reviewed this visit You Were Diagnosed With   
  
 Codes Comments Chronic migraine without aura with status migrainosus, not intractable    -  Primary ICD-10-CM: J67.339 ICD-9-CM: 346.72 Anxiety     ICD-10-CM: F41.9 ICD-9-CM: 300.00 Essential hypertension     ICD-10-CM: I10 
ICD-9-CM: 401.9 Attention deficit hyperactivity disorder (ADHD), predominantly inattentive type     ICD-10-CM: F90.0 ICD-9-CM: 314.00 Intractable chronic migraine without aura and with status migrainosus     ICD-10-CM: R01.350 ICD-9-CM: 346.73 Vitals BP Pulse Height(growth percentile) Weight(growth percentile) SpO2 BMI  
 122/84 84 5' 5\" (1.651 m) 184 lb (83.5 kg) 98% 30.62 kg/m2 OB Status Smoking Status Hysterectomy Former Smoker Vitals History BMI and BSA Data Body Mass Index Body Surface Area  
 30.62 kg/m 2 1.96 m 2 Preferred Pharmacy Pharmacy Name Phone [address] [place], [address] [place] [address]. [phone] Your Updated Medication List  
  
   
This list is accurate as of 5/1/18 10:36 AM.  Always use your most recent med list.  
  
  
  
  
 baclofen 10 mg tablet Commonly known as:  LIORESAL Take  by mouth four (4) times daily. butalbital-acetaminophen-caffeine -40 mg per tablet Commonly known as:  Samm Click Take 1 Tab by mouth every six (6) hours as needed for Pain. Max Daily Amount: 2 Tabs. cetirizine 10 mg tablet Commonly known as:  ZYRTEC Take  by mouth daily. FERATE 240 mg (27 mg iron) tablet Generic drug:  ferrous gluconate Take 240 mg by mouth daily. * frovatriptan 2.5 mg tablet Commonly known as:  Sherley Diallo Take 1 Tab by mouth once as needed for Migraine. Can take a second dose two hours later, max 2 daily * frovatriptan 2.5 mg tablet Commonly known as:  Sherley Diallo TAKE ONE TABLET BY MOUTH FOR ONE DOSE AS NEEDED FOR MIGRAINE  
  
 gabapentin 600 mg tablet Commonly known as:  NEURONTIN Take 1 Tab by mouth three (3) times daily. ibuprofen 600 mg tablet Commonly known as:  MOTRIN Take 1 Tab by mouth every six (6) hours as needed for Pain.  
  
 meloxicam 15 mg tablet Commonly known as:  MOBIC Take 15 mg by mouth daily. * methylphenidate HCl 5 mg tablet Commonly known as:  RITALIN Take 1 Tab (5 mg total) by mouth two (2) times a day. Max Daily Amount: 10 mg  
  
 * methylphenidate HCl 5 mg tablet Commonly known as:  RITALIN Take 1 Tab (5 mg total) by mouth two (2) times a dayEarliest Fill Date: 9/17/17. Max Daily Amount: 10 mg  
  
 * methylphenidate HCl 5 mg tablet Commonly known as:  RITALIN Take 1 Tab (5 mg total) by mouth two (2) times a dayEarliest Fill Date: 10/17/17. Max Daily Amount: 10 mg  
  
 * methylphenidate HCl 5 mg tablet Commonly known as:  RITALIN Take 1 Tab (5 mg total) by mouth two (2) times a day.   Max Daily Amount: 10 mg * methylphenidate HCl 5 mg tablet Commonly known as:  RITALIN Take 1 Tab (5 mg total) by mouth two (2) times a dayEarliest Fill Date: 5/31/18. Max Daily Amount: 10 mg  
Start taking on:  5/31/2018 * methylphenidate HCl 5 mg tablet Commonly known as:  RITALIN Take 1 Tab (5 mg total) by mouth two (2) times a dayEarliest Fill Date: 6/29/18. Max Daily Amount: 10 mg  
Start taking on:  6/29/2018 PREMARIN 1.25 mg tablet Generic drug:  conjugated estrogens Take 1.25 mg by mouth daily. * propranolol LA 60 mg SR capsule Commonly known as:  INDERAL LA Take 1 Cap by mouth daily. * propranolol 10 mg tablet Commonly known as:  INDERAL Take 1 Tab by mouth three (3) times daily. venlafaxine- mg capsule Commonly known as:  EFFEXOR-XR Take 1 Cap by mouth daily (with breakfast). * Notice: This list has 10 medication(s) that are the same as other medications prescribed for you. Read the directions carefully, and ask your doctor or other care provider to review them with you. Prescriptions Printed Refills  
 methylphenidate HCl (RITALIN) 5 mg tablet 0 Starting on: 6/29/2018 Sig: Take 1 Tab (5 mg total) by mouth two (2) times a dayEarliest Fill Date: 6/29/18. Max Daily Amount: 10 mg  
 Class: Print Route: Oral  
 methylphenidate HCl (RITALIN) 5 mg tablet 0 Starting on: 5/31/2018 Sig: Take 1 Tab (5 mg total) by mouth two (2) times a dayEarliest Fill Date: 5/31/18. Max Daily Amount: 10 mg  
 Class: Print Route: Oral  
 methylphenidate HCl (RITALIN) 5 mg tablet 0 Sig: Take 1 Tab (5 mg total) by mouth two (2) times a day. Max Daily Amount: 10 mg  
 Class: Print Route: Oral  
  
Prescriptions Sent to Pharmacy Refills  
 propranolol (INDERAL) 10 mg tablet 2 Sig: Take 1 Tab by mouth three (3) times daily. Class: Normal  
 Pharmacy: [address] [place], [address] [place] [place] [address] RD.  Ph #: [phone] Route: Oral  
  
Follow-up Instructions Return in about 3 months (around 8/1/2018) for MIGRAINE. Patient Instructions PRESCRIPTION REFILL POLICY [facility] Statement to Patients April 1, 2014 In an effort to ensure the large volume of patient prescription refills is processed in the most efficient and expeditious manner, we are asking our patients to assist us by calling your Pharmacy for all prescription refills, this will include also your  Mail Order Pharmacy. The pharmacy will contact our office electronically to continue the refill process. Please do not wait until the last minute to call your pharmacy. We need at least 48 hours (2days) to fill prescriptions. We also encourage you to call your pharmacy before going to  your prescription to make sure it is ready. With regard to controlled substance prescription refill requests (narcotic refills) that need to be picked up at our office, we ask your cooperation by providing us with at least 72 hours (3days) notice that you will need a refill. We will not refill narcotic prescription refill requests after 4:00pm on any weekday, Monday through Thursday, or after 2:00pm on Fridays, or on the weekends. We encourage everyone to explore another way of getting your prescription refill request processed using [employer], our patient web portal through our electronic medical record system. [employer] is an efficient and effective way to communicate your medication request directly to the office and  downloadable as an feng on your smart phone . [employer] also features a review functionality that allows you to view your medication list as well as leave messages for your physician. Are you ready to get connected? If so please review the attatched instructions or speak to any of our staff to get you set up right away! Thank you so much for your cooperation.  Should you have any questions please contact our Practice Administrator. The Physicians and Staff,  [facility] Please be advised there is a $25 fee for all paperwork to be completed from our  providers. This is to be paid by the patient prior to picking up the completed forms. A Healthy Lifestyle: Care Instructions Your Care Instructions A healthy lifestyle can help you feel good, stay at a healthy weight, and have plenty of energy for both work and play. A healthy lifestyle is something you can share with your whole family. A healthy lifestyle also can lower your risk for serious health problems, such as high blood pressure, heart disease, and diabetes. You can follow a few steps listed below to improve your health and the health of your family. Follow-up care is a key part of your treatment and safety. Be sure to make and go to all appointments, and call your doctor if you are having problems. It's also a good idea to know your test results and keep a list of the medicines you take. How can you care for yourself at home? · Do not eat too much sugar, fat, or fast foods. You can still have dessert and treats now and then. The goal is moderation. · Start small to improve your eating habits. Pay attention to portion sizes, drink less juice and soda pop, and eat more fruits and vegetables. ¨ Eat a healthy amount of food. A 3-ounce serving of meat, for example, is about the size of a deck of cards. Fill the rest of your plate with vegetables and whole grains. ¨ Limit the amount of soda and sports drinks you have every day. Drink more water when you are thirsty. ¨ Eat at least 5 servings of fruits and vegetables every day. It may seem like a lot, but it is not hard to reach this goal. A serving or helping is 1 piece of fruit, 1 cup of vegetables, or 2 cups of leafy, raw vegetables.  Have an apple or some carrot sticks as an afternoon snack instead of a candy bar. Try to have fruits and/or vegetables at every meal. 
· Make exercise part of your daily routine. You may want to start with simple activities, such as walking, bicycling, or slow swimming. Try to be active 30 to 60 minutes every day. You do not need to do all 30 to 60 minutes all at once. For example, you can exercise 3 times a day for 10 or 20 minutes. Moderate exercise is safe for most people, but it is always a good idea to talk to your doctor before starting an exercise program. 
· Keep moving. Clent Cramp the lawn, work in the garden, or [employer]. Take the stairs instead of the elevator at work. · If you smoke, quit. People who smoke have an increased risk for heart attack, stroke, cancer, and other lung illnesses. Quitting is hard, but there are ways to boost your chance of quitting tobacco for good. ¨ Use nicotine gum, patches, or lozenges. ¨ Ask your doctor about stop-smoking programs and medicines. ¨ Keep trying. In addition to reducing your risk of diseases in the future, you will notice some benefits soon after you stop using tobacco. If you have shortness of breath or asthma symptoms, they will likely get better within a few weeks after you quit. · Limit how much alcohol you drink. Moderate amounts of alcohol (up to 2 drinks a day for men, 1 drink a day for women) are okay. But drinking too much can lead to liver problems, high blood pressure, and other health problems. Family health If you have a family, there are many things you can do together to improve your health. · Eat meals together as a family as often as possible. · Eat healthy foods. This includes fruits, vegetables, lean meats and dairy, and whole grains. · Include your family in your fitness plan. Most people think of activities such as jogging or tennis as the way to fitness, but there are many ways you and your family can be more active.  Anything that makes you breathe hard and gets your heart pumping is exercise. Here are some tips: 
¨ Walk to do errands or to take your child to school or the bus. ¨ Go for a family bike ride after dinner instead of watching TV. Where can you learn more? Go to http://yoly-chelsea.info/. Enter G599 in the search box to learn more about \"A Healthy Lifestyle: Care Instructions. \" Current as of: May 12, 2017 Content Version: 11.4 © 8448-6005 [employer]. Care instructions adapted under license by [employer] (which disclaims liability or warranty for this information). If you have questions about a medical condition or this instruction, always ask your healthcare professional. [address] any warranty or liability for your use of this information. Introducing [facility]! Dear Lulu Parikh: Thank you for requesting a [employer] account. Our records indicate that you already have an active [employer] account. You can access your account anytime at [email or web address] Did you know that you can access your hospital and ER discharge instructions at any time in [employer]? You can also review all of your test results from your hospital stay or ER visit. Additional Information If you have questions, please visit the Frequently Asked Questions section of the [employer] website at [email or web address] Remember, [employer] is NOT to be used for urgent needs. For medical emergencies, dial 911. Now available from your iPhone and Android! Please provide this summary of care documentation to your next provider. Your primary care clinician is listed as TYREE Steele. If you have any questions after today's visit, please call [phone].

## 2018-08-14 ENCOUNTER — OFFICE VISIT (OUTPATIENT)
Dept: NEUROLOGY | Age: 43
End: 2018-08-14

## 2018-08-14 VITALS
WEIGHT: 181 LBS | BODY MASS INDEX: 30.16 KG/M2 | SYSTOLIC BLOOD PRESSURE: 124 MMHG | HEART RATE: 88 BPM | OXYGEN SATURATION: 99 % | HEIGHT: 65 IN | DIASTOLIC BLOOD PRESSURE: 84 MMHG

## 2018-08-14 DIAGNOSIS — G43.711 INTRACTABLE CHRONIC MIGRAINE WITHOUT AURA AND WITH STATUS MIGRAINOSUS: Primary | ICD-10-CM

## 2018-08-14 DIAGNOSIS — F41.9 ANXIETY: ICD-10-CM

## 2018-08-14 DIAGNOSIS — F90.0 ATTENTION DEFICIT HYPERACTIVITY DISORDER (ADHD), PREDOMINANTLY INATTENTIVE TYPE: ICD-10-CM

## 2018-08-14 RX ORDER — FROVATRIPTAN SUCCINATE 2.5 MG/1
TABLET, FILM COATED ORAL
Qty: 9 TAB | Refills: 11 | Status: SHIPPED | OUTPATIENT
Start: 2018-08-14 | End: 2020-01-07 | Stop reason: ALTCHOICE

## 2018-08-14 RX ORDER — PROPRANOLOL HYDROCHLORIDE 10 MG/1
10 TABLET ORAL 3 TIMES DAILY
Qty: 270 TAB | Refills: 2 | Status: SHIPPED | OUTPATIENT
Start: 2018-08-14 | End: 2019-09-28 | Stop reason: SDUPTHER

## 2018-08-14 RX ORDER — METHYLPHENIDATE HYDROCHLORIDE 5 MG/1
5 TABLET ORAL 2 TIMES DAILY
Qty: 60 TAB | Refills: 0 | Status: SHIPPED | OUTPATIENT
Start: 2018-10-13 | End: 2018-11-27 | Stop reason: SDUPTHER

## 2018-08-14 RX ORDER — METHYLPHENIDATE HYDROCHLORIDE 5 MG/1
5 TABLET ORAL 2 TIMES DAILY
Qty: 60 TAB | Refills: 0 | Status: SHIPPED | OUTPATIENT
Start: 2018-09-13 | End: 2018-11-27 | Stop reason: SDUPTHER

## 2018-08-14 RX ORDER — BUTALBITAL, ACETAMINOPHEN AND CAFFEINE 50; 325; 40 MG/1; MG/1; MG/1
1 TABLET ORAL
Qty: 20 TAB | Refills: 5 | Status: SHIPPED | OUTPATIENT
Start: 2018-08-14 | End: 2018-11-14 | Stop reason: SDUPTHER

## 2018-08-14 RX ORDER — VENLAFAXINE HYDROCHLORIDE 150 MG/1
150 CAPSULE, EXTENDED RELEASE ORAL
Qty: 90 CAP | Refills: 3 | Status: SHIPPED | OUTPATIENT
Start: 2018-08-14 | End: 2019-09-16 | Stop reason: SDUPTHER

## 2018-08-14 RX ORDER — METHYLPHENIDATE HYDROCHLORIDE 5 MG/1
5 TABLET ORAL 2 TIMES DAILY
Qty: 60 TAB | Refills: 0 | Status: SHIPPED | OUTPATIENT
Start: 2018-08-14 | End: 2018-11-27 | Stop reason: SDUPTHER

## 2018-08-14 NOTE — PATIENT INSTRUCTIONS
Office Policies        Phone calls/patient messages:    · Please allow up to 24 hours for someone in the office to contact you about your call or message. Be mindful your provider may be out of the office or your message may require further review. We encourage you to use [employer] for your messages as this is a faster, more efficient way to communicate with our office           Medication Refills:    · Prescription medications require up to 48 business hours to process. We encourage you to use [employer] for your refills. · For controlled medications: Please allow up to 72 business hours to process. Certain medications may require you to  a written prescription at our office. · NO narcotic/controlled medications will be prescribed after 4pm Monday through Friday or on weekends              Form/Paperwork Completion:    Please note there is a $25 fee for all paperwork completed by our providers. We ask that you allow 7-14 business days. Pre-payment is due prior to picking up/faxing the completed form. You may also download your forms to [employer] to have your doctor print off. A Healthy Lifestyle: Care Instructions  Your Care Instructions    A healthy lifestyle can help you feel good, stay at a healthy weight, and have plenty of energy for both work and play. A healthy lifestyle is something you can share with your whole family. A healthy lifestyle also can lower your risk for serious health problems, such as high blood pressure, heart disease, and diabetes. You can follow a few steps listed below to improve your health and the health of your family. Follow-up care is a key part of your treatment and safety. Be sure to make and go to all appointments, and call your doctor if you are having problems. It's also a good idea to know your test results and keep a list of the medicines you take. How can you care for yourself at home? · Do not eat too much sugar, fat, or fast foods.  You can still have dessert and treats now and then. The goal is moderation. · Start small to improve your eating habits. Pay attention to portion sizes, drink less juice and soda pop, and eat more fruits and vegetables. ¨ Eat a healthy amount of food. A 3-ounce serving of meat, for example, is about the size of a deck of cards. Fill the rest of your plate with vegetables and whole grains. ¨ Limit the amount of soda and sports drinks you have every day. Drink more water when you are thirsty. ¨ Eat at least 5 servings of fruits and vegetables every day. It may seem like a lot, but it is not hard to reach this goal. A serving or helping is 1 piece of fruit, 1 cup of vegetables, or 2 cups of leafy, raw vegetables. Have an apple or some carrot sticks as an afternoon snack instead of a candy bar. Try to have fruits and/or vegetables at every meal.  · Make exercise part of your daily routine. You may want to start with simple activities, such as walking, bicycling, or slow swimming. Try to be active 30 to 60 minutes every day. You do not need to do all 30 to 60 minutes all at once. For example, you can exercise 3 times a day for 10 or 20 minutes. Moderate exercise is safe for most people, but it is always a good idea to talk to your doctor before starting an exercise program.  · Keep moving. Shea Oh[employer]s the lawn, work in the garden, or [employer]. Take the stairs instead of the elevator at work. · If you smoke, quit. People who smoke have an increased risk for heart attack, stroke, cancer, and other lung illnesses. Quitting is hard, but there are ways to boost your chance of quitting tobacco for good. ¨ Use nicotine gum, patches, or lozenges. ¨ Ask your doctor about stop-smoking programs and medicines. ¨ Keep trying.   In addition to reducing your risk of diseases in the future, you will notice some benefits soon after you stop using tobacco. If you have shortness of breath or asthma symptoms, they will likely get better within a few weeks after you quit. · Limit how much alcohol you drink. Moderate amounts of alcohol (up to 2 drinks a day for men, 1 drink a day for women) are okay. But drinking too much can lead to liver problems, high blood pressure, and other health problems. Family health  If you have a family, there are many things you can do together to improve your health. · Eat meals together as a family as often as possible. · Eat healthy foods. This includes fruits, vegetables, lean meats and dairy, and whole grains. · Include your family in your fitness plan. Most people think of activities such as jogging or tennis as the way to fitness, but there are many ways you and your family can be more active. Anything that makes you breathe hard and gets your heart pumping is exercise. Here are some tips:  ¨ Walk to do errands or to take your child to school or the bus. ¨ Go for a family bike ride after dinner instead of watching TV. Where can you learn more? Go to http://yoly-chelsea.info/. Enter X569 in the search box to learn more about \"A Healthy Lifestyle: Care Instructions. \"  Current as of: December 7, 2017  Content Version: 11.7  © 5205-2156 [employer]. Care instructions adapted under license by [employer] (which disclaims liability or warranty for this information). If you have questions about a medical condition or this instruction, always ask your healthcare professional. [place] [address] any warranty or liability for your use of this information.

## 2018-08-14 NOTE — MR AVS SNAPSHOT
[address], 
[id number], [address] [facility] 
[phone] Patient: Sukumar Boykin MRN: [id number] Visit Information Date & Time Provider Department Dept. Phone Encounter #  
 8/14/2018 10:00 AM Roque Landon MD Neurology [facility] at [facility] [phone] [id number] Follow-up Instructions Return in about 3 months (around 11/14/2018). Your Appointments 11/27/2018 10:20 AM  
Follow Up with Roque Landon MD  
Neurology Clinic at [facility] [address]) Appt Note: Follow up [address] [facility], 
[address] [facility], [address] [address]  
[address] [facility], [address] [place] [address], [address] [facility] [address] [phone] Upcoming Health Maintenance Date Due DTaP/Tdap/Td series (1 - Tdap) 9/2/1998 PAP AKA CERVICAL CYTOLOGY 9/2/1998 Influenza Age 5 to Adult 8/1/2018 Allergies as of 8/14/2018  Review Complete On: 8/14/2018 By: Roque Landon MD  
  
 Severity Noted Reaction Type Reactions Paxil [Paroxetine Hcl] High 05/17/2017    Anaphylaxis Erythromycin  12/17/2010    Nausea and Vomiting Current Immunizations  Reviewed on 12/19/2012 Name Date Influenza Vaccine 9/28/2017 Zoster Vaccine, Live 9/28/2017 Not reviewed this visit You Were Diagnosed With   
  
 Codes Comments Chronic migraine without aura with status migrainosus, not intractable    -  Primary ICD-10-CM: J13.611 ICD-9-CM: 346.72 Essential hypertension     ICD-10-CM: I10 
ICD-9-CM: 401.9 Attention deficit     ICD-10-CM: R41.840 ICD-9-CM: 799.51 Attention deficit hyperactivity disorder (ADHD), predominantly inattentive type     ICD-10-CM: F90.0 ICD-9-CM: 314.00 Intractable chronic migraine without aura and with status migrainosus     ICD-10-CM: K62.647 ICD-9-CM: 346.73   
 Anxiety     ICD-10-CM: F41.9 ICD-9-CM: 300.00 Vitals BP Pulse Height(growth percentile) Weight(growth percentile) SpO2 BMI  
 124/84 88 5' 5\" (1.651 m) 181 lb (82.1 kg) 99% 30.12 kg/m2 OB Status Smoking Status Hysterectomy Former Smoker BMI and BSA Data Body Mass Index Body Surface Area  
 30.12 kg/m 2 1.94 m 2 Preferred Pharmacy Pharmacy Name Phone [facility] [address]. [phone] Your Updated Medication List  
  
   
This list is accurate as of 8/14/18 10:33 AM.  Always use your most recent med list.  
  
  
  
  
 baclofen 10 mg tablet Commonly known as:  LIORESAL Take  by mouth four (4) times daily. butalbital-acetaminophen-caffeine -40 mg per tablet Commonly known as:  Yuridia Jodi Take 1 Tab by mouth every six (6) hours as needed for Pain. cetirizine 10 mg tablet Commonly known as:  ZYRTEC Take  by mouth daily. FERATE 240 mg (27 mg iron) tablet Generic drug:  ferrous gluconate Take 240 mg by mouth daily. * frovatriptan 2.5 mg tablet Commonly known as:  Ronni Eisenmenger Take 1 Tab by mouth once as needed for Migraine. Can take a second dose two hours later, max 2 daily * frovatriptan 2.5 mg tablet Commonly known as:  Hunter Jamesonmenger TAKE ONE TABLET BY MOUTH FOR ONE DOSE AS NEEDED FOR MIGRAINE  
  
 gabapentin 600 mg tablet Commonly known as:  NEURONTIN Take 1 Tab by mouth three (3) times daily. ibuprofen 600 mg tablet Commonly known as:  MOTRIN Take 1 Tab by mouth every six (6) hours as needed for Pain.  
  
 meloxicam 15 mg tablet Commonly known as:  MOBIC Take 15 mg by mouth daily. * methylphenidate HCl 5 mg tablet Commonly known as:  RITALIN Take 1 Tab (5 mg total) by mouth two (2) times a day. Max Daily Amount: 10 mg  
  
 * methylphenidate HCl 5 mg tablet Commonly known as:  RITALIN  
 Take 1 Tab (5 mg total) by mouth two (2) times a dayEarliest Fill Date: 9/17/17. Max Daily Amount: 10 mg  
  
 * methylphenidate HCl 5 mg tablet Commonly known as:  RITALIN Take 1 Tab (5 mg total) by mouth two (2) times a dayEarliest Fill Date: 10/17/17. Max Daily Amount: 10 mg  
  
 * methylphenidate HCl 5 mg tablet Commonly known as:  RITALIN Take 1 Tab (5 mg total) by mouth two (2) times a day. Max Daily Amount: 10 mg  
  
 * methylphenidate HCl 5 mg tablet Commonly known as:  RITALIN Take 1 Tab (5 mg total) by mouth two (2) times a dayEarliest Fill Date: 9/13/18. Max Daily Amount: 10 mg  
Start taking on:  9/13/2018 * methylphenidate HCl 5 mg tablet Commonly known as:  RITALIN Take 1 Tab (5 mg total) by mouth two (2) times a dayEarliest Fill Date: 10/13/18. Max Daily Amount: 10 mg  
Start taking on:  10/13/2018 PREMARIN 1.25 mg tablet Generic drug:  conjugated estrogens Take 1.25 mg by mouth daily. * propranolol LA 60 mg SR capsule Commonly known as:  INDERAL LA Take 1 Cap by mouth daily. * propranolol 10 mg tablet Commonly known as:  INDERAL Take 1 Tab by mouth three (3) times daily. venlafaxine- mg capsule Commonly known as:  EFFEXOR-XR Take 1 Cap by mouth daily (with breakfast). * Notice: This list has 10 medication(s) that are the same as other medications prescribed for you. Read the directions carefully, and ask your doctor or other care provider to review them with you. Prescriptions Printed Refills  
 methylphenidate HCl (RITALIN) 5 mg tablet 0 Starting on: 10/13/2018 Sig: Take 1 Tab (5 mg total) by mouth two (2) times a dayEarliest Fill Date: 10/13/18. Max Daily Amount: 10 mg  
 Class: Print Route: Oral  
 methylphenidate HCl (RITALIN) 5 mg tablet 0 Starting on: 9/13/2018 Sig: Take 1 Tab (5 mg total) by mouth two (2) times a dayEarliest Fill Date: 9/13/18.   Max Daily Amount: 10 mg  
 Class: Print Route: Oral  
 methylphenidate HCl (RITALIN) 5 mg tablet 0 Sig: Take 1 Tab (5 mg total) by mouth two (2) times a day. Max Daily Amount: 10 mg  
 Class: Print Route: Oral  
  
Prescriptions Sent to Pharmacy Refills  
 propranolol (INDERAL) 10 mg tablet 2 Sig: Take 1 Tab by mouth three (3) times daily. Class: Normal  
 Pharmacy: [address] Ph #: [phone] Route: Oral  
 frovatriptan (FROVA) 2.5 mg tablet 11 Sig: TAKE ONE TABLET BY MOUTH FOR ONE DOSE AS NEEDED FOR MIGRAINE Class: Normal  
 Pharmacy: [address] Ph #: [phone]  
 venlafaxine-SR (EFFEXOR-XR) 150 mg capsule 3 Sig: Take 1 Cap by mouth daily (with breakfast). Class: Normal  
 Pharmacy: [address] Ph #: [phone] Route: Oral  
 butalbital-acetaminophen-caffeine (FIORICET, ESGIC) -40 mg per tablet 5 Sig: Take 1 Tab by mouth every six (6) hours as needed for Pain. Class: Normal  
 Pharmacy: [address] Ph #: [phone] Route: Oral  
  
Follow-up Instructions Return in about 3 months (around 11/14/2018). Patient Instructions Office Policies Phone calls/patient messages: · Please allow up to 24 hours for someone in the office to contact you about your call or message. Be mindful your provider may be out of the office or your message may require further review. We encourage you to use [employer] for your messages as this is a faster, more efficient way to communicate with our office Medication Refills: · Prescription medications require up to 48 business hours to process. We encourage you to use [employer] for your refills.   
· For controlled medications: Please allow up to 72 business hours to process. Certain medications may require you to  a written prescription at our office. · NO narcotic/controlled medications will be prescribed after 4pm Monday through Friday or on weekends Form/Paperwork Completion: 
 
Please note there is a $25 fee for all paperwork completed by our providers. We ask that you allow 7-14 business days. Pre-payment is due prior to picking up/faxing the completed form. You may also download your forms to [employer] to have your doctor print off. A Healthy Lifestyle: Care Instructions Your Care Instructions A healthy lifestyle can help you feel good, stay at a healthy weight, and have plenty of energy for both work and play. A healthy lifestyle is something you can share with your whole family. A healthy lifestyle also can lower your risk for serious health problems, such as high blood pressure, heart disease, and diabetes. You can follow a few steps listed below to improve your health and the health of your family. Follow-up care is a key part of your treatment and safety. Be sure to make and go to all appointments, and call your doctor if you are having problems. It's also a good idea to know your test results and keep a list of the medicines you take. How can you care for yourself at home? · Do not eat too much sugar, fat, or fast foods. You can still have dessert and treats now and then. The goal is moderation. · Start small to improve your eating habits. Pay attention to portion sizes, drink less juice and soda pop, and eat more fruits and vegetables. ¨ Eat a healthy amount of food. A 3-ounce serving of meat, for example, is about the size of a deck of cards. Fill the rest of your plate with vegetables and whole grains. ¨ Limit the amount of soda and sports drinks you have every day. Drink more water when you are thirsty. ¨ Eat at least 5 servings of fruits and vegetables every day.  It may seem like a lot, but it is not hard to reach this goal. A serving or helping is 1 piece of fruit, 1 cup of vegetables, or 2 cups of leafy, raw vegetables. Have an apple or some carrot sticks as an afternoon snack instead of a candy bar. Try to have fruits and/or vegetables at every meal. 
· Make exercise part of your daily routine. You may want to start with simple activities, such as walking, bicycling, or slow swimming. Try to be active 30 to 60 minutes every day. You do not need to do all 30 to 60 minutes all at once. For example, you can exercise 3 times a day for 10 or 20 minutes. Moderate exercise is safe for most people, but it is always a good idea to talk to your doctor before starting an exercise program. 
· Keep moving. Dallin Michael the lawn, work in the garden, or [employer]. Take the stairs instead of the elevator at work. · If you smoke, quit. People who smoke have an increased risk for heart attack, stroke, cancer, and other lung illnesses. Quitting is hard, but there are ways to boost your chance of quitting tobacco for good. ¨ Use nicotine gum, patches, or lozenges. ¨ Ask your doctor about stop-smoking programs and medicines. ¨ Keep trying. In addition to reducing your risk of diseases in the future, you will notice some benefits soon after you stop using tobacco. If you have shortness of breath or asthma symptoms, they will likely get better within a few weeks after you quit. · Limit how much alcohol you drink. Moderate amounts of alcohol (up to 2 drinks a day for men, 1 drink a day for women) are okay. But drinking too much can lead to liver problems, high blood pressure, and other health problems. Family health If you have a family, there are many things you can do together to improve your health. · Eat meals together as a family as often as possible. · Eat healthy foods. This includes fruits, vegetables, lean meats and dairy, and whole grains. · Include your family in your fitness plan. Most people think of activities such as jogging or tennis as the way to fitness, but there are many ways you and your family can be more active. Anything that makes you breathe hard and gets your heart pumping is exercise. Here are some tips: 
¨ Walk to do errands or to take your child to school or the bus. ¨ Go for a family bike ride after dinner instead of watching TV. Where can you learn more? Go to http://yoly-chelsea.info/. Enter W601 in the search box to learn more about \"A Healthy Lifestyle: Care Instructions. \" Current as of: December 7, 2017 Content Version: 11.7 © 7994-5767 [employer]. Care instructions adapted under license by [employer] (which disclaims liability or warranty for this information). If you have questions about a medical condition or this instruction, always ask your healthcare professional. [place] [address] any warranty or liability for your use of this information. Introducing \[facility] [place] [facility]\"" & HEALTH SERVICES! Dear Avinash Courts: Thank you for requesting a [employer] account. Our records indicate that you already have an active [employer] account. You can access your account anytime at [email or web address] Did you know that you can access your hospital and ER discharge instructions at any time in [employer]? You can also review all of your test results from your hospital stay or ER visit. Additional Information If you have questions, please visit the Frequently Asked Questions section of the [employer] website at [email or web address] Remember, [employer] is NOT to be used for urgent needs. For medical emergencies, dial 911. Now available from your iPhone and Android! Please provide this summary of care documentation to your next provider. Your primary care clinician is listed as TYREE Steele.  If you have any questions after today's visit, please call [phone].

## 2018-08-14 NOTE — PROGRESS NOTES
Chief Complaint: migraines    Returns for follow up. She has been doing well. She is compliant with her medication. No new health issues. No side effects There seems to be a shortage in frova. She will call the  and we will discuss alternatives if this is truly the case. Assesment and Plan  1. Intractable chronic migraine without aura and with status migrainosus  Continue propranolol     2. Anxiety  - venlafaxine-SR (EFFEXOR-XR) 150 mg capsule; Take 1 Cap by mouth daily (with breakfast). Dispense: 90 Cap; Refill: 3    3. Attention deficit hyperactivity disorder (ADHD), unspecified ADHD type  - methylphenidate (RITALIN) 5 mg tablet; Take 1 Tab (5 mg total) by mouth two (2) times a day. Max Daily Amount: 10 mg  Dispense: 60 Tab; Refill: 0      Allergies  Paxil [paroxetine hcl] and Erythromycin     Medications  Current Outpatient Prescriptions   Medication Sig    propranolol (INDERAL) 10 mg tablet Take 1 Tab by mouth two (2) times a day.  baclofen (LIORESAL) 10 mg tablet Take  by mouth four (4) times daily.  ibuprofen (MOTRIN) 600 mg tablet Take 1 Tab by mouth every six (6) hours as needed for Pain.  methylphenidate (RITALIN) 5 mg tablet Take 1 Tab (5 mg total) by mouth two (2) times a dayEarliest Fill Date: 4/18/17. Max Daily Amount: 10 mg    venlafaxine-SR (EFFEXOR-XR) 150 mg capsule Take 1 Cap by mouth daily (with breakfast).  frovatriptan (FROVA) 2.5 mg tablet Take 1 Tab by mouth once as needed for Migraine. Can take a second dose two hours later, max 2 daily    cetirizine (ZYRTEC) 10 mg tablet Take  by mouth daily.  ferrous gluconate (FERATE) 240 mg (27 mg iron) tablet Take 240 mg by mouth daily.  traMADol (ULTRAM-ER) 300 mg tablet Take 300 mg by mouth daily.  conjugated estrogens (PREMARIN) 1.25 mg tablet Take 1.25 mg by mouth daily.  gabapentin (NEURONTIN) 600 mg tablet Take 1 Tab by mouth three (3) times daily.  (Patient taking differently: Take 600 mg by mouth five (5) times daily.)    butalbital-acetaminophen-caffeine (FIORICET, ESGIC) -40 mg per tablet Take 1 Tab by mouth every six (6) hours as needed for Pain. Max Daily Amount: 2 Tabs. No current facility-administered medications for this visit. Medical History  Past Medical History:   Diagnosis Date    Anxiety     Arthritis     back    Asthma     has not required tx in past 10 years    Chronic pain     lumbar    Dyspepsia and other specified disorders of function of stomach     Hypertension     Migraine     Nausea & vomiting      Review of Systems   Constitutional: Negative for chills and fever. HENT: Negative for ear pain. Eyes: Negative for pain and discharge. Respiratory: Negative for cough and hemoptysis. Cardiovascular: Negative for chest pain and claudication. Gastrointestinal: Negative for constipation and diarrhea. Genitourinary: Negative for flank pain and hematuria. Musculoskeletal: Negative for back pain and myalgias. Skin: Negative for itching and rash. Neurological: Negative for tingling and tremors. Migraine headaches   Endo/Heme/Allergies: Negative for environmental allergies. Does not bruise/bleed easily. Psychiatric/Behavioral: Negative for depression and hallucinations. Exam:    Visit Vitals    /62    Pulse 77    Wt 169 lb (76.7 kg)    SpO2 98%    BMI 28.12 kg/m2      Gen Appearance: Well built no apparent distress  HEENT : Normocephalic atraumatic with anicteric sclera  Neck: Supple with no thyromegaly   Chest: Clear to auscultation, no wheezes or rubs    CardioVascular:   Regular in rhythm and rate with no murmurs  Carotids no bruit  No pedal edema    Abdomen: Soft non tender, distended with normal bowel sounds  Ext: Full range of motion  Skin: Supple, No rash    Neuro:  Awake alert and oriented to person and place and time  Recent and remote memory is intact  Speech: No aphasia and no dysarthria, intact repetition. Cranial Nerves: II-XII intact  Eyes: Full bilateral visual fields by confrontation. PERRLA EOMI   Motor: 5/5 in all major muscle groups   No tremors  Normal tone, no cogwheel rigidity  Reflexes: 2+ over biceps, triceps and brachioradialis tendons    2+ over the patellar and achilles tendons  Sensory: Intact to all modalities in both proximal and distal distributions  Coordination: Finger to nose and heel over shin to knee intact on both sides  Gait: Well balanced with normal arm swing        Imaging    MRI Results (most recent):    Results from Hospital Encounter encounter on 04/29/11   MRI SPINE LUMBAR WITHOUT CONTRAST  **Final Report**      ICD Codes / Adm. Diagnosis: 724.2   / LBP (LOW BACK PAIN)    Examination:  MR L SPINE WO CON  - [id number] - Apr 29 2011  6:55PM  Accession No:  [id number]  Reason:  lower back pain      REPORT:  CLINICAL HISTORY: Pain    INDICATION: Lower back pain    COMPARISON: None    TECHNIQUE: MR imaging of the lumbar spine was performed with sagittal T1,   T2, STIR;  axial T1, T2. Contrast was not administered. FINDINGS:    There is normal alignment of the lumbar spine. Vertebral body heights are   maintained. Marrow signal is normal.  The conus medullaris terminates at L1.     L1/2:  The spinal canal and neuroforamina are widely patent. L2/3:  Facet arthropathy and hypertrophy. Ligamentum flavum hypertrophy. The   spinal canal and neuroforamina are widely patent. L3/4:  Facet arthropathy and hypertrophy. Ligamentum flavum hypertrophy. spinal canal and neuroforamina are widely patent> . L4/5:  Moderate central disc protrusion. Mild facet arthropathy. Mild   central canal stenosis. This desiccation and loss of disc height. Facet   arthropathy and hypertrophy. Ligamentum flavum hypertrophy. The   neuroforamina are widely patent. L5/S1:  Mild central disc protrusion. The spinal canal and neuroforamina   are widely patent.     The visualized musculature and intraperitoneal structures are within normal   limits       IMPRESSION:  Multilevel degenerative changes most severe at L4-L5 where there is moderate   central disc protrusion and mild central canal stenosis.           Interpreting/Reading Doctor: Janie Solomon ([id number])  Transcribed:  on 05/01/2011  Approved: Janie Solomon ([id number])  05/01/2011          Distribution:  Attending Doctor: Sandra Gomez              Lab Review    Lab Results   Component Value Date/Time    WBC 4.6 03/15/2016 03:03 PM    HCT 40.3 03/15/2016 03:03 PM    HGB 13.0 03/15/2016 03:03 PM    PLATELET 023 23/45/2916 03:03 PM       Lab Results   Component Value Date/Time    Sodium 142 03/15/2016 03:03 PM    Potassium 3.5 03/15/2016 03:03 PM    Chloride 105 03/15/2016 03:03 PM    CO2 28 03/15/2016 03:03 PM    Glucose 121 03/15/2016 03:03 PM    BUN 6 03/15/2016 03:03 PM    Creatinine 0.70 03/15/2016 03:03 PM    Calcium 8.8 03/15/2016 03:03 PM

## 2018-08-27 DIAGNOSIS — G43.701 CHRONIC MIGRAINE WITHOUT AURA WITH STATUS MIGRAINOSUS, NOT INTRACTABLE: Primary | ICD-10-CM

## 2018-08-27 RX ORDER — NARATRIPTAN 2.5 MG/1
2.5 TABLET ORAL
Qty: 9 TAB | Refills: 5 | Status: SHIPPED | OUTPATIENT
Start: 2018-08-27 | End: 2018-08-27

## 2018-10-11 ENCOUNTER — HOSPITAL ENCOUNTER (OUTPATIENT)
Dept: MAMMOGRAPHY | Age: 43
Discharge: HOME | End: 2018-10-11
Attending: SPECIALIST
Payer: COMMERCIAL

## 2018-10-11 DIAGNOSIS — Z12.39 SCREENING BREAST EXAMINATION: ICD-10-CM

## 2018-10-11 PROCEDURE — 77067 SCR MAMMO BI INCL CAD: CPT

## 2018-11-14 RX ORDER — BUTALBITAL, ACETAMINOPHEN AND CAFFEINE 50; 325; 40 MG/1; MG/1; MG/1
1 TABLET ORAL
Qty: 20 TAB | Refills: 5 | Status: SHIPPED | OUTPATIENT
Start: 2018-11-14 | End: 2018-11-27 | Stop reason: SDUPTHER

## 2018-11-14 NOTE — TELEPHONE ENCOUNTER
----- Message from Nabil Carrington sent at 11/14/2018  1:42 PM EST -----  Regarding: Dr. Bennetta Goldberg  Pt requested a refill for Butalbital 325/40/5, and uses the [address] [place] [address] on file. Pt best contact [phone].

## 2018-11-14 NOTE — TELEPHONE ENCOUNTER
Future Appointments   Date Time Provider Paula Salgadoi   11/27/2018 10:20 AM Ines Holland MD [address]                         Last Appointment My Department:  8/14/2018    Would you like to refill below? Requested Prescriptions     Pending Prescriptions Disp Refills    butalbital-acetaminophen-caffeine (FIORICET, ESGIC) -40 mg per tablet 20 Tab 5     Sig: Take 1 Tab by mouth every six (6) hours as needed for Pain.

## 2018-11-27 ENCOUNTER — OFFICE VISIT (OUTPATIENT)
Dept: NEUROLOGY | Age: 43
End: 2018-11-27

## 2018-11-27 VITALS
WEIGHT: 183 LBS | BODY MASS INDEX: 30.49 KG/M2 | DIASTOLIC BLOOD PRESSURE: 80 MMHG | OXYGEN SATURATION: 99 % | SYSTOLIC BLOOD PRESSURE: 132 MMHG | HEIGHT: 65 IN | HEART RATE: 68 BPM

## 2018-11-27 DIAGNOSIS — G43.719 INTRACTABLE CHRONIC MIGRAINE WITHOUT AURA AND WITHOUT STATUS MIGRAINOSUS: Primary | ICD-10-CM

## 2018-11-27 DIAGNOSIS — F41.9 ANXIETY: ICD-10-CM

## 2018-11-27 DIAGNOSIS — F98.8 ATTENTION DEFICIT DISORDER (ADD) WITHOUT HYPERACTIVITY: ICD-10-CM

## 2018-11-27 RX ORDER — METHYLPHENIDATE HYDROCHLORIDE 5 MG/1
5 TABLET ORAL 2 TIMES DAILY
Qty: 60 TAB | Refills: 0 | Status: SHIPPED | OUTPATIENT
Start: 2019-01-25 | End: 2018-11-27 | Stop reason: SDUPTHER

## 2018-11-27 RX ORDER — BUTALBITAL, ACETAMINOPHEN AND CAFFEINE 50; 325; 40 MG/1; MG/1; MG/1
1 TABLET ORAL
Qty: 20 TAB | Refills: 5 | Status: SHIPPED | OUTPATIENT
Start: 2018-11-27 | End: 2019-02-21 | Stop reason: SDUPTHER

## 2018-11-27 RX ORDER — NARATRIPTAN 2.5 MG/1
2.5 TABLET ORAL
COMMUNITY
End: 2018-11-27 | Stop reason: SDUPTHER

## 2018-11-27 RX ORDER — METHYLPHENIDATE HYDROCHLORIDE 5 MG/1
5 TABLET ORAL 2 TIMES DAILY
Qty: 60 TAB | Refills: 0 | Status: SHIPPED | OUTPATIENT
Start: 2018-11-27 | End: 2018-11-27 | Stop reason: SDUPTHER

## 2018-11-27 RX ORDER — NARATRIPTAN 2.5 MG/1
2.5 TABLET ORAL
Qty: 9 TAB | Refills: 11 | Status: SHIPPED | OUTPATIENT
Start: 2018-11-27 | End: 2019-11-25 | Stop reason: SDUPTHER

## 2018-11-27 RX ORDER — METHYLPHENIDATE HYDROCHLORIDE 5 MG/1
5 TABLET ORAL 2 TIMES DAILY
Qty: 60 TAB | Refills: 0 | Status: SHIPPED | OUTPATIENT
Start: 2018-12-27 | End: 2018-11-27 | Stop reason: SDUPTHER

## 2018-11-27 NOTE — PATIENT INSTRUCTIONS
A Healthy Lifestyle: Care Instructions  Your Care Instructions    A healthy lifestyle can help you feel good, stay at a healthy weight, and have plenty of energy for both work and play. A healthy lifestyle is something you can share with your whole family. A healthy lifestyle also can lower your risk for serious health problems, such as high blood pressure, heart disease, and diabetes. You can follow a few steps listed below to improve your health and the health of your family. Follow-up care is a key part of your treatment and safety. Be sure to make and go to all appointments, and call your doctor if you are having problems. It's also a good idea to know your test results and keep a list of the medicines you take. How can you care for yourself at home? · Do not eat too much sugar, fat, or fast foods. You can still have dessert and treats now and then. The goal is moderation. · Start small to improve your eating habits. Pay attention to portion sizes, drink less juice and soda pop, and eat more fruits and vegetables. ? Eat a healthy amount of food. A 3-ounce serving of meat, for example, is about the size of a deck of cards. Fill the rest of your plate with vegetables and whole grains. ? Limit the amount of soda and sports drinks you have every day. Drink more water when you are thirsty. ? Eat at least 5 servings of fruits and vegetables every day. It may seem like a lot, but it is not hard to reach this goal. A serving or helping is 1 piece of fruit, 1 cup of vegetables, or 2 cups of leafy, raw vegetables. Have an apple or some carrot sticks as an afternoon snack instead of a candy bar. Try to have fruits and/or vegetables at every meal.  · Make exercise part of your daily routine. You may want to start with simple activities, such as walking, bicycling, or slow swimming. Try to be active 30 to 60 minutes every day. You do not need to do all 30 to 60 minutes all at once.  For example, you can exercise 3 times a day for 10 or 20 minutes. Moderate exercise is safe for most people, but it is always a good idea to talk to your doctor before starting an exercise program.  · Keep moving. Vira Floss the lawn, work in the garden, or [employer]. Take the stairs instead of the elevator at work. · If you smoke, quit. People who smoke have an increased risk for heart attack, stroke, cancer, and other lung illnesses. Quitting is hard, but there are ways to boost your chance of quitting tobacco for good. ? Use nicotine gum, patches, or lozenges. ? Ask your doctor about stop-smoking programs and medicines. ? Keep trying. In addition to reducing your risk of diseases in the future, you will notice some benefits soon after you stop using tobacco. If you have shortness of breath or asthma symptoms, they will likely get better within a few weeks after you quit. · Limit how much alcohol you drink. Moderate amounts of alcohol (up to 2 drinks a day for men, 1 drink a day for women) are okay. But drinking too much can lead to liver problems, high blood pressure, and other health problems. Family health  If you have a family, there are many things you can do together to improve your health. · Eat meals together as a family as often as possible. · Eat healthy foods. This includes fruits, vegetables, lean meats and dairy, and whole grains. · Include your family in your fitness plan. Most people think of activities such as jogging or tennis as the way to fitness, but there are many ways you and your family can be more active. Anything that makes you breathe hard and gets your heart pumping is exercise. Here are some tips:  ? Walk to do errands or to take your child to school or the bus.  ? Go for a family bike ride after dinner instead of watching TV. Where can you learn more? Go to http://yoly-chelsea.info/. Enter O123 in the search box to learn more about \"A Healthy Lifestyle: Care Instructions. \"  Current as of: December 7, 2017  Content Version: 11.8  © 1802-7943 [employer], [employer]. Care instructions adapted under license by [employer] (which disclaims liability or warranty for this information). If you have questions about a medical condition or this instruction, always ask your healthcare professional. [address] any warranty or liability for your use of this information. Office Policies        Phone calls/patient messages:    · Please allow up to 24 hours for someone in the office to contact you about your call or message. Be mindful your provider may be out of the office or your message may require further review. We encourage you to use [employer] for your messages as this is a faster, more efficient way to communicate with our office           Medication Refills:    · Prescription medications require up to 48 business hours to process. We encourage you to use [employer] for your refills. · For controlled medications: Please allow up to 72 business hours to process. Certain medications may require you to  a written prescription at our office. · NO narcotic/controlled medications will be prescribed after 4pm Monday through Friday or on weekends              Form/Paperwork Completion:    · Please note there is a $25 fee for all paperwork completed by our providers. We ask that you allow 7-14 business days. Pre-payment is due prior to picking up/faxing the completed form. You may also download your forms to [employer] to have your doctor print off. Please note our office is moving to a new location at the end of December 2018. It will be at:  [address] [place] [address] 2 727 [facility], [facility], [address]      Please contact our office in one month for your follow up appointment.     Thank you

## 2018-11-27 NOTE — PROGRESS NOTES
Chief Complaint: migraines    Returns for follow up. She is happy with Amerge. Feels headaches have improves significantly. ADD is controlled on ritalin. Depression stable. Assesment and Plan  1. Intractable chronic migraine without aura and with status migrainosus  Continue propranolol   Continue Amerge    2. Anxiety  - venlafaxine-SR (EFFEXOR-XR) 150 mg capsule; Take 1 Cap by mouth daily (with breakfast). Dispense: 90 Cap; Refill: 3    3. Attention deficit hyperactivity disorder (ADHD), unspecified ADHD type  - methylphenidate (RITALIN) 5 mg tablet; Take 1 Tab (5 mg total) by mouth two (2) times a day. Max Daily Amount: 10 mg  Dispense: 60 Tab; Refill: 0      Allergies  Paxil [paroxetine hcl] and Erythromycin     Medications  Current Outpatient Prescriptions   Medication Sig    propranolol (INDERAL) 10 mg tablet Take 1 Tab by mouth two (2) times a day.  baclofen (LIORESAL) 10 mg tablet Take  by mouth four (4) times daily.  ibuprofen (MOTRIN) 600 mg tablet Take 1 Tab by mouth every six (6) hours as needed for Pain.  methylphenidate (RITALIN) 5 mg tablet Take 1 Tab (5 mg total) by mouth two (2) times a dayEarliest Fill Date: 4/18/17. Max Daily Amount: 10 mg    venlafaxine-SR (EFFEXOR-XR) 150 mg capsule Take 1 Cap by mouth daily (with breakfast).  frovatriptan (FROVA) 2.5 mg tablet Take 1 Tab by mouth once as needed for Migraine. Can take a second dose two hours later, max 2 daily    cetirizine (ZYRTEC) 10 mg tablet Take  by mouth daily.  ferrous gluconate (FERATE) 240 mg (27 mg iron) tablet Take 240 mg by mouth daily.  traMADol (ULTRAM-ER) 300 mg tablet Take 300 mg by mouth daily.  conjugated estrogens (PREMARIN) 1.25 mg tablet Take 1.25 mg by mouth daily.  gabapentin (NEURONTIN) 600 mg tablet Take 1 Tab by mouth three (3) times daily.  (Patient taking differently: Take 600 mg by mouth five (5) times daily.)    butalbital-acetaminophen-caffeine (FIORICET, ESGIC) -40 mg per tablet Take 1 Tab by mouth every six (6) hours as needed for Pain. Max Daily Amount: 2 Tabs. No current facility-administered medications for this visit. Medical History  Past Medical History:   Diagnosis Date    Anxiety     Arthritis     back    Asthma     has not required tx in past 10 years    Chronic pain     lumbar    Dyspepsia and other specified disorders of function of stomach     Hypertension     Migraine     Nausea & vomiting      Review of Systems   Constitutional: Negative for chills and fever. HENT: Negative for ear pain. Eyes: Negative for pain and discharge. Respiratory: Negative for cough and hemoptysis. Cardiovascular: Negative for chest pain and claudication. Gastrointestinal: Negative for constipation and diarrhea. Genitourinary: Negative for flank pain and hematuria. Musculoskeletal: Negative for back pain and myalgias. Skin: Negative for itching and rash. Neurological: Negative for tingling and tremors. Migraine headaches   Endo/Heme/Allergies: Negative for environmental allergies. Does not bruise/bleed easily. Psychiatric/Behavioral: Negative for depression and hallucinations. Exam:    Visit Vitals    /62    Pulse 77    Wt 169 lb (76.7 kg)    SpO2 98%    BMI 28.12 kg/m2      Gen Appearance: Well built no distress  HEENT : Normocephalic atraumatic with anicteric sclera  Neck: Supple with no thyromegaly   Chest: Clear to auscultation, no wheezes or rubs    CardioVascular:   Regular in rhythm and rate with no murmurs  Abdomen: Soft non tender, distended with normal bowel sounds  Ext: Full range of motion  Skin: Supple, No rash    Neuro:  Awake alert and oriented to person and place and time  Recent and remote memory is intact  Speech: No aphasia and no dysarthria, intact repetition.      Cranial Nerves: II-XII intact  Eyes: PERRLA EOMI   Motor: 5/5 in all major muscle groups   No tremors  Normal tone  Reflexes: 2+ over biceps, triceps and brachioradialis tendons    2+ over the patellar and achilles tendons  Sensory: Intact to all modalities in both proximal and distal distributions  Gait: Well balanced with normal arm swing        Imaging    MRI Results (most recent):    Results from Hospital Encounter encounter on 04/29/11   MRI SPINE LUMBAR WITHOUT CONTRAST  **Final Report**      ICD Codes / Adm. Diagnosis: 724.2   / LBP (LOW BACK PAIN)    Examination:  MR L SPINE WO CON  - [id number] - Apr 29 2011  6:55PM  Accession No:  [id number]  Reason:  lower back pain      REPORT:  CLINICAL HISTORY: Pain    INDICATION: Lower back pain    COMPARISON: None    TECHNIQUE: MR imaging of the lumbar spine was performed with sagittal T1,   T2, STIR;  axial T1, T2. Contrast was not administered. FINDINGS:    There is normal alignment of the lumbar spine. Vertebral body heights are   maintained. Marrow signal is normal.  The conus medullaris terminates at L1.     L1/2:  The spinal canal and neuroforamina are widely patent. L2/3:  Facet arthropathy and hypertrophy. Ligamentum flavum hypertrophy. The   spinal canal and neuroforamina are widely patent. L3/4:  Facet arthropathy and hypertrophy. Ligamentum flavum hypertrophy. spinal canal and neuroforamina are widely patent> . L4/5:  Moderate central disc protrusion. Mild facet arthropathy. Mild   central canal stenosis. This desiccation and loss of disc height. Facet   arthropathy and hypertrophy. Ligamentum flavum hypertrophy. The   neuroforamina are widely patent. L5/S1:  Mild central disc protrusion. The spinal canal and neuroforamina   are widely patent. The visualized musculature and intraperitoneal structures are within normal   limits       IMPRESSION:  Multilevel degenerative changes most severe at L4-L5 where there is moderate   central disc protrusion and mild central canal stenosis.           Interpreting/Reading Doctor: Rozanna Severance ([id number])  Transcribed:  on 05/01/2011  Approved: [facility] ([id number])  05/01/2011          Distribution:  Attending Doctor: Zafar Covert              Lab Review    Lab Results   Component Value Date/Time    WBC 4.6 03/15/2016 03:03 PM    HCT 40.3 03/15/2016 03:03 PM    HGB 13.0 03/15/2016 03:03 PM    PLATELET 932 88/66/7649 03:03 PM       Lab Results   Component Value Date/Time    Sodium 142 03/15/2016 03:03 PM    Potassium 3.5 03/15/2016 03:03 PM    Chloride 105 03/15/2016 03:03 PM    CO2 28 03/15/2016 03:03 PM    Glucose 121 03/15/2016 03:03 PM    BUN 6 03/15/2016 03:03 PM    Creatinine 0.70 03/15/2016 03:03 PM    Calcium 8.8 03/15/2016 03:03 PM

## 2019-02-21 ENCOUNTER — OFFICE VISIT (OUTPATIENT)
Dept: NEUROLOGY | Age: 44
End: 2019-02-21

## 2019-02-21 VITALS
OXYGEN SATURATION: 98 % | DIASTOLIC BLOOD PRESSURE: 82 MMHG | WEIGHT: 188 LBS | HEIGHT: 65 IN | HEART RATE: 80 BPM | BODY MASS INDEX: 31.32 KG/M2 | SYSTOLIC BLOOD PRESSURE: 124 MMHG

## 2019-02-21 DIAGNOSIS — J40 BRONCHITIS: Primary | ICD-10-CM

## 2019-02-21 DIAGNOSIS — F98.8 ATTENTION DEFICIT DISORDER (ADD) IN ADULT: ICD-10-CM

## 2019-02-21 DIAGNOSIS — F98.8 ATTENTION DEFICIT DISORDER (ADD): ICD-10-CM

## 2019-02-21 DIAGNOSIS — G43.719 INTRACTABLE CHRONIC MIGRAINE WITHOUT AURA AND WITHOUT STATUS MIGRAINOSUS: ICD-10-CM

## 2019-02-21 RX ORDER — METHYLPHENIDATE HYDROCHLORIDE 5 MG/1
5 TABLET ORAL 2 TIMES DAILY
Qty: 60 TAB | Refills: 0 | Status: SHIPPED | OUTPATIENT
Start: 2019-02-21 | End: 2019-05-16 | Stop reason: SDUPTHER

## 2019-02-21 RX ORDER — BUTALBITAL, ACETAMINOPHEN AND CAFFEINE 50; 325; 40 MG/1; MG/1; MG/1
1 TABLET ORAL
Qty: 20 TAB | Refills: 5 | Status: SHIPPED | OUTPATIENT
Start: 2019-02-21 | End: 2019-05-10 | Stop reason: SDUPTHER

## 2019-02-21 RX ORDER — METHYLPHENIDATE HYDROCHLORIDE 5 MG/1
5 TABLET ORAL 2 TIMES DAILY
Qty: 60 TAB | Refills: 0 | Status: SHIPPED | OUTPATIENT
Start: 2019-04-23 | End: 2019-05-16 | Stop reason: SDUPTHER

## 2019-02-21 NOTE — PATIENT INSTRUCTIONS
A Healthy Lifestyle: Care Instructions  Your Care Instructions    A healthy lifestyle can help you feel good, stay at a healthy weight, and have plenty of energy for both work and play. A healthy lifestyle is something you can share with your whole family. A healthy lifestyle also can lower your risk for serious health problems, such as high blood pressure, heart disease, and diabetes. You can follow a few steps listed below to improve your health and the health of your family. Follow-up care is a key part of your treatment and safety. Be sure to make and go to all appointments, and call your doctor if you are having problems. It's also a good idea to know your test results and keep a list of the medicines you take. How can you care for yourself at home? · Do not eat too much sugar, fat, or fast foods. You can still have dessert and treats now and then. The goal is moderation. · Start small to improve your eating habits. Pay attention to portion sizes, drink less juice and soda pop, and eat more fruits and vegetables. ? Eat a healthy amount of food. A 3-ounce serving of meat, for example, is about the size of a deck of cards. Fill the rest of your plate with vegetables and whole grains. ? Limit the amount of soda and sports drinks you have every day. Drink more water when you are thirsty. ? Eat at least 5 servings of fruits and vegetables every day. It may seem like a lot, but it is not hard to reach this goal. A serving or helping is 1 piece of fruit, 1 cup of vegetables, or 2 cups of leafy, raw vegetables. Have an apple or some carrot sticks as an afternoon snack instead of a candy bar. Try to have fruits and/or vegetables at every meal.  · Make exercise part of your daily routine. You may want to start with simple activities, such as walking, bicycling, or slow swimming. Try to be active 30 to 60 minutes every day. You do not need to do all 30 to 60 minutes all at once.  For example, you can exercise 3 times a day for 10 or 20 minutes. Moderate exercise is safe for most people, but it is always a good idea to talk to your doctor before starting an exercise program.  · Keep moving. Kb Espinosa the lawn, work in the garden, or [employer]. Take the stairs instead of the elevator at work. · If you smoke, quit. People who smoke have an increased risk for heart attack, stroke, cancer, and other lung illnesses. Quitting is hard, but there are ways to boost your chance of quitting tobacco for good. ? Use nicotine gum, patches, or lozenges. ? Ask your doctor about stop-smoking programs and medicines. ? Keep trying. In addition to reducing your risk of diseases in the future, you will notice some benefits soon after you stop using tobacco. If you have shortness of breath or asthma symptoms, they will likely get better within a few weeks after you quit. · Limit how much alcohol you drink. Moderate amounts of alcohol (up to 2 drinks a day for men, 1 drink a day for women) are okay. But drinking too much can lead to liver problems, high blood pressure, and other health problems. Family health  If you have a family, there are many things you can do together to improve your health. · Eat meals together as a family as often as possible. · Eat healthy foods. This includes fruits, vegetables, lean meats and dairy, and whole grains. · Include your family in your fitness plan. Most people think of activities such as jogging or tennis as the way to fitness, but there are many ways you and your family can be more active. Anything that makes you breathe hard and gets your heart pumping is exercise. Here are some tips:  ? Walk to do errands or to take your child to school or the bus.  ? Go for a family bike ride after dinner instead of watching TV. Where can you learn more? Go to http://yoly-chelsea.info/. Enter W331 in the search box to learn more about \"A Healthy Lifestyle: Care Instructions. \"  Current as of: September 11, 2018  Content Version: 11.9  © 9554-8221 [employer], [employer]. Care instructions adapted under license by [employer] (which disclaims liability or warranty for this information). If you have questions about a medical condition or this instruction, always ask your healthcare professional. [address] any warranty or liability for your use of this information. Office Policies      Paperwork            Any paperwork that needs to be completed has a 3 WEEK turnaround time. Phone calls/patient messages:    · Please allow up to 24 hours for someone in the office to contact you about your call or message. Be mindful your provider may be out of the office or your message may require further review. We encourage you to use [employer] for your messages as this is a faster, more efficient way to communicate with our office           Medication Refills:    · Prescription medications require up to 48 business hours to process. We encourage you to use [employer] for your refills. · For controlled medications: Please allow up to 72 business hours to process. Certain medications may require you to  a written prescription at our office.   · NO narcotic/controlled medications will be prescribed after 4pm Monday through Friday or on weekends

## 2019-02-21 NOTE — PROGRESS NOTES
Chief Complaint: migraines    Returns for follow-up visit. Has been compliant with her medications. Migraines have been controlled. No new medical issues have arisen. Here for medication refills. Assesment and Plan  1. Intractable chronic migraine without aura and with status migrainosus  Continue propranolol     2. Anxiety  - venlafaxine-SR (EFFEXOR-XR) 150 mg capsule; Take 1 Cap by mouth daily (with breakfast). Dispense: 90 Cap; Refill: 3    3. Attention deficit hyperactivity disorder (ADHD), unspecified ADHD type  - methylphenidate (RITALIN) 5 mg tablet; Take 1 Tab (5 mg total) by mouth two (2) times a day. Max Daily Amount: 10 mg  Dispense: 60 Tab; Refill: 0        Allergies  Paxil [paroxetine hcl] and Erythromycin     Medications  Current Outpatient Prescriptions   Medication Sig    propranolol (INDERAL) 10 mg tablet Take 1 Tab by mouth two (2) times a day.  baclofen (LIORESAL) 10 mg tablet Take  by mouth four (4) times daily.  ibuprofen (MOTRIN) 600 mg tablet Take 1 Tab by mouth every six (6) hours as needed for Pain.  methylphenidate (RITALIN) 5 mg tablet Take 1 Tab (5 mg total) by mouth two (2) times a dayEarliest Fill Date: 4/18/17. Max Daily Amount: 10 mg    venlafaxine-SR (EFFEXOR-XR) 150 mg capsule Take 1 Cap by mouth daily (with breakfast).  frovatriptan (FROVA) 2.5 mg tablet Take 1 Tab by mouth once as needed for Migraine. Can take a second dose two hours later, max 2 daily    cetirizine (ZYRTEC) 10 mg tablet Take  by mouth daily.  ferrous gluconate (FERATE) 240 mg (27 mg iron) tablet Take 240 mg by mouth daily.  traMADol (ULTRAM-ER) 300 mg tablet Take 300 mg by mouth daily.  conjugated estrogens (PREMARIN) 1.25 mg tablet Take 1.25 mg by mouth daily.  gabapentin (NEURONTIN) 600 mg tablet Take 1 Tab by mouth three (3) times daily.  (Patient taking differently: Take 600 mg by mouth five (5) times daily.)    butalbital-acetaminophen-caffeine (FIORICET, ESGIC) -40 mg per tablet Take 1 Tab by mouth every six (6) hours as needed for Pain. Max Daily Amount: 2 Tabs. No current facility-administered medications for this visit. Medical History  Past Medical History:   Diagnosis Date    Anxiety     Arthritis     back    Asthma     has not required tx in past 10 years    Chronic pain     lumbar    Dyspepsia and other specified disorders of function of stomach     Hypertension     Migraine     Nausea & vomiting      Review of Systems   Constitutional: Negative for chills and fever. HENT: Negative for ear pain. Eyes: Negative for pain and discharge. Respiratory: Negative for cough and hemoptysis. Cardiovascular: Positive for chest pain. Negative for claudication. Gastrointestinal: Negative for constipation and diarrhea. Genitourinary: Negative for flank pain and hematuria. Musculoskeletal: Negative for back pain and myalgias. Skin: Negative for itching and rash. Neurological: Positive for headaches. Negative for tingling and tremors. Migraine headaches   Endo/Heme/Allergies: Negative for environmental allergies. Does not bruise/bleed easily. Psychiatric/Behavioral: Negative for depression and hallucinations. Exam:    Visit Vitals    /62    Pulse 77    Wt 169 lb (76.7 kg)    SpO2 98%    BMI 28.12 kg/m2      General: Well developed, well nourished. Patient in no apparent distress   Head: Normocephalic, atraumatic, anicteric sclera   Neck Normal ROM, No thyromegally   Cardiac: Regular rate and rhythm   Ext: No pedal edema   Skin: Supple no rash     NeurologicExam:  Mental Status: Alert and oriented to person place and time   Speech: Fluent no aphasia or dysarthria. Cranial Nerves:  II - XII Intact   Motor:  Full and symmetric strength of upper and lower proximal and distal muscles. Normal bulk and tone. Sensory:   Symmetric and intact with no perceived deficits modalities involving small or large fibers. Gait:  Gait is balanced and fluid with normal arm swing. Tremor:   No tremor noted. Cerebellar:  Coordination intact. Imaging    MRI Results (most recent):    Results from Hospital Encounter encounter on 04/29/11   MRI SPINE LUMBAR WITHOUT CONTRAST  **Final Report**      ICD Codes / Adm. Diagnosis: 724.2   / LBP (LOW BACK PAIN)    Examination:  MR BEE SPINE SAMMIE CON  - [id number] - Apr 29 2011  6:55PM  Accession No:  [id number]  Reason:  lower back pain      REPORT:  CLINICAL HISTORY: Pain    INDICATION: Lower back pain    COMPARISON: None    TECHNIQUE: MR imaging of the lumbar spine was performed with sagittal T1,   T2, STIR;  axial T1, T2. Contrast was not administered. FINDINGS:    There is normal alignment of the lumbar spine. Vertebral body heights are   maintained. Marrow signal is normal.  The conus medullaris terminates at L1.     L1/2:  The spinal canal and neuroforamina are widely patent. L2/3:  Facet arthropathy and hypertrophy. Ligamentum flavum hypertrophy. The   spinal canal and neuroforamina are widely patent. L3/4:  Facet arthropathy and hypertrophy. Ligamentum flavum hypertrophy. spinal canal and neuroforamina are widely patent> . L4/5:  Moderate central disc protrusion. Mild facet arthropathy. Mild   central canal stenosis. This desiccation and loss of disc height. Facet   arthropathy and hypertrophy. Ligamentum flavum hypertrophy. The   neuroforamina are widely patent. L5/S1:  Mild central disc protrusion. The spinal canal and neuroforamina   are widely patent. The visualized musculature and intraperitoneal structures are within normal   limits       IMPRESSION:  Multilevel degenerative changes most severe at L4-L5 where there is moderate   central disc protrusion and mild central canal stenosis.           Interpreting/Reading Doctor: Shakeel Stewart ([id number])  Transcribed:  on 05/01/2011  Approved: Shakeel Stewart ([id number])  05/01/2011          Distribution:  Attending Doctor: TARA SUNSHINE              Lab Review    Lab Results   Component Value Date/Time    WBC 4.6 03/15/2016 03:03 PM    HCT 40.3 03/15/2016 03:03 PM    HGB 13.0 03/15/2016 03:03 PM    PLATELET 673 24/34/0274 03:03 PM       Lab Results   Component Value Date/Time    Sodium 142 03/15/2016 03:03 PM    Potassium 3.5 03/15/2016 03:03 PM    Chloride 105 03/15/2016 03:03 PM    CO2 28 03/15/2016 03:03 PM    Glucose 121 03/15/2016 03:03 PM    BUN 6 03/15/2016 03:03 PM    Creatinine 0.70 03/15/2016 03:03 PM    Calcium 8.8 03/15/2016 03:03 PM

## 2019-05-10 DIAGNOSIS — G43.719 INTRACTABLE CHRONIC MIGRAINE WITHOUT AURA AND WITHOUT STATUS MIGRAINOSUS: ICD-10-CM

## 2019-05-10 NOTE — TELEPHONE ENCOUNTER
Patient called to request a refill on her Butalbital. She is currently out of medication.  She has an appt on May 16th.      [phone]

## 2019-05-10 NOTE — TELEPHONE ENCOUNTER
Future Appointments   Date Time Provider Paula Keena   5/16/2019  3:00 PM Angelo Cordon  [facility]                         Last Appointment My Department:  2/21/2019    Would you like to refill below? Requested Prescriptions     Pending Prescriptions Disp Refills    butalbital-acetaminophen-caffeine (FIORICET, ESGIC) -40 mg per tablet 20 Tab 5     Sig: Take 1 Tab by mouth every six (6) hours as needed for Pain.

## 2019-05-14 RX ORDER — BUTALBITAL, ACETAMINOPHEN AND CAFFEINE 50; 325; 40 MG/1; MG/1; MG/1
1 TABLET ORAL
Qty: 20 TAB | Refills: 5 | Status: SHIPPED | OUTPATIENT
Start: 2019-05-14 | End: 2019-08-07 | Stop reason: SDUPTHER

## 2019-05-16 ENCOUNTER — OFFICE VISIT (OUTPATIENT)
Dept: NEUROLOGY | Age: 44
End: 2019-05-16

## 2019-05-16 VITALS
HEART RATE: 67 BPM | BODY MASS INDEX: 31.49 KG/M2 | HEIGHT: 65 IN | DIASTOLIC BLOOD PRESSURE: 82 MMHG | SYSTOLIC BLOOD PRESSURE: 130 MMHG | WEIGHT: 189 LBS | OXYGEN SATURATION: 98 %

## 2019-05-16 DIAGNOSIS — F98.8 ATTENTION DEFICIT DISORDER (ADD) IN ADULT: ICD-10-CM

## 2019-05-16 DIAGNOSIS — G43.711 INTRACTABLE CHRONIC MIGRAINE WITHOUT AURA AND WITH STATUS MIGRAINOSUS: Primary | ICD-10-CM

## 2019-05-16 DIAGNOSIS — I10 ESSENTIAL HYPERTENSION: ICD-10-CM

## 2019-05-16 RX ORDER — METHYLPHENIDATE HYDROCHLORIDE 5 MG/1
5 TABLET ORAL 2 TIMES DAILY
Qty: 60 TAB | Refills: 0 | Status: SHIPPED | OUTPATIENT
Start: 2019-07-22 | End: 2020-01-07 | Stop reason: SDUPTHER

## 2019-05-16 RX ORDER — METHYLPHENIDATE HYDROCHLORIDE 5 MG/1
5 TABLET ORAL 2 TIMES DAILY
Qty: 60 TAB | Refills: 0 | Status: SHIPPED | OUTPATIENT
Start: 2019-06-20 | End: 2019-05-16 | Stop reason: CLARIF

## 2019-05-16 RX ORDER — METHYLPHENIDATE HYDROCHLORIDE 5 MG/1
5 TABLET ORAL 2 TIMES DAILY
Qty: 60 TAB | Refills: 0 | Status: SHIPPED | OUTPATIENT
Start: 2019-05-23 | End: 2020-01-07 | Stop reason: SDUPTHER

## 2019-05-16 RX ORDER — METHYLPHENIDATE HYDROCHLORIDE 5 MG/1
5 TABLET ORAL 2 TIMES DAILY
Qty: 60 TAB | Refills: 0 | Status: SHIPPED | OUTPATIENT
Start: 2019-06-22 | End: 2020-02-17 | Stop reason: SDUPTHER

## 2019-05-16 NOTE — PROGRESS NOTES
Chief Complaint: migraines    Returns for follow-up visit. Has been compliant with her medications. Migraines have been controlled. No new medical issues have arisen. Here for medication refills. Assesment and Plan  1. Attention deficit disorder (ADD) in adult    - methylphenidate HCl (RITALIN) 5 mg tablet; Take 1 Tab by mouth two (2) times a day. Max Daily Amount: 10 mg. Dispense: 60 Tab; Refill: 0  - methylphenidate HCl (RITALIN) 5 mg tablet; Take 1 Tab by mouth two (2) times a day. Max Daily Amount: 10 mg. Dispense: 60 Tab; Refill: 0  - methylphenidate HCl (RITALIN) 5 mg tablet; Take 1 Tab by mouth two (2) times a day. Max Daily Amount: 10 mg. Dispense: 60 Tab; Refill: 0    2. Intractable chronic migraine without aura and with status migrainosus  Continue frova    3. Essential hypertension  continue propranolol          Allergies  Paxil [paroxetine hcl] and Erythromycin     Medications  Current Outpatient Prescriptions   Medication Sig    propranolol (INDERAL) 10 mg tablet Take 1 Tab by mouth two (2) times a day.  baclofen (LIORESAL) 10 mg tablet Take  by mouth four (4) times daily.  ibuprofen (MOTRIN) 600 mg tablet Take 1 Tab by mouth every six (6) hours as needed for Pain.  methylphenidate (RITALIN) 5 mg tablet Take 1 Tab (5 mg total) by mouth two (2) times a dayEarliest Fill Date: 4/18/17. Max Daily Amount: 10 mg    venlafaxine-SR (EFFEXOR-XR) 150 mg capsule Take 1 Cap by mouth daily (with breakfast).  frovatriptan (FROVA) 2.5 mg tablet Take 1 Tab by mouth once as needed for Migraine. Can take a second dose two hours later, max 2 daily    cetirizine (ZYRTEC) 10 mg tablet Take  by mouth daily.  ferrous gluconate (FERATE) 240 mg (27 mg iron) tablet Take 240 mg by mouth daily.  traMADol (ULTRAM-ER) 300 mg tablet Take 300 mg by mouth daily.  conjugated estrogens (PREMARIN) 1.25 mg tablet Take 1.25 mg by mouth daily.     gabapentin (NEURONTIN) 600 mg tablet Take 1 Tab by mouth three (3) times daily. (Patient taking differently: Take 600 mg by mouth five (5) times daily.)    butalbital-acetaminophen-caffeine (FIORICET, ESGIC) -40 mg per tablet Take 1 Tab by mouth every six (6) hours as needed for Pain. Max Daily Amount: 2 Tabs. No current facility-administered medications for this visit. Medical History  Past Medical History:   Diagnosis Date    Anxiety     Arthritis     back    Asthma     has not required tx in past 10 years    Chronic pain     lumbar    Dyspepsia and other specified disorders of function of stomach     Hypertension     Migraine     Nausea & vomiting      Review of Systems   Constitutional: Negative for chills and fever. HENT: Negative for ear pain. Eyes: Negative for pain and discharge. Respiratory: Negative for cough and hemoptysis. Cardiovascular: Positive for chest pain. Negative for claudication. Gastrointestinal: Negative for constipation and diarrhea. Genitourinary: Negative for flank pain and hematuria. Musculoskeletal: Negative for back pain and myalgias. Skin: Negative for itching and rash. Neurological: Positive for headaches. Negative for tingling and tremors. Migraine headaches   Endo/Heme/Allergies: Negative for environmental allergies. Does not bruise/bleed easily. Psychiatric/Behavioral: Negative for depression and hallucinations. Exam:    Visit Vitals    /62    Pulse 77    Wt 169 lb (76.7 kg)    SpO2 98%    BMI 28.12 kg/m2      General: Well developed, well nourished. Patient in no apparent distress   Head: Normocephalic, atraumatic, anicteric sclera   Neck Normal ROM, No thyromegally   Cardiac: Regular rate and rhythm   Ext: No pedal edema   Skin: Supple no rash     NeurologicExam:  Mental Status: Alert and oriented to person place and time   Speech: Fluent no aphasia or dysarthria.    Cranial Nerves:  II - XII Intact   Motor:  Full and symmetric strength of upper and lower proximal and distal muscles. Normal bulk and tone. Sensory:   Symmetric and intact with no perceived deficits modalities involving small or large fibers. Gait:  Gait is balanced and fluid with normal arm swing. Tremor:   No tremor noted. Cerebellar:  Coordination intact. Imaging    MRI Results (most recent):    Results from Hospital Encounter encounter on 04/29/11   MRI SPINE LUMBAR WITHOUT CONTRAST  **Final Report**      ICD Codes / Adm. Diagnosis: 724.2   / LBP (LOW BACK PAIN)    Examination:  MR L SPINE WO CON  - [id number] - Apr 29 2011  6:55PM  Accession No:  [id number]  Reason:  lower back pain      REPORT:  CLINICAL HISTORY: Pain    INDICATION: Lower back pain    COMPARISON: None    TECHNIQUE: MR imaging of the lumbar spine was performed with sagittal T1,   T2, STIR;  axial T1, T2. Contrast was not administered. FINDINGS:    There is normal alignment of the lumbar spine. Vertebral body heights are   maintained. Marrow signal is normal.  The conus medullaris terminates at L1.     L1/2:  The spinal canal and neuroforamina are widely patent. L2/3:  Facet arthropathy and hypertrophy. Ligamentum flavum hypertrophy. The   spinal canal and neuroforamina are widely patent. L3/4:  Facet arthropathy and hypertrophy. Ligamentum flavum hypertrophy. spinal canal and neuroforamina are widely patent> . L4/5:  Moderate central disc protrusion. Mild facet arthropathy. Mild   central canal stenosis. This desiccation and loss of disc height. Facet   arthropathy and hypertrophy. Ligamentum flavum hypertrophy. The   neuroforamina are widely patent. L5/S1:  Mild central disc protrusion. The spinal canal and neuroforamina   are widely patent. The visualized musculature and intraperitoneal structures are within normal   limits       IMPRESSION:  Multilevel degenerative changes most severe at L4-L5 where there is moderate   central disc protrusion and mild central canal stenosis. Interpreting/Reading Doctor: Willard Kirby ([id number])  Transcribed:  on 05/01/2011  Approved: Willard Kirby ([id number])  05/01/2011          Distribution:  Attending Doctor: Facundo Hoskins              Lab Review    Lab Results   Component Value Date/Time    WBC 4.6 03/15/2016 03:03 PM    HCT 40.3 03/15/2016 03:03 PM    HGB 13.0 03/15/2016 03:03 PM    PLATELET 992 95/07/1415 03:03 PM       Lab Results   Component Value Date/Time    Sodium 142 03/15/2016 03:03 PM    Potassium 3.5 03/15/2016 03:03 PM    Chloride 105 03/15/2016 03:03 PM    CO2 28 03/15/2016 03:03 PM    Glucose 121 03/15/2016 03:03 PM    BUN 6 03/15/2016 03:03 PM    Creatinine 0.70 03/15/2016 03:03 PM    Calcium 8.8 03/15/2016 03:03 PM

## 2019-05-16 NOTE — PATIENT INSTRUCTIONS
A Healthy Lifestyle: Care Instructions  Your Care Instructions    A healthy lifestyle can help you feel good, stay at a healthy weight, and have plenty of energy for both work and play. A healthy lifestyle is something you can share with your whole family. A healthy lifestyle also can lower your risk for serious health problems, such as high blood pressure, heart disease, and diabetes. You can follow a few steps listed below to improve your health and the health of your family. Follow-up care is a key part of your treatment and safety. Be sure to make and go to all appointments, and call your doctor if you are having problems. It's also a good idea to know your test results and keep a list of the medicines you take. How can you care for yourself at home? · Do not eat too much sugar, fat, or fast foods. You can still have dessert and treats now and then. The goal is moderation. · Start small to improve your eating habits. Pay attention to portion sizes, drink less juice and soda pop, and eat more fruits and vegetables. ? Eat a healthy amount of food. A 3-ounce serving of meat, for example, is about the size of a deck of cards. Fill the rest of your plate with vegetables and whole grains. ? Limit the amount of soda and sports drinks you have every day. Drink more water when you are thirsty. ? Eat at least 5 servings of fruits and vegetables every day. It may seem like a lot, but it is not hard to reach this goal. A serving or helping is 1 piece of fruit, 1 cup of vegetables, or 2 cups of leafy, raw vegetables. Have an apple or some carrot sticks as an afternoon snack instead of a candy bar. Try to have fruits and/or vegetables at every meal.  · Make exercise part of your daily routine. You may want to start with simple activities, such as walking, bicycling, or slow swimming. Try to be active 30 to 60 minutes every day. You do not need to do all 30 to 60 minutes all at once.  For example, you can exercise 3 times a day for 10 or 20 minutes. Moderate exercise is safe for most people, but it is always a good idea to talk to your doctor before starting an exercise program.  · Keep moving. Zak Hardwick the lawn, work in the garden, or [employer]. Take the stairs instead of the elevator at work. · If you smoke, quit. People who smoke have an increased risk for heart attack, stroke, cancer, and other lung illnesses. Quitting is hard, but there are ways to boost your chance of quitting tobacco for good. ? Use nicotine gum, patches, or lozenges. ? Ask your doctor about stop-smoking programs and medicines. ? Keep trying. In addition to reducing your risk of diseases in the future, you will notice some benefits soon after you stop using tobacco. If you have shortness of breath or asthma symptoms, they will likely get better within a few weeks after you quit. · Limit how much alcohol you drink. Moderate amounts of alcohol (up to 2 drinks a day for men, 1 drink a day for women) are okay. But drinking too much can lead to liver problems, high blood pressure, and other health problems. Family health  If you have a family, there are many things you can do together to improve your health. · Eat meals together as a family as often as possible. · Eat healthy foods. This includes fruits, vegetables, lean meats and dairy, and whole grains. · Include your family in your fitness plan. Most people think of activities such as jogging or tennis as the way to fitness, but there are many ways you and your family can be more active. Anything that makes you breathe hard and gets your heart pumping is exercise. Here are some tips:  ? Walk to do errands or to take your child to school or the bus.  ? Go for a family bike ride after dinner instead of watching TV. Where can you learn more? Go to http://yoly-chelsea.info/. Enter X740 in the search box to learn more about \"A Healthy Lifestyle: Care Instructions. \"  Current as of: September 11, 2018  Content Version: 11.9  © 0025-9731 [employer]. Care instructions adapted under license by [employer] (which disclaims liability or warranty for this information). If you have questions about a medical condition or this instruction, always ask your healthcare professional. [address] any warranty or liability for your use of this information. Office Policies      Paperwork            Any paperwork that needs to be completed has a 3 WEEK turnaround time. Phone calls/patient messages:    · Please allow up to 24 hours for someone in the office to contact you about your call or message. Be mindful your provider may be out of the office or your message may require further review. We encourage you to use [employer] for your messages as this is a faster, more efficient way to communicate with our office           Medication Refills:    · Prescription medications require up to 48 business hours to process. We encourage you to use [employer] for your refills. · For controlled medications: Please allow up to 72 business hours to process. Certain medications may require you to  a written prescription at our office.   · NO narcotic/controlled medications will be prescribed after 4pm Monday through Friday or on weekends

## 2019-08-07 DIAGNOSIS — G43.719 INTRACTABLE CHRONIC MIGRAINE WITHOUT AURA AND WITHOUT STATUS MIGRAINOSUS: ICD-10-CM

## 2019-08-07 RX ORDER — BUTALBITAL, ACETAMINOPHEN AND CAFFEINE 50; 325; 40 MG/1; MG/1; MG/1
1 TABLET ORAL
Qty: 20 TAB | Refills: 5 | Status: SHIPPED | OUTPATIENT
Start: 2019-08-07 | End: 2020-01-07 | Stop reason: ALTCHOICE

## 2019-08-07 NOTE — TELEPHONE ENCOUNTER
Patient requesting a refill for this medication. Future Appointments   Date Time Provider Paula Keena   10/17/2019 11:40 AM Nina Williamson MD [address] [place] [address]                         Last Appointment My Department:  5/16/2019    Would you like to refill below? Requested Prescriptions     Pending Prescriptions Disp Refills    butalbital-acetaminophen-caffeine (FIORICET, ESGIC) -40 mg per tablet 20 Tab 5     Sig: Take 1 Tab by mouth every six (6) hours as needed for Pain.

## 2019-08-21 DIAGNOSIS — F98.8 ATTENTION DEFICIT DISORDER (ADD) IN ADULT: Primary | ICD-10-CM

## 2019-08-21 RX ORDER — METHYLPHENIDATE HYDROCHLORIDE 5 MG/1
5 TABLET ORAL 2 TIMES DAILY
Qty: 60 TAB | Refills: 0 | Status: SHIPPED | OUTPATIENT
Start: 2019-09-20 | End: 2019-10-17

## 2019-08-21 RX ORDER — METHYLPHENIDATE HYDROCHLORIDE 5 MG/1
5 TABLET ORAL 2 TIMES DAILY
Qty: 60 TAB | Refills: 0 | Status: SHIPPED | OUTPATIENT
Start: 2019-10-20 | End: 2019-10-17

## 2019-08-21 RX ORDER — METHYLPHENIDATE HYDROCHLORIDE 5 MG/1
5 TABLET ORAL 2 TIMES DAILY
Qty: 60 TAB | Refills: 0 | Status: SHIPPED | OUTPATIENT
Start: 2019-08-21 | End: 2019-09-20

## 2019-09-16 DIAGNOSIS — F41.9 ANXIETY: ICD-10-CM

## 2019-09-18 RX ORDER — VENLAFAXINE HYDROCHLORIDE 150 MG/1
CAPSULE, EXTENDED RELEASE ORAL
Qty: 90 CAP | Refills: 1 | Status: SHIPPED | OUTPATIENT
Start: 2019-09-18 | End: 2020-02-18 | Stop reason: SDUPTHER

## 2019-09-18 NOTE — TELEPHONE ENCOUNTER
Future Appointments   Date Time Provider Paula Keena   10/17/2019 11:40 AM Elizabeth Guerrier MD [address] [place] [address]                         Last Appointment My Department:  Visit date not found    Would you like to refill below?     Requested Prescriptions     Pending Prescriptions Disp Refills    venlafaxine-SR (EFFEXOR-XR) 150 mg capsule [Pharmacy Med Name: VENLAFAXINE HCL  MG CAP] 90 Cap 1     Sig: TAKE ONE CAPSULE BY MOUTH DAILY WITH BREAKFAST

## 2019-09-28 DIAGNOSIS — G43.711 INTRACTABLE CHRONIC MIGRAINE WITHOUT AURA AND WITH STATUS MIGRAINOSUS: ICD-10-CM

## 2019-10-01 RX ORDER — FROVATRIPTAN SUCCINATE 2.5 MG/1
TABLET, FILM COATED ORAL
Qty: 9 TAB | Refills: 0 | Status: SHIPPED | OUTPATIENT
Start: 2019-10-01 | End: 2020-01-07 | Stop reason: ALTCHOICE

## 2019-10-01 RX ORDER — PROPRANOLOL HYDROCHLORIDE 10 MG/1
TABLET ORAL
Qty: 270 TAB | Refills: 1 | Status: SHIPPED | OUTPATIENT
Start: 2019-10-01 | End: 2020-03-27

## 2019-10-08 ENCOUNTER — TELEPHONE (OUTPATIENT)
Dept: NEUROLOGY | Age: 44
End: 2019-10-08

## 2019-10-08 NOTE — TELEPHONE ENCOUNTER
Faxed Frova PA request  manually to [address] for [address]) w/ Bailee's note from 2017 and May 2019 Dr. Leana Castro.

## 2019-10-15 NOTE — TELEPHONE ENCOUNTER
Frova denied by [employer]. Denial letter and original PA request scanned into chart. fyi to Dr. Chinedu Wells.

## 2019-10-15 NOTE — TELEPHONE ENCOUNTER
She has tried both maxalt (rizatriptan) and sumatriptan in the past and failed both. She followed me from my previous practice that's why it's not in her chart. So the reason for the denial is not valid.

## 2019-10-17 ENCOUNTER — OFFICE VISIT (OUTPATIENT)
Dept: NEUROLOGY | Age: 44
End: 2019-10-17

## 2019-10-17 VITALS
HEART RATE: 76 BPM | OXYGEN SATURATION: 98 % | SYSTOLIC BLOOD PRESSURE: 124 MMHG | WEIGHT: 181 LBS | HEIGHT: 65 IN | DIASTOLIC BLOOD PRESSURE: 74 MMHG | BODY MASS INDEX: 30.16 KG/M2

## 2019-10-17 DIAGNOSIS — F98.8 ATTENTION DEFICIT DISORDER (ADD) IN ADULT: ICD-10-CM

## 2019-10-17 DIAGNOSIS — G43.719 INTRACTABLE CHRONIC MIGRAINE WITHOUT AURA AND WITHOUT STATUS MIGRAINOSUS: Primary | ICD-10-CM

## 2019-10-17 RX ORDER — METHYLPHENIDATE HYDROCHLORIDE 5 MG/1
5 TABLET ORAL 2 TIMES DAILY
Qty: 60 TAB | Refills: 0 | Status: SHIPPED | OUTPATIENT
Start: 2019-11-16 | End: 2019-12-16

## 2019-10-17 RX ORDER — METHYLPHENIDATE HYDROCHLORIDE 5 MG/1
5 TABLET ORAL 2 TIMES DAILY
Qty: 60 TAB | Refills: 0 | Status: SHIPPED | OUTPATIENT
Start: 2019-12-16 | End: 2020-01-07 | Stop reason: SDUPTHER

## 2019-10-17 RX ORDER — METHYLPHENIDATE HYDROCHLORIDE 5 MG/1
5 TABLET ORAL 2 TIMES DAILY
Qty: 60 TAB | Refills: 0 | Status: SHIPPED | OUTPATIENT
Start: 2019-10-17 | End: 2019-11-16

## 2019-10-17 NOTE — PROGRESS NOTES
Chief Complaint: migraines    Returns for follow-up visit. Has been compliant with her medications. Migraines have been controlled. No new medical issues have arisen. Unfortunately her insurance company no longer covers Frova    Assesment and Plan  1. Attention deficit disorder (ADD) in adult  Continue  methylphenidate  - methylphenidate HCl (RITALIN) 5 mg tablet; Take 1 Tab by mouth two (2) times a day. Max Daily Amount: 10 mg. Dispense: 60 Tab; Refill: 0  - methylphenidate HCl (RITALIN) 5 mg tablet; Take 1 Tab by mouth two (2) times a day. Max Daily Amount: 10 mg. Dispense: 60 Tab; Refill: 0  - methylphenidate HCl (RITALIN) 5 mg tablet; Take 1 Tab by mouth two (2) times a day. Max Daily Amount: 10 mg. Dispense: 60 Tab; Refill: 0    2. Intractable chronic migraine without aura and with status migrainosus  Trial of Aimovig  First  dose of Aimovig given in clinic  Patient has failed Topamax  Patient has failed zonisamide  Patient has failed amitriptyline  Patient has failed Prozac  Patient is currently on propranolol  Each treatment option was tried more than 2 months  Patient suffers chronic intractable migraine headaches  Migraine frequency is approximately 10 month      3. Essential hypertension  continue propranolol          Allergies  Paxil [paroxetine hcl] and Erythromycin     Medications  Current Outpatient Prescriptions   Medication Sig    propranolol (INDERAL) 10 mg tablet Take 1 Tab by mouth two (2) times a day.  baclofen (LIORESAL) 10 mg tablet Take  by mouth four (4) times daily.  ibuprofen (MOTRIN) 600 mg tablet Take 1 Tab by mouth every six (6) hours as needed for Pain.  methylphenidate (RITALIN) 5 mg tablet Take 1 Tab (5 mg total) by mouth two (2) times a dayEarliest Fill Date: 4/18/17. Max Daily Amount: 10 mg    venlafaxine-SR (EFFEXOR-XR) 150 mg capsule Take 1 Cap by mouth daily (with breakfast).     frovatriptan (FROVA) 2.5 mg tablet Take 1 Tab by mouth once as needed for Migraine. Can take a second dose two hours later, max 2 daily    cetirizine (ZYRTEC) 10 mg tablet Take  by mouth daily.  ferrous gluconate (FERATE) 240 mg (27 mg iron) tablet Take 240 mg by mouth daily.  traMADol (ULTRAM-ER) 300 mg tablet Take 300 mg by mouth daily.  conjugated estrogens (PREMARIN) 1.25 mg tablet Take 1.25 mg by mouth daily.  gabapentin (NEURONTIN) 600 mg tablet Take 1 Tab by mouth three (3) times daily. (Patient taking differently: Take 600 mg by mouth five (5) times daily.)    butalbital-acetaminophen-caffeine (FIORICET, ESGIC) -40 mg per tablet Take 1 Tab by mouth every six (6) hours as needed for Pain. Max Daily Amount: 2 Tabs. No current facility-administered medications for this visit. Medical History  Past Medical History:   Diagnosis Date    Anxiety     Arthritis     back    Asthma     has not required tx in past 10 years    Chronic pain     lumbar    Dyspepsia and other specified disorders of function of stomach     Hypertension     Migraine     Nausea & vomiting      Review of Systems   Constitutional: Negative for chills and fever. HENT: Negative for ear pain. Eyes: Negative for pain and discharge. Respiratory: Negative for cough and hemoptysis. Cardiovascular: Negative for claudication. Gastrointestinal: Negative for constipation and diarrhea. Genitourinary: Negative for flank pain and hematuria. Musculoskeletal: Negative for back pain and myalgias. Skin: Negative for itching and rash. Neurological: Positive for headaches. Negative for tingling and tremors. Migraine headaches   Endo/Heme/Allergies: Negative for environmental allergies. Does not bruise/bleed easily. Psychiatric/Behavioral: Negative for depression and hallucinations. Exam:    Visit Vitals    /62    Pulse 77    Wt 169 lb (76.7 kg)    SpO2 98%    BMI 28.12 kg/m2      General: Well developed, well nourished.  Patient in no apparent distress Head: Normocephalic, atraumatic, anicteric sclera   Neck Normal ROM, No thyromegally   Cardiac: Regular rate and rhythm   Ext: No pedal edema   Skin: Supple no rash     NeurologicExam:  Mental Status: Alert and oriented to person place and time   Speech: Fluent no aphasia or dysarthria. Cranial Nerves:  II - XII Intact   Motor:  Full and symmetric strength of upper and lower proximal and distal muscles. Normal bulk and tone. Sensory:   Symmetric and intact with no perceived deficits modalities involving small or large fibers. Gait:  Gait is balanced and fluid with normal arm swing. Tremor:   No tremor noted. Cerebellar:  Coordination intact. Imaging    MRI Results (most recent):    Results from Hospital Encounter encounter on 04/29/11   MRI SPINE LUMBAR WITHOUT CONTRAST  **Final Report**      ICD Codes / Adm. Diagnosis: 724.2   / LBP (LOW BACK PAIN)    Examination:   L SPINE WO CON  - [id number] - Apr 29 2011  6:55PM  Accession No:  [id number]  Reason:  lower back pain      REPORT:  CLINICAL HISTORY: Pain    INDICATION: Lower back pain    COMPARISON: None    TECHNIQUE: MR imaging of the lumbar spine was performed with sagittal T1,   T2, STIR;  axial T1, T2. Contrast was not administered. FINDINGS:    There is normal alignment of the lumbar spine. Vertebral body heights are   maintained. Marrow signal is normal.  The conus medullaris terminates at L1.     L1/2:  The spinal canal and neuroforamina are widely patent. L2/3:  Facet arthropathy and hypertrophy. Ligamentum flavum hypertrophy. The   spinal canal and neuroforamina are widely patent. L3/4:  Facet arthropathy and hypertrophy. Ligamentum flavum hypertrophy. spinal canal and neuroforamina are widely patent> . L4/5:  Moderate central disc protrusion. Mild facet arthropathy. Mild   central canal stenosis. This desiccation and loss of disc height. Facet   arthropathy and hypertrophy. Ligamentum flavum hypertrophy.  The   neuroforamina are widely patent. L5/S1:  Mild central disc protrusion. The spinal canal and neuroforamina   are widely patent. The visualized musculature and intraperitoneal structures are within normal   limits       IMPRESSION:  Multilevel degenerative changes most severe at L4-L5 where there is moderate   central disc protrusion and mild central canal stenosis.           Interpreting/Reading Doctor: Omi Alvarenga ([id number])  Transcribed:  on 05/01/2011  Approved: [place] ([id number])  05/01/2011          Distribution:  Attending Doctor: Maura Deaciara              Lab Review    Lab Results   Component Value Date/Time    WBC 4.6 03/15/2016 03:03 PM    HCT 40.3 03/15/2016 03:03 PM    HGB 13.0 03/15/2016 03:03 PM    PLATELET 302 16/08/7858 03:03 PM       Lab Results   Component Value Date/Time    Sodium 142 03/15/2016 03:03 PM    Potassium 3.5 03/15/2016 03:03 PM    Chloride 105 03/15/2016 03:03 PM    CO2 28 03/15/2016 03:03 PM    Glucose 121 03/15/2016 03:03 PM    BUN 6 03/15/2016 03:03 PM    Creatinine 0.70 03/15/2016 03:03 PM    Calcium 8.8 03/15/2016 03:03 PM

## 2019-10-17 NOTE — PATIENT INSTRUCTIONS
A Healthy Lifestyle: Care Instructions  Your Care Instructions    A healthy lifestyle can help you feel good, stay at a healthy weight, and have plenty of energy for both work and play. A healthy lifestyle is something you can share with your whole family. A healthy lifestyle also can lower your risk for serious health problems, such as high blood pressure, heart disease, and diabetes. You can follow a few steps listed below to improve your health and the health of your family. Follow-up care is a key part of your treatment and safety. Be sure to make and go to all appointments, and call your doctor if you are having problems. It's also a good idea to know your test results and keep a list of the medicines you take. How can you care for yourself at home? · Do not eat too much sugar, fat, or fast foods. You can still have dessert and treats now and then. The goal is moderation. · Start small to improve your eating habits. Pay attention to portion sizes, drink less juice and soda pop, and eat more fruits and vegetables. ? Eat a healthy amount of food. A 3-ounce serving of meat, for example, is about the size of a deck of cards. Fill the rest of your plate with vegetables and whole grains. ? Limit the amount of soda and sports drinks you have every day. Drink more water when you are thirsty. ? Eat at least 5 servings of fruits and vegetables every day. It may seem like a lot, but it is not hard to reach this goal. A serving or helping is 1 piece of fruit, 1 cup of vegetables, or 2 cups of leafy, raw vegetables. Have an apple or some carrot sticks as an afternoon snack instead of a candy bar. Try to have fruits and/or vegetables at every meal.  · Make exercise part of your daily routine. You may want to start with simple activities, such as walking, bicycling, or slow swimming. Try to be active 30 to 60 minutes every day. You do not need to do all 30 to 60 minutes all at once.  For example, you can exercise 3 times a day for 10 or 20 minutes. Moderate exercise is safe for most people, but it is always a good idea to talk to your doctor before starting an exercise program.  · Keep moving. [place] the lawn, work in the garden, or [employer]. Take the stairs instead of the elevator at work. · If you smoke, quit. People who smoke have an increased risk for heart attack, stroke, cancer, and other lung illnesses. Quitting is hard, but there are ways to boost your chance of quitting tobacco for good. ? Use nicotine gum, patches, or lozenges. ? Ask your doctor about stop-smoking programs and medicines. ? Keep trying. In addition to reducing your risk of diseases in the future, you will notice some benefits soon after you stop using tobacco. If you have shortness of breath or asthma symptoms, they will likely get better within a few weeks after you quit. · Limit how much alcohol you drink. Moderate amounts of alcohol (up to 2 drinks a day for men, 1 drink a day for women) are okay. But drinking too much can lead to liver problems, high blood pressure, and other health problems. Family health  If you have a family, there are many things you can do together to improve your health. · Eat meals together as a family as often as possible. · Eat healthy foods. This includes fruits, vegetables, lean meats and dairy, and whole grains. · Include your family in your fitness plan. Most people think of activities such as jogging or tennis as the way to fitness, but there are many ways you and your family can be more active. Anything that makes you breathe hard and gets your heart pumping is exercise. Here are some tips:  ? Walk to do errands or to take your child to school or the bus.  ? Go for a family bike ride after dinner instead of watching TV. Where can you learn more? Go to http://yoly-chelsea.info/. Enter Z462 in the search box to learn more about \"A Healthy Lifestyle: Care Instructions. \"  Current as of: May 28, 2019  Content Version: 12.2  © 3519-6421 [employer]. Care instructions adapted under license by [employer] (which disclaims liability or warranty for this information). If you have questions about a medical condition or this instruction, always ask your healthcare professional. [address] any warranty or liability for your use of this information.

## 2019-10-23 ENCOUNTER — TELEPHONE (OUTPATIENT)
Dept: NEUROLOGY | Age: 44
End: 2019-10-23

## 2019-10-25 ENCOUNTER — TELEPHONE (OUTPATIENT)
Dept: NEUROLOGY | Age: 44
End: 2019-10-25

## 2019-10-28 ENCOUNTER — HOSPITAL ENCOUNTER (OUTPATIENT)
Dept: MAMMOGRAPHY | Age: 44
Discharge: HOME | End: 2019-10-28
Attending: SPECIALIST
Payer: COMMERCIAL

## 2019-10-28 DIAGNOSIS — Z12.31 VISIT FOR SCREENING MAMMOGRAM: ICD-10-CM

## 2019-10-28 PROCEDURE — 77067 SCR MAMMO BI INCL CAD: CPT

## 2019-11-22 ENCOUNTER — TELEPHONE (OUTPATIENT)
Dept: INTERNAL MEDICINE CLINIC | Age: 44
End: 2019-11-22

## 2019-11-22 NOTE — TELEPHONE ENCOUNTER
Patient would like to be reinstated with Dr Vashti Henderson as her PCP.     Call back  [phone]

## 2019-11-25 DIAGNOSIS — G43.719 INTRACTABLE CHRONIC MIGRAINE WITHOUT AURA AND WITHOUT STATUS MIGRAINOSUS: ICD-10-CM

## 2019-11-29 RX ORDER — NARATRIPTAN 2.5 MG/1
TABLET ORAL
Qty: 8 TAB | Refills: 10 | Status: SHIPPED | OUTPATIENT
Start: 2019-11-29 | End: 2020-04-06 | Stop reason: SDUPTHER

## 2020-01-07 ENCOUNTER — OFFICE VISIT (OUTPATIENT)
Dept: INTERNAL MEDICINE CLINIC | Age: 45
End: 2020-01-07

## 2020-01-07 VITALS
OXYGEN SATURATION: 98 % | WEIGHT: 177.4 LBS | TEMPERATURE: 98.1 F | RESPIRATION RATE: 20 BRPM | SYSTOLIC BLOOD PRESSURE: 118 MMHG | HEIGHT: 65 IN | BODY MASS INDEX: 29.56 KG/M2 | HEART RATE: 76 BPM | DIASTOLIC BLOOD PRESSURE: 76 MMHG

## 2020-01-07 DIAGNOSIS — G43.701 CHRONIC MIGRAINE WITHOUT AURA WITH STATUS MIGRAINOSUS, NOT INTRACTABLE: Primary | ICD-10-CM

## 2020-01-07 DIAGNOSIS — I10 ESSENTIAL HYPERTENSION: ICD-10-CM

## 2020-01-07 DIAGNOSIS — M48.062 SPINAL STENOSIS OF LUMBAR REGION WITH NEUROGENIC CLAUDICATION: ICD-10-CM

## 2020-01-07 NOTE — PROGRESS NOTES
Hans Moyer is a 45 y.o. female presenting for Follow Up Chronic Condition  . 1. Have you been to the ER, urgent care clinic since your last visit? Hospitalized since your last visit? No    2. Have you seen or consulted any other health care providers outside of the [address] [place] [address] since your last visit? Include any pap smears or colon screening. No    No flowsheet data found. No flowsheet data found.     3 most recent PHQ Screens 1/7/2020   Little interest or pleasure in doing things Not at all   Feeling down, depressed, irritable, or hopeless Not at all   Total Score PHQ 2 0       Medications Discontinued During This Encounter   Medication Reason    methylphenidate HCl (RITALIN) 5 mg tablet Duplicate Order    methylphenidate HCl (RITALIN) 5 mg tablet Duplicate Order    methylphenidate HCl (RITALIN) 5 mg tablet Duplicate Order

## 2020-01-07 NOTE — PATIENT INSTRUCTIONS
DASH Diet: Care Instructions  Your Care Instructions    The DASH diet is an eating plan that can help lower your blood pressure. DASH stands for Dietary Approaches to Stop Hypertension. Hypertension is high blood pressure. The DASH diet focuses on eating foods that are high in calcium, potassium, and magnesium. These nutrients can lower blood pressure. The foods that are highest in these nutrients are fruits, vegetables, low-fat dairy products, nuts, seeds, and legumes. But taking calcium, potassium, and magnesium supplements instead of eating foods that are high in those nutrients does not have the same effect. The DASH diet also includes whole grains, fish, and poultry. The DASH diet is one of several lifestyle changes your doctor may recommend to lower your high blood pressure. Your doctor may also want you to decrease the amount of sodium in your diet. Lowering sodium while following the DASH diet can lower blood pressure even further than just the DASH diet alone. Follow-up care is a key part of your treatment and safety. Be sure to make and go to all appointments, and call your doctor if you are having problems. It's also a good idea to know your test results and keep a list of the medicines you take. How can you care for yourself at home? Following the DASH diet  · Eat 4 to 5 servings of fruit each day. A serving is 1 medium-sized piece of fruit, ½ cup chopped or canned fruit, 1/4 cup dried fruit, or 4 ounces (½ cup) of fruit juice. Choose fruit more often than fruit juice. · Eat 4 to 5 servings of vegetables each day. A serving is 1 cup of lettuce or raw leafy vegetables, ½ cup of chopped or cooked vegetables, or 4 ounces (½ cup) of vegetable juice. Choose vegetables more often than vegetable juice. · Get 2 to 3 servings of low-fat and fat-free dairy each day. A serving is 8 ounces of milk, 1 cup of yogurt, or 1 ½ ounces of cheese. · Eat 6 to 8 servings of grains each day.  A serving is 1 slice of bread, 1 ounce of dry cereal, or ½ cup of cooked rice, pasta, or cooked cereal. Try to choose whole-grain products as much as possible. · Limit lean meat, poultry, and fish to 2 servings each day. A serving is 3 ounces, about the size of a deck of cards. · Eat 4 to 5 servings of nuts, seeds, and legumes (cooked dried beans, lentils, and split peas) each week. A serving is 1/3 cup of nuts, 2 tablespoons of seeds, or ½ cup of cooked beans or peas. · Limit fats and oils to 2 to 3 servings each day. A serving is 1 teaspoon of vegetable oil or 2 tablespoons of salad dressing. · Limit sweets and added sugars to 5 servings or less a week. A serving is 1 tablespoon jelly or jam, ½ cup sorbet, or 1 cup of lemonade. · Eat less than 2,300 milligrams (mg) of sodium a day. If you limit your sodium to 1,500 mg a day, you can lower your blood pressure even more. Tips for success  · Start small. Do not try to make dramatic changes to your diet all at once. You might feel that you are missing out on your favorite foods and then be more likely to not follow the plan. Make small changes, and stick with them. Once those changes become habit, add a few more changes. · Try some of the following:  ? Make it a goal to eat a fruit or vegetable at every meal and at snacks. This will make it easy to get the recommended amount of fruits and vegetables each day. ? Try yogurt topped with fruit and nuts for a snack or healthy dessert. ? Add lettuce, tomato, cucumber, and onion to sandwiches. ? Combine a ready-made pizza crust with low-fat mozzarella cheese and lots of vegetable toppings. Try using tomatoes, squash, spinach, broccoli, carrots, cauliflower, and onions. ? Have a variety of cut-up vegetables with a low-fat dip as an appetizer instead of chips and dip. ? Sprinkle sunflower seeds or chopped almonds over salads. Or try adding chopped walnuts or almonds to cooked vegetables.   ? Try some vegetarian meals using beans and peas. Add garbanzo or kidney beans to salads. Make burritos and tacos with mashed hahn beans or black beans. Where can you learn more? Go to http://yoly-chelsea.info/. Enter O882 in the search box to learn more about \"DASH Diet: Care Instructions. \"  Current as of: April 9, 2019  Content Version: 12.2  © 0300-1005 [employer]. Care instructions adapted under license by [employer] (which disclaims liability or warranty for this information). If you have questions about a medical condition or this instruction, always ask your healthcare professional. [address] any warranty or liability for your use of this information.

## 2020-01-07 NOTE — PROGRESS NOTES
This note will not be viewable in [address] Subjective:     Mrs. West presents to the office today after 2-year absence in general medical follow-up. The patient has been followed by specialist for her recent problems which have included chronic migraine headaches for which she is now on Aimovig, as needed Amerge, venlafaxine and Inderal.  She has been doing quite well on this regimen as she had been relatively disabled with her migraine headaches in the past.  She has been seen by  for this problem. The patient has hypertension and currently is on Inderal for this. Blood pressures have been well controlled and she has had no long-term side effects. She denies any focal neurological problems. She has a history of osteoarthritis of her spine and lumbar spinal stenosis with left-sided neurogenic claudication. She is followed at [facility] for this. She currently takes meloxicam, baclofen and gabapentin for this. She seems to be doing reasonably well on this regimen. She has a history of ADD and does take Ritalin on an as-needed basis. She notes that this continues to work effectively for her.     Past Medical History:   Diagnosis Date    Anxiety     Arthritis     back    Asthma     has not required tx in past 10 years    Chronic pain     lumbar    Dyspepsia and other specified disorders of function of stomach     Hypertension     Migraine     Nausea & vomiting      Past Surgical History:   Procedure Laterality Date    ABDOMEN SURGERY PROC UNLISTED      bowel resection 2012    HX GI  12/18/12     Laparoscopy, open right colectomy    HX GYN      hysterectomy    HX UROLOGICAL      bladder sling 2014 and jan 19 2015     Allergies   Allergen Reactions    Paxil [Paroxetine Hcl] Anaphylaxis    Erythromycin Nausea and Vomiting    Percocet [Oxycodone-Acetaminophen] Other (comments)     Personality changes     Current Outpatient Medications   Medication Sig Dispense Refill    naratriptan (AMERGE) 2.5 mg tab TAKE ONE TABLET BY MOUTH DAILY AS NEEDED FOR UP TO 1 DOSE 8 Tab 10    erenumab-aooe (AIMOVIG AUTOINJECTOR) 140 mg/mL injection 1 mL by SubCUTAneous route every thirty (30) days. 1 Each 11    propranolol (INDERAL) 10 mg tablet TAKE ONE TABLET BY MOUTH THREE TIMES A  Tab 1    venlafaxine-SR (EFFEXOR-XR) 150 mg capsule TAKE ONE CAPSULE BY MOUTH DAILY WITH BREAKFAST 90 Cap 1    methylphenidate HCl (RITALIN) 5 mg tablet Take 1 Tab by mouth two (2) times a day. Max Daily Amount: 10 mg. 60 Tab 0    meloxicam (MOBIC) 15 mg tablet Take 15 mg by mouth daily.  baclofen (LIORESAL) 10 mg tablet Take  by mouth four (4) times daily.  ibuprofen (MOTRIN) 600 mg tablet Take 1 Tab by mouth every six (6) hours as needed for Pain. 20 Tab 0    cetirizine (ZYRTEC) 10 mg tablet Take  by mouth daily.  ferrous gluconate (FERATE) 240 mg (27 mg iron) tablet Take 240 mg by mouth daily.  conjugated estrogens (PREMARIN) 1.25 mg tablet Take 1.25 mg by mouth daily.  gabapentin (NEURONTIN) 600 mg tablet Take 1 Tab by mouth three (3) times daily.  (Patient taking differently: Take 600 mg by mouth five (5) times daily.) 90 Tab 5     Social History     Socioeconomic History    Marital status:      Spouse name: Not on file    Number of children: Not on file    Years of education: Not on file    Highest education level: Not on file   Tobacco Use    Smoking status: Former Smoker    Smokeless tobacco: Never Used   Substance and Sexual Activity    Alcohol use: No    Drug use: No     Family History   Problem Relation Age of Onset    Hypertension Mother     Heart Disease Maternal Grandmother     Ovarian Cancer Maternal Grandmother         45s    Heart Disease Maternal Grandfather     Stroke Maternal Grandfather     Cancer Maternal Grandfather         colon, prostate    Cancer Paternal Grandmother         ovarian    Ovarian Cancer Paternal Grandmother         62s    Ovarian Cancer Maternal Aunt         35s    Breast Cancer Cousin        Review of Systems:  GEN: no weight loss, weight gain, fatigue or night sweats  CV: no PND, orthopnea, or palpitations  Resp: no dyspnea on exertion, no cough  Abd: no nausea, vomiting or diarrhea  EXT: denies edema, claudication  Endocrine: no hair loss, excessive thirst or polyuria  Neurological ROS: no TIA or stroke symptoms  ROS otherwise negative      Objective:     Visit Vitals  /76 (BP 1 Location: Left arm, BP Patient Position: Sitting)   Pulse 76   Temp 98.1 °F (36.7 °C) (Oral)   Resp 20   Ht 5' 5\" (1.651 m)   Wt 177 lb 6.4 oz (80.5 kg)   SpO2 98%   BMI 29.52 kg/m²     Body mass index is 29.52 kg/m². General:   alert, cooperative and no distress   Eyes: conjunctivae/sclerae clear. PERRL, EOM's intact   Mouth:  No oral lesions, no pharyngeal erythema, no exudates   Neck: Trachea midline, no thyromegaly, no bruits   Heart: S1 and S2 normal,no murmurs noted    Lungs: Clear to auscultation bilaterally, no increased work of breathing   Abdomen: Soft, nontender. Normal bowel sounds   Extremities: No edema or cyanosis   Neuro: ..alert, oriented x3,speech normal in context and clarity, cranial nerves II-XII intact,motor strength: full proximally and distally,gait: normal  reflexes: full and symmetric     Physical exam otherwise negative         Assessment/Plan:     Diagnoses and all orders for this visit:    Chronic migraine without aura with status migrainosus, not intractable    Essential hypertension    Spinal stenosis of lumbar region with neurogenic claudication        Other instructions: The patient's medications were reviewed and reconciled. No change in her regimen is recommended.     Body mass index is 29.5 and dietary counseling along with printed patient education is given    Continued specialty follow-up regarding her lumbar spinal stenosis and chronic migraines    Have recommended complete checkup later this year.    Follow-up and Dispositions    · Return in about 1 year (around 1/7/2021).          Feli Henao MD

## 2020-02-13 ENCOUNTER — TELEPHONE (OUTPATIENT)
Dept: NEUROLOGY | Age: 45
End: 2020-02-13

## 2020-02-13 RX ORDER — BUTALBITAL, ACETAMINOPHEN AND CAFFEINE 50; 325; 40 MG/1; MG/1; MG/1
TABLET ORAL
Qty: 20 TAB | Refills: 4 | Status: SHIPPED | OUTPATIENT
Start: 2020-02-13 | End: 2020-03-11

## 2020-02-17 ENCOUNTER — TELEPHONE (OUTPATIENT)
Dept: NEUROLOGY | Age: 45
End: 2020-02-17

## 2020-02-17 DIAGNOSIS — F98.8 ATTENTION DEFICIT DISORDER (ADD) IN ADULT: ICD-10-CM

## 2020-02-17 NOTE — TELEPHONE ENCOUNTER
Patient is requesting a refill for    Requested Prescriptions     Pending Prescriptions Disp Refills    methylphenidate HCl (RITALIN) 5 mg tablet 60 Tab 0     Sig: Take 1 Tab by mouth two (2) times a day. Max Daily Amount: 10 mg.      Future Appointments   Date Time Provider Paula Brink   4/17/2020  8:40 AM Stephon Carlisle MD [address] [place] [address]   7/9/2020 10:00 AM Daxa Gracia MD [address]                         Last Appointment My Department:  10/17/2019    Please advise

## 2020-02-18 ENCOUNTER — TELEPHONE (OUTPATIENT)
Dept: NEUROLOGY | Age: 45
End: 2020-02-18

## 2020-02-18 DIAGNOSIS — F41.9 ANXIETY: ICD-10-CM

## 2020-02-18 RX ORDER — METHYLPHENIDATE HYDROCHLORIDE 5 MG/1
5 TABLET ORAL 2 TIMES DAILY
Qty: 60 TAB | Refills: 0 | Status: SHIPPED | OUTPATIENT
Start: 2020-02-18 | End: 2020-03-19

## 2020-02-18 RX ORDER — VENLAFAXINE HYDROCHLORIDE 150 MG/1
CAPSULE, EXTENDED RELEASE ORAL
Qty: 90 CAP | Refills: 3 | Status: SHIPPED | OUTPATIENT
Start: 2020-02-18 | End: 2021-02-22

## 2020-02-18 NOTE — TELEPHONE ENCOUNTER
----- Message from Sukhwinder White sent at 2/18/2020 11:01 AM EST -----  Regarding: /Refill  Medication Refill    Caller (if not patient):      Relationship of caller (if not patient):      Best contact number(s): [phone]      Name of medication and dosage if known:Ritlin      Is patient out of this medication (yes/no):      Pharmacy name:    Pharmacy listed in chart? (yes/no):yes  Pharmacy phone number:      Details to clarify the request:Pt called requesting a call back when the medication ritlin is ready for  at the office .        Sukhwinder White

## 2020-02-19 NOTE — TELEPHONE ENCOUNTER
Spoke with patient and notified her that Ritalin prescription was filled and sent to [facility]

## 2020-03-11 RX ORDER — BUTALBITAL, ACETAMINOPHEN AND CAFFEINE 50; 325; 40 MG/1; MG/1; MG/1
TABLET ORAL
Qty: 20 TAB | Refills: 3 | Status: SHIPPED | OUTPATIENT
Start: 2020-03-11 | End: 2020-04-10 | Stop reason: SDUPTHER

## 2020-03-13 ENCOUNTER — TELEPHONE (OUTPATIENT)
Dept: INTERNAL MEDICINE CLINIC | Age: 45
End: 2020-03-13

## 2020-03-13 ENCOUNTER — OFFICE VISIT (OUTPATIENT)
Dept: INTERNAL MEDICINE CLINIC | Age: 45
End: 2020-03-13

## 2020-03-13 VITALS
OXYGEN SATURATION: 99 % | WEIGHT: 182.2 LBS | RESPIRATION RATE: 16 BRPM | TEMPERATURE: 98 F | DIASTOLIC BLOOD PRESSURE: 78 MMHG | HEART RATE: 62 BPM | HEIGHT: 65 IN | BODY MASS INDEX: 30.35 KG/M2 | SYSTOLIC BLOOD PRESSURE: 118 MMHG

## 2020-03-13 DIAGNOSIS — J20.9 ACUTE BRONCHITIS, UNSPECIFIED ORGANISM: Primary | ICD-10-CM

## 2020-03-13 RX ORDER — AZITHROMYCIN 250 MG/1
250 TABLET, FILM COATED ORAL SEE ADMIN INSTRUCTIONS
Qty: 6 TAB | Refills: 0 | Status: SHIPPED | OUTPATIENT
Start: 2020-03-13 | End: 2020-04-06 | Stop reason: ALTCHOICE

## 2020-03-13 NOTE — PROGRESS NOTES
This note will not be viewable in [address] Deandre Prakash is a 45 y.o. female and presents with Cough and Fatigue  . Subjective:  Mrs. West's today with complaints of cough and fatigue. Symptoms have been ongoing for 2 or 3 days. She denies any fevers, chills, body aches and has had no shortness of breath. She does have a history of asthma but states that her cough is distinctly different from this. She has not coughed up any purulent phlegm and has had no wheezing, shortness of breath or pleuritic pain. There is been no rhinorrhea, sore throat. She generalized malaise and slight fatigue. She did receive an influenza vaccination this year. The patient denies any international travel or exposure to anybody who has traveled internationally recently. She has not been around anybody with a severe respiratory illness that she is aware of. Past Medical History:   Diagnosis Date    Anxiety     Arthritis     back    Asthma     has not required tx in past 10 years    Chronic pain     lumbar    Dyspepsia and other specified disorders of function of stomach     Hypertension     Migraine     Nausea & vomiting      Past Surgical History:   Procedure Laterality Date    ABDOMEN SURGERY PROC UNLISTED      bowel resection 2012    HX GI  12/18/12     Laparoscopy, open right colectomy    HX GYN      hysterectomy    HX UROLOGICAL      bladder sling 2014 and jan 19 2015     Allergies   Allergen Reactions    Paxil [Paroxetine Hcl] Anaphylaxis    Erythromycin Nausea and Vomiting    Percocet [Oxycodone-Acetaminophen] Other (comments)     Personality changes     Current Outpatient Medications   Medication Sig Dispense Refill    butalbital-acetaminophen-caffeine (FIORICET, ESGIC) -40 mg per tablet TAKE ONE TABLET BY MOUTH EVERY 6 HOURS AS NEEDED PAIN 20 Tab 3    methylphenidate HCl (RITALIN) 5 mg tablet Take 1 Tab by mouth two (2) times a day.  Max Daily Amount: 10 mg. 60 Tab 0    venlafaxine-SR (EFFEXOR-XR) 150 mg capsule TAKE ONE CAPSULE BY MOUTH DAILY WITH BREAKFAST 90 Cap 3    naratriptan (AMERGE) 2.5 mg tab TAKE ONE TABLET BY MOUTH DAILY AS NEEDED FOR UP TO 1 DOSE 8 Tab 10    erenumab-aooe (AIMOVIG AUTOINJECTOR) 140 mg/mL injection 1 mL by SubCUTAneous route every thirty (30) days. 1 Each 11    propranolol (INDERAL) 10 mg tablet TAKE ONE TABLET BY MOUTH THREE TIMES A  Tab 1    meloxicam (MOBIC) 15 mg tablet Take 15 mg by mouth daily.  baclofen (LIORESAL) 10 mg tablet Take  by mouth four (4) times daily.  ibuprofen (MOTRIN) 600 mg tablet Take 1 Tab by mouth every six (6) hours as needed for Pain. 20 Tab 0    cetirizine (ZYRTEC) 10 mg tablet Take  by mouth daily.  ferrous gluconate (FERATE) 240 mg (27 mg iron) tablet Take 240 mg by mouth daily.  conjugated estrogens (PREMARIN) 1.25 mg tablet Take 1.25 mg by mouth daily.  gabapentin (NEURONTIN) 600 mg tablet Take 1 Tab by mouth three (3) times daily.  (Patient taking differently: Take 600 mg by mouth five (5) times daily.) 90 Tab 5     Social History     Socioeconomic History    Marital status:      Spouse name: Not on file    Number of children: Not on file    Years of education: Not on file    Highest education level: Not on file   Tobacco Use    Smoking status: Former Smoker    Smokeless tobacco: Never Used   Substance and Sexual Activity    Alcohol use: No    Drug use: No     Family History   Problem Relation Age of Onset    Hypertension Mother     Heart Disease Maternal Grandmother     Ovarian Cancer Maternal Grandmother         45s    Heart Disease Maternal Grandfather     Stroke Maternal Grandfather     Cancer Maternal Grandfather         colon, prostate    Cancer Paternal Grandmother         ovarian    Ovarian Cancer Paternal Grandmother         62s    Ovarian Cancer Maternal Aunt         35s    Breast Cancer Cousin        Review of Systems  Constitutional: negative for fevers, chills, anorexia and weight loss  Eyes:   negative for visual disturbance and irritation  ENT:   Negative for  sinus congestion and post nasal drainage. Respiratory:  Positive for cough without chest congestion   CV:   negative for chest pain, palpitations, lower extremity edema  GI:   negative for nausea, vomiting, diarrhea, abdominal pain,melena  Integumentary: negative for rash and pruritus  Neurological:  negative for headaches, dizziness, vertigo, memory problems and gait       Objective:  Visit Vitals  /78 (BP 1 Location: Right arm, BP Patient Position: Sitting)   Pulse 62   Temp 98 °F (36.7 °C) (Oral)   Resp 16   Ht 5' 5\" (1.651 m)   Wt 182 lb 3.2 oz (82.6 kg)   SpO2 99%   BMI 30.32 kg/m²     Body mass index is 30.32 kg/m². Physical Exam:   General appearance - alert, mildly ill appearing, and in no distress  Mental status - alert, oriented to person, place, and time  EYE-ANGELINA, EOMI, conjuctiva clear. No lid swelling or purulent drainage  ENT- TM's clear without A/F level. Pharynx slightly erythematous with drainage noted  Nose - normal and patent, no erythema,  Neck - supple, no significant adenopathy   Chest -you airway rhonchi present without wheezing   Heart - normal rate, regular rhythm, normal S1, S2, no murmurs, rubs, clicks or gallops   Skin-No rash appreciated  Neuro -alert, oriented, normal speech, no focal findings. Assessment/Plan:  Diagnoses and all orders for this visit:    Acute bronchitis, unspecified organism  -     XR CHEST PA LAT; Future        Other Instructions:  Chest x-ray is reviewed and prominent right bronchiolar markings are present but no infiltrate is seen    Have started a Z-Pascual as symptoms not classic for flu    Mucinex as directed    Increase po fluids    She understands that she is to report to the emergency room should there be any worsening of symptoms    Follow-up and Dispositions    · Return if symptoms worsen or fail to improve.            I have reviewed with the patient details of the assessment and plan and all questions were answered. Relevent patient education was performed. An After Visit Summary was printed and given to the patient. Tali Carrillo MD    Please note that this dictation was completed with [employer], the computer voice recognition software. Quite often unanticipated grammatical, syntax, homophones, and other interpretive errors are inadvertently transcribed by the computer software. Please disregard these errors. Please excuse any errors that have escaped final proofreading.

## 2020-03-13 NOTE — PROGRESS NOTES
Omi Drake is a 45 y.o. female presenting for Cough and Fatigue  . 1. Have you been to the ER, urgent care clinic since your last visit? Hospitalized since your last visit? No    2. Have you seen or consulted any other health care providers outside of the [address] [place] [address] since your last visit? Include any pap smears or colon screening. No    No flowsheet data found. No flowsheet data found. 3 most recent PHQ Screens 1/7/2020   Little interest or pleasure in doing things Not at all   Feeling down, depressed, irritable, or hopeless Not at all   Total Score PHQ 2 0       There are no discontinued medications.

## 2020-03-13 NOTE — TELEPHONE ENCOUNTER
Patient would like to be seen today as a sick visit. Patient stated she is very exhausted all week and has a cough that she feels from her lungs. Also a running nose. Patient stated no fever and no SOB.   UM:[phone]

## 2020-03-19 DIAGNOSIS — F98.8 ATTENTION DEFICIT DISORDER (ADD) IN ADULT: Primary | ICD-10-CM

## 2020-03-19 RX ORDER — METHYLPHENIDATE HYDROCHLORIDE 5 MG/1
5 TABLET ORAL 2 TIMES DAILY
Qty: 60 TAB | Refills: 0 | Status: SHIPPED | OUTPATIENT
Start: 2020-05-18 | End: 2020-04-06 | Stop reason: SDUPTHER

## 2020-03-19 RX ORDER — METHYLPHENIDATE HYDROCHLORIDE 5 MG/1
5 TABLET ORAL 2 TIMES DAILY
Qty: 60 TAB | Refills: 0 | Status: SHIPPED | OUTPATIENT
Start: 2020-03-19 | End: 2020-06-29 | Stop reason: SDUPTHER

## 2020-03-19 RX ORDER — METHYLPHENIDATE HYDROCHLORIDE 5 MG/1
5 TABLET ORAL 2 TIMES DAILY
Qty: 60 TAB | Refills: 0 | Status: SHIPPED | OUTPATIENT
Start: 2020-04-18 | End: 2020-04-06 | Stop reason: SDUPTHER

## 2020-03-25 DIAGNOSIS — G43.711 INTRACTABLE CHRONIC MIGRAINE WITHOUT AURA AND WITH STATUS MIGRAINOSUS: ICD-10-CM

## 2020-03-27 RX ORDER — PROPRANOLOL HYDROCHLORIDE 10 MG/1
TABLET ORAL
Qty: 270 TAB | Refills: 0 | Status: SHIPPED | OUTPATIENT
Start: 2020-03-27 | End: 2020-06-24

## 2020-04-02 ENCOUNTER — TELEPHONE (OUTPATIENT)
Dept: NEUROLOGY | Age: 45
End: 2020-04-02

## 2020-04-06 ENCOUNTER — VIRTUAL VISIT (OUTPATIENT)
Dept: INTERNAL MEDICINE CLINIC | Age: 45
End: 2020-04-06

## 2020-04-06 VITALS — HEIGHT: 65 IN | BODY MASS INDEX: 30.32 KG/M2 | WEIGHT: 182 LBS

## 2020-04-06 DIAGNOSIS — F41.9 ANXIETY: Primary | ICD-10-CM

## 2020-04-06 RX ORDER — BUSPIRONE HYDROCHLORIDE 5 MG/1
5 TABLET ORAL
Qty: 90 TAB | Refills: 0 | Status: SHIPPED | OUTPATIENT
Start: 2020-04-06 | End: 2020-04-29 | Stop reason: SDUPTHER

## 2020-04-06 RX ORDER — HYDROXYZINE 25 MG/1
25 TABLET, FILM COATED ORAL 4 TIMES DAILY
COMMUNITY
End: 2020-04-10 | Stop reason: ALTCHOICE

## 2020-04-06 NOTE — PROGRESS NOTES
This note will not be viewable in [address] Consent: Gay West, who was seen by synchronous (real-time) audio-video technology, and/or her healthcare decision maker, is aware that this patient-initiated, Telehealth encounter on 4/6/2020 is a billable service, with coverage as determined by her insurance carrier. She is aware that she may receive a bill and has provided verbal consent to proceed: Yes. Assessment & Plan:   Diagnoses and all orders for this visit:    1. Anxiety  -     busPIRone (BUSPAR) 5 mg tablet; Take 1 Tab by mouth three (3) times daily (with meals). I spent at least 15 minutes with this established patient, and >50% of the time was spent counseling and/or coordinating care regarding Acute anxiety related to her work and potential exposure to coronavirus  712  Subjective:   Erica West is a 45 y.o. female who was seen for Anxiety  The patient has a history of depression for which she has been on Effexor. She had been doing well on this medication until the recent coronavirus pandemic and now she works in an environment where she feels as though she is at risk of catching the illness. The patient currently works at [employer] and is working without a mask as she is unable to get a medical mask which they require her to wear if she is to wear one at work. Patient states that 1 of her coworkers has been placed on routine as she had a family member die of coronavirus. The patient states that over the last week she has been having increasing anxiety bordering on panic at times. She feels jittery, anxious has difficulty with sleep notes nervousness, etc.  She did call Jamal Sutherland and was prescribed hydroxyzine which is not helped. The patient denies any weight loss, sweats or tremor. She has had no palpitations or syncope. There is been no neurological dysfunction. Prior to Admission medications    Medication Sig Start Date End Date Taking? Authorizing Provider   hydrOXYzine HCL (ATARAX) 25 mg tablet Take 25 mg by mouth four (4) times daily. Yes Provider, Historical   busPIRone (BUSPAR) 5 mg tablet Take 1 Tab by mouth three (3) times daily (with meals). 4/6/20  Yes Nohemy Altamirano MD   propranoloL (INDERAL) 10 mg tablet TAKE ONE TABLET BY MOUTH THREE TIMES A DAY 3/27/20  Yes Jose Manuel Marion MD   methylphenidate HCl (RITALIN) 5 mg tablet Take 1 Tab by mouth two (2) times a day for 30 days. Max Daily Amount: 10 mg. 3/19/20 4/18/20 Yes Jose Manuel Marion MD   butalbital-acetaminophen-caffeine (FIORICET, ESGIC) -40 mg per tablet TAKE ONE TABLET BY MOUTH EVERY 6 HOURS AS NEEDED PAIN 3/11/20  Yes Jose Manuel Marion MD   venlafaxine-SR [facility] 150 mg capsule TAKE ONE CAPSULE BY MOUTH DAILY WITH BREAKFAST 2/18/20  Yes MD Susan Holguin (AIMOVIG AUTOINJECTOR) 140 mg/mL injection 1 mL by SubCUTAneous route every thirty (30) days. 10/17/19  Yes Romain Cline MD   meloxicam (MOBIC) 15 mg tablet Take 15 mg by mouth daily. Yes Provider, Historical   baclofen (LIORESAL) 10 mg tablet Take  by mouth four (4) times daily. Yes Other, MD Bryce   ibuprofen (MOTRIN) 600 mg tablet Take 1 Tab by mouth every six (6) hours as needed for Pain. 4/23/17  Yes Nafisa Ignacio DO   cetirizine (ZYRTEC) 10 mg tablet Take  by mouth daily. Yes Provider, Historical   ferrous gluconate (FERATE) 240 mg (27 mg iron) tablet Take 240 mg by mouth daily. Yes Provider, Historical   conjugated estrogens (PREMARIN) 1.25 mg tablet Take 1.25 mg by mouth daily. Yes Other, MD Bryce   gabapentin (NEURONTIN) 600 mg tablet Take 1 Tab by mouth three (3) times daily. Patient taking differently: Take 600 mg by mouth five (5) times daily.  11/5/12  Yes Ramesh Cm MD     Allergies   Allergen Reactions    Paxil [Paroxetine Hcl] Anaphylaxis    Erythromycin Nausea and Vomiting    Percocet [Oxycodone-Acetaminophen] Other (comments)     Personality changes Patient Active Problem List    Diagnosis Date Noted    Anxiety 04/06/2020     Priority: 1 - One    Spinal stenosis of lumbar region with neurogenic claudication 01/07/2020     Priority: 1 - One    Essential hypertension 05/01/2018     Priority: 1 - One    Chronic migraine without aura with status migrainosus, not intractable 05/01/2018     Priority: 1 - One    History of intussusception 04/27/2015     Priority: 1 - One    Lactic acidosis 03/24/2015     Priority: 1 - One    Colitis 03/23/2015     Priority: 1 - One    S/P right colectomy 02/01/2013     Priority: 1 - One     Current Outpatient Medications   Medication Sig Dispense Refill    hydrOXYzine HCL (ATARAX) 25 mg tablet Take 25 mg by mouth four (4) times daily.  busPIRone (BUSPAR) 5 mg tablet Take 1 Tab by mouth three (3) times daily (with meals). 90 Tab 0    propranoloL (INDERAL) 10 mg tablet TAKE ONE TABLET BY MOUTH THREE TIMES A  Tab 0    methylphenidate HCl (RITALIN) 5 mg tablet Take 1 Tab by mouth two (2) times a day for 30 days. Max Daily Amount: 10 mg. 60 Tab 0    butalbital-acetaminophen-caffeine (FIORICET, ESGIC) -40 mg per tablet TAKE ONE TABLET BY MOUTH EVERY 6 HOURS AS NEEDED PAIN 20 Tab 3    venlafaxine-SR (EFFEXOR-XR) 150 mg capsule TAKE ONE CAPSULE BY MOUTH DAILY WITH BREAKFAST 90 Cap 3    erenumab-aooe (AIMOVIG AUTOINJECTOR) 140 mg/mL injection 1 mL by SubCUTAneous route every thirty (30) days. 1 Each 11    meloxicam (MOBIC) 15 mg tablet Take 15 mg by mouth daily.  baclofen (LIORESAL) 10 mg tablet Take  by mouth four (4) times daily.  ibuprofen (MOTRIN) 600 mg tablet Take 1 Tab by mouth every six (6) hours as needed for Pain. 20 Tab 0    cetirizine (ZYRTEC) 10 mg tablet Take  by mouth daily.  ferrous gluconate (FERATE) 240 mg (27 mg iron) tablet Take 240 mg by mouth daily.  conjugated estrogens (PREMARIN) 1.25 mg tablet Take 1.25 mg by mouth daily.       gabapentin (NEURONTIN) 600 mg tablet Take 1 Tab by mouth three (3) times daily. (Patient taking differently: Take 600 mg by mouth five (5) times daily.) 90 Tab 5       ROSanxiety as noted with no evidence of panic or exacerbation of depression  Other systems negative        Objective:   Vital Signs: (As obtained by patient/caregiver at home)  Visit Vitals   5' 5\" (1.651 m)   Wt 182 lb (82.6 kg)   BMI 30.29 kg/m²        [INSTRUCTIONS:  \"[x]\" Indicates a positive item  \"[]\" Indicates a negative item  -- DELETE ALL ITEMS NOT EXAMINED]    Constitutional: [x] Appears well-developed and well-nourished [x] No apparent distress      [] Abnormal -     Mental status: [x] Alert and awake  [x] Oriented to person/place/time [x] Able to follow commands    [] Abnormal -     Eyes:   EOM    []  Normal    [] Abnormal -   Sclera  []  Normal    [] Abnormal -          Discharge []  None visible   [] Abnormal -     HENT: [] Normocephalic, atraumatic  [] Abnormal -   [] Mouth/Throat: Mucous membranes are moist    External Ears [] Normal  [] Abnormal -    Neck: [x] No visualized mass [] Abnormal -     Pulmonary/Chest: [] Respiratory effort normal   [] No visualized signs of difficulty breathing or respiratory distress        [] Abnormal -      Musculoskeletal:   [] Normal gait with no signs of ataxia         [] Normal range of motion of neck        [] Abnormal -     Neurological:        [] No Facial Asymmetry (Cranial nerve 7 motor function) (limited exam due to video visit)          [] No gaze palsy        [] Abnormal -          Skin:        [x] No significant exanthematous lesions or discoloration noted on facial skin         [] Abnormal -            Psychiatric:       [x] Normal Affect [] Abnormal -        [x] No Hallucinations    Other pertinent observable physical exam findings:-See above    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   She has limited access to medical PPE and therefore cannot wear a mask while she is working. She is having increased anxiety as a result of potential exposures during the course of her workday. I have recommended that we start her on BuSpar 5 mg in addition to her current medications and have her limit the hydroxyzine to just bedtime usage. We will have her use the BuSpar for 1 week to see how she responds to this and slowly increase the dosage over time depending on this response. As previously noted a 15-minute face-to-face video office visit occurred in regards to discussion of her current symptoms, treatment, side effects etc.    Follow-up with us should there be any worsening      We discussed the expected course, resolution and complications of the diagnosis(es) in detail. Medication risks, benefits, costs, interactions, and alternatives were discussed as indicated. I advised her to contact the office if her condition worsens, changes or fails to improve as anticipated. She expressed understanding with the diagnosis(es) and plan. Ramos Wood is a 45 y.o. female being evaluated by a video visit encounter for concerns as above. A caregiver was present when appropriate. Due to this being a TeleHealth encounter (During OBOXQ-19 public health emergency), evaluation of the following organ systems was limited: Vitals/Constitutional/EENT/Resp/CV/GI//MS/Neuro/Skin/Heme-Lymph-Imm. Pursuant to the emergency declaration under the [facility], 1135 waiver authority and the [employer] Act, this Virtual  Visit was conducted, with patient's (and/or legal guardian's) consent, to reduce the patient's risk of exposure to COVID-19 and provide necessary medical care. Services were provided through a video synchronous discussion virtually to substitute for in-person clinic visit.    Patient and provider were located at their places of employment during the time of this visit.         Chan Moseley MD

## 2020-04-06 NOTE — PROGRESS NOTES
Jose Seymour is a 45 y.o. female presenting for Anxiety  . 1. Have you been to the ER, urgent care clinic since your last visit? Hospitalized since your last visit? [facility] 4-2-20    2. Have you seen or consulted any other health care providers outside of the [address] [place] [address] since your last visit? Include any pap smears or colon screening. No    No flowsheet data found. No flowsheet data found.     3 most recent PHQ Screens 1/7/2020   Little interest or pleasure in doing things Not at all   Feeling down, depressed, irritable, or hopeless Not at all   Total Score PHQ 2 0       Medications Discontinued During This Encounter   Medication Reason    methylphenidate HCl (RITALIN) 5 mg tablet Duplicate Order    naratriptan (AMERGE) 2.5 mg tab Duplicate Order

## 2020-04-10 ENCOUNTER — VIRTUAL VISIT (OUTPATIENT)
Dept: NEUROLOGY | Age: 45
End: 2020-04-10

## 2020-04-10 VITALS — HEIGHT: 65 IN | BODY MASS INDEX: 30.29 KG/M2

## 2020-04-10 DIAGNOSIS — G43.701 CHRONIC MIGRAINE WITHOUT AURA WITH STATUS MIGRAINOSUS, NOT INTRACTABLE: Primary | ICD-10-CM

## 2020-04-10 DIAGNOSIS — F98.8 ATTENTION DEFICIT DISORDER (ADD) IN ADULT: ICD-10-CM

## 2020-04-10 DIAGNOSIS — F41.9 ANXIETY: ICD-10-CM

## 2020-04-10 RX ORDER — BUTALBITAL, ACETAMINOPHEN AND CAFFEINE 50; 325; 40 MG/1; MG/1; MG/1
TABLET ORAL
Qty: 20 TAB | Refills: 5 | Status: SHIPPED | OUTPATIENT
Start: 2020-04-10 | End: 2020-06-08

## 2020-04-10 RX ORDER — ALPRAZOLAM 0.25 MG/1
0.25 TABLET ORAL
Qty: 20 TAB | Refills: 2 | Status: SHIPPED | OUTPATIENT
Start: 2020-04-10 | End: 2020-05-20 | Stop reason: SDUPTHER

## 2020-04-10 RX ORDER — TRAMADOL HYDROCHLORIDE 300 MG/1
TABLET, FILM COATED, EXTENDED RELEASE ORAL DAILY
COMMUNITY

## 2020-04-10 NOTE — PROGRESS NOTES
Chief Complaint: migraines    Has good migraine control. Compliant with treatment. Has had several anxiety attacks with feelings of choking. And feeling trapped. Her PCP started her on BuSpar which she finds ineffective. [facility] [employer] and lives in fear of being exposed to covid 23 and exposing her family      Assesment and Plan  1. Intractable chronic migraine without aura and with status migrainosus  Continue  Aimovig  First  dose of Aimovig given in clinic  Patient has failed Topamax  Patient has failed zonisamide  Patient has failed amitriptyline  Patient has failed Prozac  Patient is currently on propranolol  Each treatment option was tried more than 2 months  Patient suffers chronic intractable migraine headaches  Migraine frequency is approximately 5 a month or less since starting aimovig    2. Attention deficit disorder (ADD) in adult  Continue  Methylphenidate hold if anxious    3. Essential hypertension  continue propranolol    4. Anxiety  continue buspar  Continue effexor  temporary xanax (temporary)  Encouraged mindefillness          Allergies  Paxil [paroxetine hcl] and Erythromycin     Medications  Current Outpatient Prescriptions   Medication Sig    propranolol (INDERAL) 10 mg tablet Take 1 Tab by mouth two (2) times a day.  baclofen (LIORESAL) 10 mg tablet Take  by mouth four (4) times daily.  ibuprofen (MOTRIN) 600 mg tablet Take 1 Tab by mouth every six (6) hours as needed for Pain.  methylphenidate (RITALIN) 5 mg tablet Take 1 Tab (5 mg total) by mouth two (2) times a dayEarliest Fill Date: 4/18/17. Max Daily Amount: 10 mg    venlafaxine-SR (EFFEXOR-XR) 150 mg capsule Take 1 Cap by mouth daily (with breakfast).  frovatriptan (FROVA) 2.5 mg tablet Take 1 Tab by mouth once as needed for Migraine. Can take a second dose two hours later, max 2 daily    cetirizine (ZYRTEC) 10 mg tablet Take  by mouth daily.     ferrous gluconate (FERATE) 240 mg (27 mg iron) tablet Take 240 mg by mouth daily.  traMADol (ULTRAM-ER) 300 mg tablet Take 300 mg by mouth daily.  conjugated estrogens (PREMARIN) 1.25 mg tablet Take 1.25 mg by mouth daily.  gabapentin (NEURONTIN) 600 mg tablet Take 1 Tab by mouth three (3) times daily. (Patient taking differently: Take 600 mg by mouth five (5) times daily.)    butalbital-acetaminophen-caffeine (FIORICET, ESGIC) -40 mg per tablet Take 1 Tab by mouth every six (6) hours as needed for Pain. Max Daily Amount: 2 Tabs. No current facility-administered medications for this visit. Medical History  Past Medical History:   Diagnosis Date    Anxiety     Arthritis     back    Asthma     has not required tx in past 10 years    Chronic pain     lumbar    Dyspepsia and other specified disorders of function of stomach     Hypertension     Migraine     Nausea & vomiting      Review of Systems   Constitutional: Negative for chills and fever. HENT: Negative for ear pain. Eyes: Negative for pain and discharge. Respiratory: Negative for cough and hemoptysis. Cardiovascular: Negative for claudication. Gastrointestinal: Negative for constipation and diarrhea. Genitourinary: Negative for flank pain and hematuria. Musculoskeletal: Negative for back pain and myalgias. Skin: Negative for itching and rash. Neurological: Positive for headaches. Negative for tingling and tremors. Migraine headaches   Endo/Heme/Allergies: Negative for environmental allergies. Does not bruise/bleed easily. Psychiatric/Behavioral: Negative for depression and hallucinations. Exam:    Visit Vitals    /62    Pulse 77    Wt 169 lb (76.7 kg)    SpO2 98%    BMI 28.12 kg/m2      General: Well developed, well nourished.  anxious   Head: Normocephalic, atraumatic, anicteric sclera   Neck Normal ROM   Lungs: Normal effort   Ext: No pedal edema   Skin: Supple no rash     NeurologicExam:  Mental Status: Alert and oriented to person place and time   Speech: Fluent no aphasia or dysarthria. Cranial Nerves:  II - XII Intact   Motor:  Full and symmetric strength   Sensory:   Symmetric and intact . Gait:  Gait is balanced   Tremor:   No tremor noted. Cerebellar:  Coordination intact. Imaging    MRI Results (most recent):    Results from Hospital Encounter encounter on 04/29/11   MRI SPINE LUMBAR WITHOUT CONTRAST  **Final Report**      ICD Codes / Adm. Diagnosis: 724.2   / LBP (LOW BACK PAIN)    Examination:  MR L SPINE  CON  - [id number] - Apr 29 2011  6:55PM  Accession No:  [id number]  Reason:  lower back pain      REPORT:  CLINICAL HISTORY: Pain    INDICATION: Lower back pain    COMPARISON: None    TECHNIQUE: MR imaging of the lumbar spine was performed with sagittal T1,   T2, STIR;  axial T1, T2. Contrast was not administered. FINDINGS:    There is normal alignment of the lumbar spine. Vertebral body heights are   maintained. Marrow signal is normal.  The conus medullaris terminates at L1.     L1/2:  The spinal canal and neuroforamina are widely patent. L2/3:  Facet arthropathy and hypertrophy. Ligamentum flavum hypertrophy. The   spinal canal and neuroforamina are widely patent. L3/4:  Facet arthropathy and hypertrophy. Ligamentum flavum hypertrophy. spinal canal and neuroforamina are widely patent> . L4/5:  Moderate central disc protrusion. Mild facet arthropathy. Mild   central canal stenosis. This desiccation and loss of disc height. Facet   arthropathy and hypertrophy. Ligamentum flavum hypertrophy. The   neuroforamina are widely patent. L5/S1:  Mild central disc protrusion. The spinal canal and neuroforamina   are widely patent. The visualized musculature and intraperitoneal structures are within normal   limits       IMPRESSION:  Multilevel degenerative changes most severe at L4-L5 where there is moderate   central disc protrusion and mild central canal stenosis.           Interpreting/Reading Doctor: Jazmyne Corrales [employer] ([id number])  Transcribed:  on 05/01/2011  Approved: Meli Vigil [employer] ([id number])  05/01/2011          Distribution:  Attending Doctor: Caitlin Ospina              Lab Review    Lab Results   Component Value Date/Time    WBC 4.6 03/15/2016 03:03 PM    HCT 40.3 03/15/2016 03:03 PM    HGB 13.0 03/15/2016 03:03 PM    PLATELET 528 15/89/6156 03:03 PM       Lab Results   Component Value Date/Time    Sodium 142 03/15/2016 03:03 PM    Potassium 3.5 03/15/2016 03:03 PM    Chloride 105 03/15/2016 03:03 PM    CO2 28 03/15/2016 03:03 PM    Glucose 121 03/15/2016 03:03 PM    BUN 6 03/15/2016 03:03 PM    Creatinine 0.70 03/15/2016 03:03 PM    Calcium 8.8 03/15/2016 03:03 PM     Gay West was seen by synchronous (real-time) audio-video technology on 04/10/20. Consent:  She and/or her healthcare decision maker is aware that this patient-initiated Telehealth encounter is a billable service, with coverage as determined by her insurance carrier. She is aware that she may receive a bill and has provided verbal consent to proceed: Yes    I was in the office while conducting this encounter. Pursuant to the emergency declaration under the [facility], 1135 waiver authority and the [employer] Act, this Virtual  Visit was conducted, with patient's consent, to reduce the patient's risk of exposure to COVID-19 and provide continuity of care for an established patient. Services were provided through a video synchronous discussion virtually to substitute for in-person clinic visit.

## 2020-04-10 NOTE — PATIENT INSTRUCTIONS

## 2020-04-20 ENCOUNTER — PATIENT MESSAGE (OUTPATIENT)
Dept: NEUROLOGY | Age: 45
End: 2020-04-20

## 2020-04-21 NOTE — TELEPHONE ENCOUNTER
Per , it was last filled 3/20/2020 from 3/19/2020 script so the April script is still good. I called the pharmacy and they notified me that it is ready to .

## 2020-04-29 DIAGNOSIS — F41.9 ANXIETY: ICD-10-CM

## 2020-04-29 RX ORDER — BUSPIRONE HYDROCHLORIDE 10 MG/1
10 TABLET ORAL
Qty: 90 TAB | Refills: 1 | Status: SHIPPED | OUTPATIENT
Start: 2020-04-29 | End: 2020-06-24

## 2020-04-29 NOTE — TELEPHONE ENCOUNTER
Requested Prescriptions     Pending Prescriptions Disp Refills    busPIRone (BUSPAR) 10 mg tablet 90 Tab 1     Sig: Take 1 Tab by mouth three (3) times daily (with meals).      *Patient states she started taking 10mg and its working great

## 2020-04-29 NOTE — TELEPHONE ENCOUNTER
Requested Prescriptions     Pending Prescriptions Disp Refills    busPIRone (BUSPAR) 5 mg tablet 90 Tab 0     Sig: Take 1 Tab by mouth three (3) times daily (with meals). *Patient states she started taking 10mg and its working great.     Would like to continue taking 10mg

## 2020-05-02 DIAGNOSIS — F41.9 ANXIETY: ICD-10-CM

## 2020-05-02 RX ORDER — BUSPIRONE HYDROCHLORIDE 5 MG/1
TABLET ORAL
Qty: 90 TAB | Refills: 0 | Status: SHIPPED | OUTPATIENT
Start: 2020-05-02 | End: 2020-05-30

## 2020-05-04 DIAGNOSIS — F41.9 ANXIETY: ICD-10-CM

## 2020-05-05 ENCOUNTER — TELEPHONE (OUTPATIENT)
Dept: NEUROLOGY | Age: 45
End: 2020-05-05

## 2020-05-05 RX ORDER — ALPRAZOLAM 0.25 MG/1
TABLET ORAL
Qty: 20 TAB | Refills: 1 | OUTPATIENT
Start: 2020-05-05

## 2020-05-05 NOTE — TELEPHONE ENCOUNTER
Pt called, left vm - needs renewal of Aimovig authorization. Previous one  20. Sent via [employer] to [facility] [address] w/  4/10 virtual visit note. Spoke with the patient, she turned in her stool studies on 2/1.

## 2020-05-06 ENCOUNTER — TELEPHONE (OUTPATIENT)
Dept: NEUROLOGY | Age: 45
End: 2020-05-06

## 2020-05-06 NOTE — TELEPHONE ENCOUNTER
[employer] fax received - for additional info before approving Aimovig renewal.     Scanned their request in Media for Dr. Magalys Mckeon review and reply.      Time sensitive

## 2020-05-20 ENCOUNTER — VIRTUAL VISIT (OUTPATIENT)
Dept: NEUROLOGY | Age: 45
End: 2020-05-20

## 2020-05-20 DIAGNOSIS — F41.9 ANXIETY: ICD-10-CM

## 2020-05-20 RX ORDER — ALPRAZOLAM 0.5 MG/1
TABLET ORAL
Qty: 45 TAB | Refills: 2 | Status: SHIPPED | OUTPATIENT
Start: 2020-05-20 | End: 2020-08-10

## 2020-05-20 NOTE — PATIENT INSTRUCTIONS
Office Policies · Phone calls/patient messages: Please allow up to 24 hours for someone in the office to contact you about your call or message. Be mindful your provider may be out of the office or your message may require further review. We encourage you to use [employer] for your messages as this is a faster, more efficient way to communicate with our office · Medication Refills: 
Prescription medications require up to 48 business hours to process. We encourage you to use [employer] for your refills. For controlled medications: Please allow up to 72 business hours to process. Certain medications may require you to  a written prescription at our office. NO narcotic/controlled medications will be prescribed after 4pm Monday through Friday or on weekends · Form/Paperwork Completion: 
Please note there is a $25 fee for all paperwork completed by our providers. We ask that you allow 7-14 business days. Pre-payment is due prior to picking up/faxing the completed form. You may also download your forms to [employer] to have your doctor print off.

## 2020-05-20 NOTE — PROGRESS NOTES
Chief Complaint: migraines  Good migraine control. Feels caged and anxious Taking buspar 10mg three times a day. Has about one panic attack a week. Discussed the dangers of overuse of benzodiazepines. Discussed the importance of using only one source. Reassured her that though the pandemic is still ongoing there are many more recoveries than there are deaths. Discussed no medication tactics to combat anxiety Her biggest fear is exposing her family. Assesment and Plan  1. Intractable chronic migraine without aura and with status migrainosus  Continue  Aimovig  First  dose of Aimovig given in clinic  Patient has failed Topamax  Patient has failed zonisamide  Patient has failed amitriptyline  Patient has failed Prozac  Patient is currently on propranolol  Each treatment option was tried more than 2 months  Patient suffers chronic intractable migraine headaches  Migraine frequency is approximately 5 a month or less since starting aimovig    2. Attention deficit disorder (ADD) in adult  Continue  Methylphenidate hold if anxious    3. Essential hypertension  continue propranolol    4. Anxiety  continue buspar  Continue effexor  temporary increase xanax (temporary) will begin to wind it down next scrpt  Encouraged mindefillness          Allergies  Paxil [paroxetine hcl] and Erythromycin     Medications  Current Outpatient Prescriptions   Medication Sig    propranolol (INDERAL) 10 mg tablet Take 1 Tab by mouth two (2) times a day.  baclofen (LIORESAL) 10 mg tablet Take  by mouth four (4) times daily.  ibuprofen (MOTRIN) 600 mg tablet Take 1 Tab by mouth every six (6) hours as needed for Pain.  methylphenidate (RITALIN) 5 mg tablet Take 1 Tab (5 mg total) by mouth two (2) times a dayEarliest Fill Date: 4/18/17. Max Daily Amount: 10 mg    venlafaxine-SR (EFFEXOR-XR) 150 mg capsule Take 1 Cap by mouth daily (with breakfast).     frovatriptan (FROVA) 2.5 mg tablet Take 1 Tab by mouth once as needed for Migraine. Can take a second dose two hours later, max 2 daily    cetirizine (ZYRTEC) 10 mg tablet Take  by mouth daily.  ferrous gluconate (FERATE) 240 mg (27 mg iron) tablet Take 240 mg by mouth daily.  traMADol (ULTRAM-ER) 300 mg tablet Take 300 mg by mouth daily.  conjugated estrogens (PREMARIN) 1.25 mg tablet Take 1.25 mg by mouth daily.  gabapentin (NEURONTIN) 600 mg tablet Take 1 Tab by mouth three (3) times daily. (Patient taking differently: Take 600 mg by mouth five (5) times daily.)    butalbital-acetaminophen-caffeine (FIORICET, ESGIC) -40 mg per tablet Take 1 Tab by mouth every six (6) hours as needed for Pain. Max Daily Amount: 2 Tabs. No current facility-administered medications for this visit. Medical History  Past Medical History:   Diagnosis Date    Anxiety     Arthritis     back    Asthma     has not required tx in past 10 years    Chronic pain     lumbar    Dyspepsia and other specified disorders of function of stomach     Hypertension     Migraine     Nausea & vomiting      Review of Systems   Constitutional: Negative for chills and fever. HENT: Negative for ear pain. Eyes: Negative for pain and discharge. Respiratory: Negative for cough and hemoptysis. Cardiovascular: Negative for claudication. Gastrointestinal: Negative for constipation and diarrhea. Genitourinary: Negative for flank pain and hematuria. Musculoskeletal: Negative for back pain and myalgias. Skin: Negative for itching and rash. Neurological: Positive for headaches. Negative for tingling and tremors. Migraine headaches   Endo/Heme/Allergies: Negative for environmental allergies. Does not bruise/bleed easily. Psychiatric/Behavioral: Negative for depression and hallucinations. Exam:    Visit Vitals    /62    Pulse 77    Wt 169 lb (76.7 kg)    SpO2 98%    BMI 28.12 kg/m2      General: Well developed, well nourished.  anxious   Head: Normocephalic, atraumatic, anicteric sclera   Neck Normal ROM   Lungs: Normal effort   Ext: No pedal edema   Skin: Supple no rash     NeurologicExam:  Mental Status: Alert and oriented to person place and time   Speech: Fluent no aphasia or dysarthria. Cranial Nerves:  II - XII Intact   Motor:  Full and symmetric strength   Sensory:   Symmetric and intact . Gait:  Gait is balanced   Tremor:   No tremor noted. Cerebellar:  Coordination intact. Imaging    MRI Results (most recent):    Results from Hospital Encounter encounter on 04/29/11   MRI SPINE LUMBAR WITHOUT CONTRAST  **Final Report**      ICD Codes / Adm. Diagnosis: 724.2   / LBP (LOW BACK PAIN)    Examination:  MR BEE SPINE WO CON  - [id number] - Apr 29 2011  6:55PM  Accession No:  [id number]  Reason:  lower back pain      REPORT:  CLINICAL HISTORY: Pain    INDICATION: Lower back pain    COMPARISON: None    TECHNIQUE: MR imaging of the lumbar spine was performed with sagittal T1,   T2, STIR;  axial T1, T2. Contrast was not administered. FINDINGS:    There is normal alignment of the lumbar spine. Vertebral body heights are   maintained. Marrow signal is normal.  The conus medullaris terminates at L1.     L1/2:  The spinal canal and neuroforamina are widely patent. L2/3:  Facet arthropathy and hypertrophy. Ligamentum flavum hypertrophy. The   spinal canal and neuroforamina are widely patent. L3/4:  Facet arthropathy and hypertrophy. Ligamentum flavum hypertrophy. spinal canal and neuroforamina are widely patent> . L4/5:  Moderate central disc protrusion. Mild facet arthropathy. Mild   central canal stenosis. This desiccation and loss of disc height. Facet   arthropathy and hypertrophy. Ligamentum flavum hypertrophy. The   neuroforamina are widely patent. L5/S1:  Mild central disc protrusion. The spinal canal and neuroforamina   are widely patent.     The visualized musculature and intraperitoneal structures are within normal   limits IMPRESSION:  Multilevel degenerative changes most severe at L4-L5 where there is moderate   central disc protrusion and mild central canal stenosis. Interpreting/Reading Doctor: Julianne Ramirez ([id number])  Transcribed:  on 05/01/2011  Approved: Julianne Ramirez ([id number])  05/01/2011          Distribution:  Attending Doctor: Nupur nKight              Lab Review    Lab Results   Component Value Date/Time    WBC 4.6 03/15/2016 03:03 PM    HCT 40.3 03/15/2016 03:03 PM    HGB 13.0 03/15/2016 03:03 PM    PLATELET 537 59/08/0766 03:03 PM       Lab Results   Component Value Date/Time    Sodium 142 03/15/2016 03:03 PM    Potassium 3.5 03/15/2016 03:03 PM    Chloride 105 03/15/2016 03:03 PM    CO2 28 03/15/2016 03:03 PM    Glucose 121 03/15/2016 03:03 PM    BUN 6 03/15/2016 03:03 PM    Creatinine 0.70 03/15/2016 03:03 PM    Calcium 8.8 03/15/2016 03:03 PM     Gay West was seen by synchronous (real-time) audio-video technology on 05/22/20. Consent:  She and/or her healthcare decision maker is aware that this patient-initiated Telehealth encounter is a billable service, with coverage as determined by her insurance carrier. She is aware that she may receive a bill and has provided verbal consent to proceed: Yes    I was in the office while conducting this encounter. Pursuant to the emergency declaration under the [facility] waiver authority and the [employer] Act, this Virtual  Visit was conducted, with patient's consent, to reduce the patient's risk of exposure to COVID-19 and provide continuity of care for an established patient. Services were provided through a video synchronous discussion virtually to substitute for in-person clinic visit.

## 2020-05-30 DIAGNOSIS — F41.9 ANXIETY: ICD-10-CM

## 2020-05-30 RX ORDER — BUSPIRONE HYDROCHLORIDE 5 MG/1
TABLET ORAL
Qty: 90 TAB | Refills: 0 | Status: SHIPPED | OUTPATIENT
Start: 2020-05-30 | End: 2020-07-09 | Stop reason: ALTCHOICE

## 2020-06-08 DIAGNOSIS — G43.701 CHRONIC MIGRAINE WITHOUT AURA WITH STATUS MIGRAINOSUS, NOT INTRACTABLE: ICD-10-CM

## 2020-06-08 RX ORDER — BUTALBITAL, ACETAMINOPHEN AND CAFFEINE 50; 325; 40 MG/1; MG/1; MG/1
TABLET ORAL
Qty: 20 TAB | Refills: 4 | Status: SHIPPED | OUTPATIENT
Start: 2020-06-08 | End: 2020-08-10

## 2020-06-08 RX ORDER — BUTALBITAL, ACETAMINOPHEN AND CAFFEINE 50; 325; 40 MG/1; MG/1; MG/1
TABLET ORAL
Qty: 20 TAB | Refills: 5 | OUTPATIENT
Start: 2020-06-08

## 2020-06-21 DIAGNOSIS — G43.711 INTRACTABLE CHRONIC MIGRAINE WITHOUT AURA AND WITH STATUS MIGRAINOSUS: ICD-10-CM

## 2020-06-22 ENCOUNTER — TELEPHONE (OUTPATIENT)
Dept: NEUROLOGY | Age: 45
End: 2020-06-22

## 2020-06-24 DIAGNOSIS — F41.9 ANXIETY: ICD-10-CM

## 2020-06-24 RX ORDER — BUSPIRONE HYDROCHLORIDE 10 MG/1
TABLET ORAL
Qty: 90 TAB | Refills: 0 | Status: SHIPPED | OUTPATIENT
Start: 2020-06-24 | End: 2020-07-09 | Stop reason: SDUPTHER

## 2020-06-24 RX ORDER — PROPRANOLOL HYDROCHLORIDE 10 MG/1
TABLET ORAL
Qty: 270 TAB | Refills: 0 | Status: SHIPPED | OUTPATIENT
Start: 2020-06-24 | End: 2020-09-22

## 2020-06-25 ENCOUNTER — TELEPHONE (OUTPATIENT)
Dept: NEUROLOGY | Age: 45
End: 2020-06-25

## 2020-06-29 DIAGNOSIS — F98.8 ATTENTION DEFICIT DISORDER (ADD) IN ADULT: ICD-10-CM

## 2020-06-29 RX ORDER — METHYLPHENIDATE HYDROCHLORIDE 5 MG/1
5 TABLET ORAL 2 TIMES DAILY
Qty: 60 TAB | Refills: 0 | Status: SHIPPED | OUTPATIENT
Start: 2020-06-29 | End: 2020-07-29

## 2020-07-08 DIAGNOSIS — F98.8 ATTENTION DEFICIT DISORDER (ADD) IN ADULT: ICD-10-CM

## 2020-07-08 RX ORDER — METHYLPHENIDATE HYDROCHLORIDE 5 MG/1
5 TABLET ORAL 2 TIMES DAILY
Qty: 60 TAB | Refills: 0 | Status: SHIPPED | OUTPATIENT
Start: 2020-09-06 | End: 2020-07-09 | Stop reason: SDUPTHER

## 2020-07-08 RX ORDER — METHYLPHENIDATE HYDROCHLORIDE 5 MG/1
5 TABLET ORAL 2 TIMES DAILY
Qty: 60 TAB | Refills: 0 | Status: SHIPPED | OUTPATIENT
Start: 2020-07-08 | End: 2020-07-09 | Stop reason: SDUPTHER

## 2020-07-08 RX ORDER — METHYLPHENIDATE HYDROCHLORIDE 5 MG/1
5 TABLET ORAL 2 TIMES DAILY
Qty: 60 TAB | Refills: 0 | Status: SHIPPED | OUTPATIENT
Start: 2020-08-07 | End: 2020-07-09 | Stop reason: SDUPTHER

## 2020-07-09 ENCOUNTER — OFFICE VISIT (OUTPATIENT)
Dept: INTERNAL MEDICINE CLINIC | Age: 45
End: 2020-07-09

## 2020-07-09 VITALS
OXYGEN SATURATION: 98 % | DIASTOLIC BLOOD PRESSURE: 78 MMHG | BODY MASS INDEX: 30.79 KG/M2 | TEMPERATURE: 98.5 F | WEIGHT: 184.8 LBS | HEART RATE: 71 BPM | SYSTOLIC BLOOD PRESSURE: 116 MMHG | HEIGHT: 65 IN | RESPIRATION RATE: 20 BRPM

## 2020-07-09 DIAGNOSIS — G43.701 CHRONIC MIGRAINE WITHOUT AURA WITH STATUS MIGRAINOSUS, NOT INTRACTABLE: ICD-10-CM

## 2020-07-09 DIAGNOSIS — M48.062 SPINAL STENOSIS OF LUMBAR REGION WITH NEUROGENIC CLAUDICATION: ICD-10-CM

## 2020-07-09 DIAGNOSIS — F41.9 ANXIETY: ICD-10-CM

## 2020-07-09 DIAGNOSIS — I10 ESSENTIAL HYPERTENSION: Primary | ICD-10-CM

## 2020-07-09 LAB
A-G RATIO,AGRAT: 1.5 RATIO
ALBUMIN SERPL-MCNC: 4.3 G/DL (ref 3.9–5.4)
ALP SERPL-CCNC: 76 U/L (ref 38–126)
ALT SERPL-CCNC: 19 U/L (ref 0–35)
ANION GAP SERPL CALC-SCNC: 11 MMOL/L
AST SERPL W P-5'-P-CCNC: 24 U/L (ref 14–36)
BILIRUB SERPL-MCNC: 0.2 MG/DL (ref 0.2–1.3)
BILIRUB UR QL: NEGATIVE
BUN SERPL-MCNC: 12 MG/DL (ref 7–17)
BUN/CREATININE RATIO,BUCR: 20 RATIO
CALCIUM SERPL-MCNC: 9.8 MG/DL (ref 8.4–10.2)
CHLORIDE SERPL-SCNC: 100 MMOL/L (ref 98–107)
CHOL/HDL RATIO,CHHD: 2 RATIO (ref 0–4)
CHOLEST SERPL-MCNC: 213 MG/DL (ref 0–200)
CLARITY: CLEAR
CO2 SERPL-SCNC: 30 MMOL/L (ref 22–32)
COLOR UR: ABNORMAL
CREAT SERPL-MCNC: 0.6 MG/DL (ref 0.7–1.2)
ERYTHROCYTE [DISTWIDTH] IN BLOOD BY AUTOMATED COUNT: 12.6 %
GLOBULIN,GLOB: 2.8
GLUCOSE 24H UR-MRATE: NEGATIVE G/(24.H)
GLUCOSE SERPL-MCNC: 78 MG/DL (ref 65–105)
HCT VFR BLD AUTO: 37 % (ref 37–51)
HDLC SERPL-MCNC: 108 MG/DL (ref 35–130)
HGB BLD-MCNC: 12.2 G/DL (ref 12–18)
HGB UR QL STRIP: NEGATIVE
KETONES UR QL STRIP.AUTO: NEGATIVE
LDL/HDL RATIO,LDHD: 1 RATIO
LDLC SERPL CALC-MCNC: 77 MG/DL (ref 0–130)
LEUKOCYTE ESTERASE: NEGATIVE
LYMPHOCYTES ABSOLUTE: 1.4 K/UL (ref 0.6–4.1)
LYMPHOCYTES NFR BLD: 39.1 % (ref 10–58.5)
MCH RBC QN AUTO: 31.5 PG (ref 26–32)
MCHC RBC AUTO-ENTMCNC: 33 G/DL (ref 30–36)
MCV RBC AUTO: 95.5 FL (ref 80–97)
MONOCYTES ABS-DIF,2141: 0.3 K/UL (ref 0–1.8)
MONOCYTES NFR BLD: 8.9 % (ref 0.1–24)
NEUTROPHILS # BLD: 52 % (ref 37–92)
NEUTROPHILS ABS,2156: 1.9 K/UL (ref 2–7.8)
NITRITE UR QL STRIP.AUTO: NEGATIVE
PH UR STRIP: 7 [PH] (ref 5–7)
PLATELET # BLD AUTO: 396 K/UL (ref 140–440)
POTASSIUM SERPL-SCNC: 4.5 MMOL/L (ref 3.6–5)
PROT SERPL-MCNC: 7.1 G/DL (ref 6.3–8.2)
PROT UR STRIP-MCNC: NEGATIVE MG/DL
RBC # BLD AUTO: 3.87 M/UL (ref 4.2–6.3)
RBC #/AREA URNS HPF: 0 #/HPF
SODIUM SERPL-SCNC: 141 MMOL/L (ref 137–145)
SP GR UR REFRACTOMETRY: 1.01 (ref 1–1.03)
SQUAMOUS EPITHELIAL CELLS: ABNORMAL
TRIGL SERPL-MCNC: 142 MG/DL (ref 0–200)
UROBILINOGEN UR QL STRIP.AUTO: NEGATIVE
VLDLC SERPL CALC-MCNC: 28 MG/DL
WBC # BLD AUTO: 3.7 K/UL (ref 4.1–10.9)
WBC URNS QL MICRO: 0 #/HPF

## 2020-07-09 RX ORDER — BUSPIRONE HYDROCHLORIDE 10 MG/1
TABLET ORAL
Qty: 90 TAB | Refills: 3 | Status: SHIPPED | OUTPATIENT
Start: 2020-07-09 | End: 2020-10-27

## 2020-07-09 NOTE — PROGRESS NOTES
This note will not be viewable in [address] Saba Rae is a 45 y.o. female and presents with Follow Up Chronic Condition (6 mo fu) and Mole  . Subjective:  Ms. Rolf Dawn returns to the office today in follow-up of multiple medical problems. The patient has significant anxiety for which she is on BuSpar and as needed Xanax. She has been doing quite well on this regimen. The Xanax is used rarely. She has had no tolerance to the medication or exacerbation of symptoms. She has ADHD which is been controlled on Ritalin. This continues to work effectively for her and she has had no tolerance to the medication over time. She is able to sleep at night as the medication does wear off. The patient has a long history of migraine headaches. She has been followed by Dr. Cristino Moran. She is on the butalbital containing medication along with Effexor, Aimovig and baclofen. She has been doing quite well with her headache regimen. She is on Inderal for her hypertension. She tolerates this without fatigue, palpitations or dizziness. Denies headaches, numbness, tingling or focal neurological problems. She is on tramadol, meloxicam for her lumbar spinal stenosis. This is been doing reasonably well with this medication for some time and has had no recent exacerbations or problems related to her medication.     Past Medical History:   Diagnosis Date    Anxiety     Arthritis     back    Asthma     has not required tx in past 10 years    Chronic pain     lumbar    Dyspepsia and other specified disorders of function of stomach     Hypertension     Migraine     Nausea & vomiting      Past Surgical History:   Procedure Laterality Date    ABDOMEN SURGERY PROC UNLISTED      bowel resection 2012    HX GI  12/18/12     Laparoscopy, open right colectomy    HX GYN      hysterectomy    HX UROLOGICAL      bladder sling 2014 and jan 19 2015     Allergies   Allergen Reactions    Paxil [Paroxetine Hcl] Anaphylaxis    Erythromycin Nausea and Vomiting    Percocet [Oxycodone-Acetaminophen] Other (comments)     Personality changes     Current Outpatient Medications   Medication Sig Dispense Refill    busPIRone (BUSPAR) 10 mg tablet TAKE ONE TABLET BY MOUTH THREE TIMES A DAY WITH MEALS 90 Tab 3    methylphenidate HCl (RITALIN) 5 mg tablet Take 1 Tab by mouth two (2) times a day for 30 days. Max Daily Amount: 10 mg. 60 Tab 0    propranoloL (INDERAL) 10 mg tablet TAKE ONE TABLET BY MOUTH THREE TIMES A  Tab 0    butalbital-acetaminophen-caffeine (FIORICET, ESGIC) -40 mg per tablet TAKE ONE TABLET BY MOUTH EVERY 6 HOURS AS NEEDED FOR PAIN 20 Tab 4    ALPRAZolam (XANAX) 0.5 mg tablet Take one by mouth twice daily as needed for anxiety. This is a one month supply 45 Tab 2    traMADoL 300 mg TM24 Take  by mouth daily.  venlafaxine-SR (EFFEXOR-XR) 150 mg capsule TAKE ONE CAPSULE BY MOUTH DAILY WITH BREAKFAST 90 Cap 3    erenumab-aooe (AIMOVIG AUTOINJECTOR) 140 mg/mL injection 1 mL by SubCUTAneous route every thirty (30) days. 1 Each 11    meloxicam (MOBIC) 15 mg tablet Take 15 mg by mouth daily.  baclofen (LIORESAL) 10 mg tablet Take  by mouth four (4) times daily.  cetirizine (ZYRTEC) 10 mg tablet Take  by mouth daily.  ferrous gluconate (FERATE) 240 mg (27 mg iron) tablet Take 240 mg by mouth daily.  conjugated estrogens (PREMARIN) 1.25 mg tablet Take 1.25 mg by mouth daily.  gabapentin (NEURONTIN) 600 mg tablet Take 1 Tab by mouth three (3) times daily.  (Patient taking differently: Take 600 mg by mouth five (5) times daily.) 90 Tab 5     Social History     Socioeconomic History    Marital status:      Spouse name: Not on file    Number of children: Not on file    Years of education: Not on file    Highest education level: Not on file   Tobacco Use    Smoking status: Former Smoker    Smokeless tobacco: Never Used   Substance and Sexual Activity    Alcohol use: No    Drug use: No     Family History   Problem Relation Age of Onset    Hypertension Mother     Heart Disease Maternal Grandmother     Ovarian Cancer Maternal Grandmother         45s    Heart Disease Maternal Grandfather     Stroke Maternal Grandfather     Cancer Maternal Grandfather         colon, prostate    Cancer Paternal Grandmother         ovarian    Ovarian Cancer Paternal Grandmother         62s    Ovarian Cancer Maternal Aunt         35s   [place] Maintenance   Topic Date Due    DTaP/Tdap/Td series (1 - Tdap) 09/02/1998    PAP AKA CERVICAL CYTOLOGY  09/02/1998    Lipid Screen  09/02/2017    Influenza Age 9 to Adult  08/01/2020    Pneumococcal 0-64 years  Aged Out        Review of Systems  Constitutional: negative for fevers, chills, anorexia and weight loss  Eyes:   negative for visual disturbance and irritation  ENT:   negative for tinnitus,sore throat,nasal congestion,ear pain,hoarseness  Respiratory:  negative for cough, hemoptysis, dyspnea,wheezing  CV:   negative for chest pain, palpitations, lower extremity edema  GI:   negative for nausea, vomiting, diarrhea, abdominal pain,melena  Endo:               negative for polyuria,polydipsia,polyphagia,heat intolerance  Genitourinary: negative for frequency, dysuria and hematuria  Integumentary: negative for rash and pruritus  Hematologic:  negative for easy bruising and gum/nose bleeding  Musculoskel: Positive for chronic back pain  Neurological:  Positive for recurrent migraine headaches  Behavl/Psych: negative for feelings of anxiety, depression, mood changes  ROS otherwise negative      Objective:  Visit Vitals  /78 (BP 1 Location: Left arm, BP Patient Position: Sitting)   Pulse 71   Temp 98.5 °F (36.9 °C) (Oral)   Resp 20   Ht 5' 5\" (1.651 m)   Wt 184 lb 12.8 oz (83.8 kg)   SpO2 98%   BMI 30.75 kg/m²     Body mass index is 30.75 kg/m².     Physical Exam:   General appearance - alert, well appearing, and in no distress  Mental status - alert, oriented to person, place, and time  EYE-ANGELINA, EOMI,conjunctiva normal bilaterally, lids normal  ENT-ENT exam normal, no neck nodes or sinus tenderness  Nose - normal and patent, no erythema,  Or discharge   Mouth - mucous membranes moist, pharynx normal without lesions  Neck - supple, no significant adenopathy or bruit  Chest - clear to auscultation, no wheezes, rales or rhonchi. Heart - normal rate, regular rhythm, normal S1, S2, no murmurs, rubs, clicks or gallops   Abdomen - soft, nontender, nondistended, no masses or organomegaly  Lymph- no adenopathy palpable  Ext-peripheral pulses normal, no pedal edema, no clubbing or cyanosis  Skin-Warm and dry. no hyperpigmentation, vitiligo, or suspicious lesions  Neuro -alert, oriented, normal speech, no focal findings or movement disorder noted      Assessment/Plan:  Diagnoses and all orders for this visit:    Essential hypertension  -     COLLECTION VENOUS BLOOD,VENIPUNCTURE  -     CBC WITH AUTOMATED DIFF  -     LIPID PANEL  -     METABOLIC PANEL, COMPREHENSIVE  -     TSH 3RD GENERATION  -     URINALYSIS W/MICROSCOPIC    Anxiety  -     busPIRone (BUSPAR) 10 mg tablet; TAKE ONE TABLET BY MOUTH THREE TIMES A DAY WITH MEALS, Normal, Disp-90 Tab,R-3    Chronic migraine without aura with status migrainosus, not intractable    Spinal stenosis of lumbar region with neurogenic claudication        Other instructions: The patient's medications were reviewed and reconciled. No change in her current medical regimen will be made. A no added salt, prudent diet has been encouraged. Await results of multiple labs    Follow-up 6 months    Follow-up and Dispositions    · Return in about 6 months (around 1/9/2021). I have reviewed with the patient details of the assessment and plan and all questions were answered. Relevent patient education was performed. The most recent lab findings were reviewed with the patient.     An After Visit Summary was printed and given to the patient. Erick Moritz, MD    Please note that this dictation was completed with [employer], the computer voice recognition software. Quite often unanticipated grammatical, syntax, homophones, and other interpretive errors are inadvertently transcribed by the computer software. Please disregard these errors. Please excuse any errors that have escaped final proofreading.

## 2020-07-09 NOTE — PROGRESS NOTES
Savannah Garsia is a 45 y.o. female presenting for Follow Up Chronic Condition (6 mo fu)  . 1. Have you been to the ER, urgent care clinic since your last visit? Hospitalized since your last visit? No    2. Have you seen or consulted any other health care providers outside of the [address] [place] [address] since your last visit? Include any pap smears or colon screening. No    No flowsheet data found. No flowsheet data found.     3 most recent PHQ Screens 1/7/2020   Little interest or pleasure in doing things Not at all   Feeling down, depressed, irritable, or hopeless Not at all   Total Score PHQ 2 0       Medications Discontinued During This Encounter   Medication Reason    methylphenidate HCl (RITALIN) 5 mg tablet Duplicate Order    methylphenidate HCl (RITALIN) 5 mg tablet Duplicate Order    methylphenidate HCl (RITALIN) 5 mg tablet Duplicate Order

## 2020-07-10 LAB — TSH SERPL DL<=0.05 MIU/L-ACNC: 1.35 UIU/ML (ref 0.34–5.6)

## 2020-08-04 DIAGNOSIS — G43.701 CHRONIC MIGRAINE WITHOUT AURA WITH STATUS MIGRAINOSUS, NOT INTRACTABLE: ICD-10-CM

## 2020-08-04 DIAGNOSIS — F41.9 ANXIETY: ICD-10-CM

## 2020-08-10 RX ORDER — BUTALBITAL, ACETAMINOPHEN AND CAFFEINE 50; 325; 40 MG/1; MG/1; MG/1
TABLET ORAL
Qty: 20 TAB | Refills: 3 | Status: SHIPPED | OUTPATIENT
Start: 2020-08-10 | End: 2020-10-12 | Stop reason: SDUPTHER

## 2020-08-10 RX ORDER — ALPRAZOLAM 0.5 MG/1
TABLET ORAL
Qty: 45 TAB | Refills: 3 | Status: SHIPPED | OUTPATIENT
Start: 2020-08-10 | End: 2020-10-12

## 2020-08-24 ENCOUNTER — TELEPHONE (OUTPATIENT)
Dept: INTERNAL MEDICINE CLINIC | Age: 45
End: 2020-08-24

## 2020-08-24 DIAGNOSIS — L30.9 DERMATITIS: Primary | ICD-10-CM

## 2020-08-24 NOTE — TELEPHONE ENCOUNTER
Patient states she has a covid- rash on her right leg. She talked to Dr. Jordy Prado on 8/21/20. She would like to see a dermatologist soon.      [phone]

## 2020-08-25 ENCOUNTER — TELEPHONE (OUTPATIENT)
Dept: INTERNAL MEDICINE CLINIC | Age: 45
End: 2020-08-25

## 2020-08-25 NOTE — TELEPHONE ENCOUNTER
I am hoping that the dermatologist will confirm that this is a COVID rash or something else and then give us guidance as to treatment

## 2020-08-25 NOTE — TELEPHONE ENCOUNTER
EMERGENCY DEPARTMENT HISTORY AND PHYSICAL EXAM    8:34 PM  Date: 3/8/2019  Patient Name: Vincenzo Jacobs    History of Presenting Illness     Chief Complaint:   Chief Complaint   Patient presents with    Shortness of Breath    Chest Pain        History Provided By: Patient    Additional History (Context): Vincenzo Jacobs is a 49 y.o. female with hypertension, obesity, COPD and CHF who presents with difficulty breathing. Pt reports one week of worsening COPD symptoms including wheezing and trouble breathing. Her trigger is perfumes and smells. She has chest tightness \"from working so hard to breath\". Denies recent URI symptoms or change in sputum. States she is compliant with meds, but she is unsure of the names. Pt states she has a lot of pain at baseline and takes percocet or morphine for this. Admits to occasional cigarette. PCP: Shalini Knox MD    Duration:  Days  Timing:  Worsening  Location: Chest   Quality: Pressure and Tightness  Severity: Moderate  Modifying Factors: Breathing, walking  Associated Symptoms: wheezing    Past History     Past Medical History:  Past Medical History:   Diagnosis Date    Asthma     CHF (congestive heart failure) (HCC)     Chronic obstructive pulmonary disease (HCC)     Endocrine disease     thyroid issues    Gastrointestinal disorder     \"blockage in my stomach\"    Hypertension        Past Surgical History:  History reviewed. No pertinent surgical history. Family History:  History reviewed. No pertinent family history. Social History:  Social History     Tobacco Use    Smoking status: Never Smoker    Smokeless tobacco: Never Used   Substance Use Topics    Alcohol use: No     Comment: socially    Drug use: No       Allergies:  No Known Allergies    Review of Systems       Review of Systems   Constitutional: Positive for chills. Negative for fever. HENT: Negative for congestion, sinus pressure and sore throat. Eyes: Negative for pain and redness. Patient advised of Dr Juany Garland recommendation Respiratory: Positive for cough, chest tightness and shortness of breath. Cardiovascular: Negative for chest pain, palpitations and leg swelling. Gastrointestinal: Negative for abdominal pain, nausea and vomiting. Genitourinary: Positive for vaginal bleeding. Negative for dysuria and flank pain. Musculoskeletal: Negative for arthralgias and myalgias. Skin: Negative for rash. Neurological: Negative for weakness, light-headedness and headaches. Physical Exam     Patient Vitals for the past 12 hrs:   Temp Pulse Resp BP SpO2   03/08/19 1921     99 %   03/08/19 1855 98.7 °F (37.1 °C) 86 16 (!) 163/100 100 %           Physical Exam   Constitutional: She is oriented to person, place, and time. She appears well-developed and well-nourished. She appears distressed (mild resp distress). HENT:   Head: Normocephalic and atraumatic. Mouth/Throat: Oropharynx is clear and moist.   Eyes: Conjunctivae and EOM are normal. Pupils are equal, round, and reactive to light. Neck: Normal range of motion. Cardiovascular: Regular rhythm and normal heart sounds. tachycardic   Pulmonary/Chest: She is in respiratory distress (mild). She has no wheezes. She exhibits no tenderness. dcr airflow in all lung fields   Abdominal: Soft. Bowel sounds are normal. She exhibits mass (ventral hernia, nontender and without overlying skin changes). She exhibits no distension. There is no tenderness. Musculoskeletal: Normal range of motion. She exhibits no edema. Neurological: She is alert and oriented to person, place, and time. She exhibits normal muscle tone. Coordination normal.   Skin: Skin is warm and dry. She is not diaphoretic. Psychiatric: She has a normal mood and affect.  Her behavior is normal. Judgment and thought content normal.       Diagnostic Study Results     Labs -  Recent Results (from the past 12 hour(s))   CBC WITH AUTOMATED DIFF    Collection Time: 03/08/19  6:55 PM   Result Value Ref Range WBC 7.2 4.6 - 13.2 K/uL    RBC 4.86 4.20 - 5.30 M/uL    HGB 14.1 12.0 - 16.0 g/dL    HCT 43.1 35.0 - 45.0 %    MCV 88.7 74.0 - 97.0 FL    MCH 29.0 24.0 - 34.0 PG    MCHC 32.7 31.0 - 37.0 g/dL    RDW 13.6 11.6 - 14.5 %    PLATELET 784 490 - 561 K/uL    MPV 10.6 9.2 - 11.8 FL    NEUTROPHILS 53 40 - 73 %    LYMPHOCYTES 39 21 - 52 %    MONOCYTES 7 3 - 10 %    EOSINOPHILS 1 0 - 5 %    BASOPHILS 0 0 - 2 %    ABS. NEUTROPHILS 3.8 1.8 - 8.0 K/UL    ABS. LYMPHOCYTES 2.8 0.9 - 3.6 K/UL    ABS. MONOCYTES 0.5 0.05 - 1.2 K/UL    ABS. EOSINOPHILS 0.1 0.0 - 0.4 K/UL    ABS. BASOPHILS 0.0 0.0 - 0.1 K/UL    DF AUTOMATED     METABOLIC PANEL, COMPREHENSIVE    Collection Time: 03/08/19  6:55 PM   Result Value Ref Range    Sodium 145 136 - 145 mmol/L    Potassium 4.1 3.5 - 5.5 mmol/L    Chloride 109 (H) 100 - 108 mmol/L    CO2 31 21 - 32 mmol/L    Anion gap 5 3.0 - 18 mmol/L    Glucose 104 (H) 74 - 99 mg/dL    BUN 15 7.0 - 18 MG/DL    Creatinine 1.10 0.6 - 1.3 MG/DL    BUN/Creatinine ratio 14 12 - 20      GFR est AA >60 >60 ml/min/1.73m2    GFR est non-AA 52 (L) >60 ml/min/1.73m2    Calcium 8.5 8.5 - 10.1 MG/DL    Bilirubin, total 0.3 0.2 - 1.0 MG/DL    ALT (SGPT) 19 13 - 56 U/L    AST (SGOT) 14 (L) 15 - 37 U/L    Alk.  phosphatase 116 45 - 117 U/L    Protein, total 6.9 6.4 - 8.2 g/dL    Albumin 3.7 3.4 - 5.0 g/dL    Globulin 3.2 2.0 - 4.0 g/dL    A-G Ratio 1.2 0.8 - 1.7     CARDIAC PANEL,(CK, CKMB & TROPONIN)    Collection Time: 03/08/19  6:55 PM   Result Value Ref Range     26 - 192 U/L    CK - MB 4.3 (H) <3.6 ng/ml    CK-MB Index 3.8 0.0 - 4.0 %    Troponin-I, QT 0.04 0.0 - 0.045 NG/ML   EKG, 12 LEAD, INITIAL    Collection Time: 03/08/19  7:01 PM   Result Value Ref Range    Ventricular Rate 81 BPM    Atrial Rate 81 BPM    P-R Interval 126 ms    QRS Duration 84 ms    Q-T Interval 392 ms    QTC Calculation (Bezet) 455 ms    Calculated P Axis 54 degrees    Calculated R Axis 37 degrees    Calculated T Axis 1 degrees    Diagnosis Normal sinus rhythm  Septal infarct (cited on or before 23-AUG-2018)  T wave abnormality, consider anterolateral ischemia  Abnormal ECG  When compared with ECG of 23-AUG-2018 10:36,  Nonspecific T wave abnormality now evident in Inferior leads     POC G3    Collection Time: 03/08/19 10:11 PM   Result Value Ref Range    Device: ROOM AIR      FIO2 (POC) 21 %    pH (POC) 7.330 (L) 7.35 - 7.45      pCO2 (POC) 49.4 (H) 35.0 - 45.0 MMHG    pO2 (POC) 71 (L) 80 - 100 MMHG    HCO3 (POC) 26.1 (H) 22 - 26 MMOL/L    sO2 (POC) 93 92 - 97 %    Base deficit (POC) 1 mmol/L    Allens test (POC) YES      Total resp. rate 20      Site LEFT RADIAL      Patient temp. 98.6      Specimen type (POC) ARTERIAL      Performed by Ty Barnacle    EKG, 12 LEAD, SUBSEQUENT    Collection Time: 03/08/19 10:30 PM   Result Value Ref Range    Ventricular Rate 74 BPM    Atrial Rate 74 BPM    P-R Interval 122 ms    QRS Duration 76 ms    Q-T Interval 444 ms    QTC Calculation (Bezet) 492 ms    Calculated P Axis 55 degrees    Calculated R Axis 42 degrees    Calculated T Axis -49 degrees    Diagnosis       Normal sinus rhythm  Septal infarct (cited on or before 23-AUG-2018)  T wave abnormality, consider inferior ischemia  T wave abnormality, consider anterolateral ischemia  Abnormal ECG  When compared with ECG of 08-MAR-2019 19:01,  Inverted T waves have replaced nonspecific T wave abnormality in Inferior   leads     CARDIAC PANEL,(CK, CKMB & TROPONIN)    Collection Time: 03/08/19 10:52 PM   Result Value Ref Range     26 - 192 U/L    CK - MB 3.7 (H) <3.6 ng/ml    CK-MB Index 3.7 0.0 - 4.0 %    Troponin-I, QT 0.04 0.0 - 0.045 NG/ML       Radiologic Studies -   No orders to display       Medical Decision Making     ED Course: Progress Notes, Reevaluation, and Consults:  9:32 PM Pt sleeping comfortably on BiPAP with HR 80s, sats 94%. Airflow is improving in b/l lung fields. Taken off BiPAP, speaking in full sentences.  Will trial more albuterol and ABG. Pt wanted pain mediation for ABG. 10:28 PM ABG noted, CO2 49. Pt asking for food. 11:34 PM Sleeping, on NC. Sats 95%. Grunting slightly, offered BiPAP and refused. Consult placed to hospitalist, awaiting return phone call. 11:54 PM accepted by Dr. Mary Kate Casanova, hospitalist.  12:00 AM cardiologist Dr. Kelly Kim recommends heparin gtt, ASA and statin    Provider Notes (Medical Decision Making):   Dante Luz is a 49 y.o. female with hypertension, obesity, COPD and CHF who presents with difficulty breathing. The patient presents with dyspnea with a differential diagnosis of COPD, asthma, PE, ACS, pna, PTX.    - Initially on NRB at 15 with sats 100%, was able to maintain sats 97% on RA when this was removed. Tachypneic with dcr airflow throughout. Speaking in broken sentences. She refused CPAP from EMS. Received one neb from EMS. Initiated on BiPAP in ED, which she tolerated well. Airflow improving after duoneb x3, steroids, mag and continuous alb. - Pt is hospitalized frequently for COPD exacerbation.  - EKG sinus with dynamic T wave changes in precordial leads, present on 3 hour delta. Trop detectable, stable. States she had on and off chest pain this week, which she attributed to her COPD. Denies chest pain after COPD controlled and off bipap in the ED. Admit for further eval and possible stress testing. Given ASA, statin and heparin in the Ed after discussion with cards. - Post menopausal bleeding, denies bleeding in the ER. Not sexually active. Last period 10 years ago. H/H stable.     Procedures: None    Critical Care Time: N/A    Current Facility-Administered Medications   Medication Dose Route Frequency Provider Last Rate Last Dose    acetaminophen (TYLENOL) tablet 1,000 mg  1,000 mg Oral NOW Saran Walton MD        aspirin chewable tablet 324 mg  324 mg Oral NOW Saran Walton MD         Current Outpatient Medications   Medication Sig Dispense Refill    budesonide-formoterol (SYMBICORT) 160-4.5 mcg/actuation HFAA Take 2 Puffs by inhalation two (2) times a day. 1 Inhaler 1    tiotropium (SPIRIVA) 18 mcg inhalation capsule Take 1 Cap by inhalation daily. 30 Cap 1    budesonide (PULMICORT) 0.5 mg/2 mL nbsp 2 mL by Nebulization route two (2) times a day. 60 Each 1    albuterol (PROAIR HFA) 90 mcg/actuation inhaler Take 2 Puffs by inhalation every four (4) hours as needed for Wheezing. 1 Inhaler 0    albuterol (PROVENTIL VENTOLIN) 2.5 mg /3 mL (0.083 %) nebulizer solution 3 mL by Nebulization route every four (4) hours as needed for Wheezing. 30 Each 0    ALPRAZolam (XANAX) 1 mg tablet Take 1 Tab by mouth two (2) times a day. Max Daily Amount: 2 mg. 20 Tab 0    oxyCODONE-acetaminophen (PERCOCET 10)  mg per tablet Take 1 Tab by mouth every eight (8) hours as needed. Max Daily Amount: 3 Tabs. 20 Tab 0    amLODIPine (NORVASC) 10 mg tablet Take 1 Tab by mouth daily. 30 Tab 1    bumetanide (BUMEX) 0.5 mg tablet Take 1 Tab by mouth every fourty-eight (48) hours. 30 Tab 1    famotidine (PEPCID) 20 mg tablet Take 1 Tab by mouth daily. 30 Tab 1    FLUoxetine (PROZAC) 20 mg capsule Take 3 Caps by mouth daily. 90 Cap 1    montelukast (SINGULAIR) 10 mg tablet Take 1 Tab by mouth nightly. 30 Tab 1    arformoterol (BROVANA) 15 mcg/2 mL nebu neb solution 2 mL by Nebulization route two (2) times a day. 60 Vial 1    hydrALAZINE (APRESOLINE) 50 mg tablet Take 1 Tab by mouth three (3) times daily. 90 Tab 1        Vital Signs-Reviewed the patient's vital signs. Pulse Oximetry Analysis -  15L of O2 via Nonrebreather (Interpretation) Sats 100%  Cardiac Monitor:  Rate:  80s  Rhythm:  Normal Sinus Rhythm   EKG: Interpreted by the EP.    Time Interpreted: 1905   Rate: 81   Rhythm: Normal Sinus Rhythm    Interpretation:biphasic T waves in precordial leads   Comparison: yes    Records Reviewed: Nursing Notes, Old Medical Records, Previous electrocardiograms and Previous Laboratory Studies (Time of Review: 8:34 PM)  -I am the first provider for this patient.  -I reviewed the vital signs, available nursing notes, past medical history, past surgical history, family history and social history. Diagnosis     Clinical Impression:   1. COPD exacerbation ([address]    2. EKG abnormalities    3. Hypertension, unspecified type    4. Post-menopausal bleeding        Disposition: Admit    Follow-up Information    None             Medication List      ASK your doctor about these medications    * albuterol 2.5 mg /3 mL (0.083 %) nebulizer solution  Commonly known as:  PROVENTIL VENTOLIN  3 mL by Nebulization route every four (4) hours as needed for Wheezing. * albuterol 90 mcg/actuation inhaler  Commonly known as:  PROAIR HFA  Take 2 Puffs by inhalation every four (4) hours as needed for Wheezing. ALPRAZolam 1 mg tablet  Commonly known as:  XANAX  Take 1 Tab by mouth two (2) times a day. Max Daily Amount: 2 mg. amLODIPine 10 mg tablet  Commonly known as:  NORVASC  Take 1 Tab by mouth daily. arformoterol 15 mcg/2 mL Nebu neb solution  Commonly known as:  BROVANA  2 mL by Nebulization route two (2) times a day. budesonide 0.5 mg/2 mL Nbsp  Commonly known as:  PULMICORT  2 mL by Nebulization route two (2) times a day. budesonide-formoterol 160-4.5 mcg/actuation Hfaa  Commonly known as:  SYMBICORT  Take 2 Puffs by inhalation two (2) times a day. bumetanide 0.5 mg tablet  Commonly known as:  BUMEX  Take 1 Tab by mouth every fourty-eight (48) hours. famotidine 20 mg tablet  Commonly known as:  PEPCID  Take 1 Tab by mouth daily. FLUoxetine 20 mg capsule  Commonly known as:  PROzac  Take 3 Caps by mouth daily. hydrALAZINE 50 mg tablet  Commonly known as:  APRESOLINE  Take 1 Tab by mouth three (3) times daily. montelukast 10 mg tablet  Commonly known as:  SINGULAIR  Take 1 Tab by mouth nightly.      oxyCODONE-acetaminophen  mg per tablet  Commonly known as:  PERCOCET 10  Take 1 Tab by mouth every eight (8) hours as needed. Max Daily Amount: 3 Tabs. tiotropium 18 mcg inhalation capsule  Commonly known as:  SPIRIVA  Take 1 Cap by inhalation daily. * This list has 2 medication(s) that are the same as other medications prescribed for you. Read the directions carefully, and ask your doctor or other care provider to review them with you.              _______________________________    Attestations:        March 08, 2019 at 11:35 PM       Provider Attestation:      I personally performed the services described in the documentation, reviewed the documentation, as recorded by the scribe in my presence, and it accurately and completely records my words and actions.  March 08, 2019 at 11:35 PM - Catarina Figueredo MD    _______________________________

## 2020-09-16 DIAGNOSIS — G43.719 INTRACTABLE CHRONIC MIGRAINE WITHOUT AURA AND WITHOUT STATUS MIGRAINOSUS: ICD-10-CM

## 2020-09-16 RX ORDER — NARATRIPTAN 2.5 MG/1
TABLET ORAL
Qty: 8 TAB | Refills: 9 | Status: SHIPPED | OUTPATIENT
Start: 2020-09-16 | End: 2021-05-24 | Stop reason: SDUPTHER

## 2020-09-21 DIAGNOSIS — G43.711 INTRACTABLE CHRONIC MIGRAINE WITHOUT AURA AND WITH STATUS MIGRAINOSUS: ICD-10-CM

## 2020-09-22 RX ORDER — PROPRANOLOL HYDROCHLORIDE 10 MG/1
TABLET ORAL
Qty: 270 TAB | Refills: 0 | Status: SHIPPED | OUTPATIENT
Start: 2020-09-22 | End: 2020-12-22 | Stop reason: DRUGHIGH

## 2020-10-12 ENCOUNTER — VIRTUAL VISIT (OUTPATIENT)
Dept: NEUROLOGY | Age: 45
End: 2020-10-12
Payer: COMMERCIAL

## 2020-10-12 DIAGNOSIS — F41.9 ANXIETY: ICD-10-CM

## 2020-10-12 DIAGNOSIS — G43.701 CHRONIC MIGRAINE WITHOUT AURA WITH STATUS MIGRAINOSUS, NOT INTRACTABLE: Primary | ICD-10-CM

## 2020-10-12 DIAGNOSIS — F98.8 ATTENTION DEFICIT DISORDER (ADD) IN ADULT: ICD-10-CM

## 2020-10-12 PROCEDURE — 99215 OFFICE O/P EST HI 40 MIN: CPT | Performed by: PSYCHIATRY & NEUROLOGY

## 2020-10-12 RX ORDER — DIAZEPAM 5 MG/1
5 TABLET ORAL EVERY 12 HOURS
Qty: 60 TAB | Refills: 2 | Status: SHIPPED | OUTPATIENT
Start: 2020-10-12 | End: 2021-09-16

## 2020-10-12 RX ORDER — BUTALBITAL, ACETAMINOPHEN AND CAFFEINE 50; 325; 40 MG/1; MG/1; MG/1
TABLET ORAL
Qty: 20 TAB | Refills: 5 | Status: SHIPPED | OUTPATIENT
Start: 2020-10-12 | End: 2021-09-16

## 2020-10-12 NOTE — PROGRESS NOTES
Patient states that she has had 2 migraine in the past month and they go away in about 2 hours after taking her rescue medication. She states that she does need a refill on her Fioricet. Patient has not had any falls or hospitalizations. She feels that she is doing well on her migraine medication regimen.

## 2020-10-12 NOTE — PROGRESS NOTES
Chief Complaint: migraines  Struggles with anxiety. Advised her to seek  with phsychiatry. Wants to switch to diazepam from diazepam which has worked for her in the past. Agreed to temporarily prescribe this. Migraines are under control. Assesment and Plan  1. Intractable chronic migraine without aura and with status migrainosus  Continue  Aimovig  First  dose of Aimovig given in clinic  Patient has failed Topamax  Patient has failed zonisamide  Patient has failed amitriptyline  Patient has failed Prozac  Patient is currently on propranolol  Each treatment option was tried more than 2 months  Patient suffers chronic intractable migraine headaches  Migraine frequency is approximately 5 a month or less since starting aimovig    2. Attention deficit disorder (ADD) in adult  Continue  Methylphenidate hold if anxious    3. Essential hypertension  continue propranolol    4. Anxiety  continue buspar  Continue effexor  Diazepam bid          Allergies  Paxil [paroxetine hcl] and Erythromycin     Medications  Current Outpatient Prescriptions   Medication Sig    propranolol (INDERAL) 10 mg tablet Take 1 Tab by mouth two (2) times a day.  baclofen (LIORESAL) 10 mg tablet Take  by mouth four (4) times daily.  ibuprofen (MOTRIN) 600 mg tablet Take 1 Tab by mouth every six (6) hours as needed for Pain.  methylphenidate (RITALIN) 5 mg tablet Take 1 Tab (5 mg total) by mouth two (2) times a dayEarliest Fill Date: 4/18/17. Max Daily Amount: 10 mg    venlafaxine-SR (EFFEXOR-XR) 150 mg capsule Take 1 Cap by mouth daily (with breakfast).  frovatriptan (FROVA) 2.5 mg tablet Take 1 Tab by mouth once as needed for Migraine. Can take a second dose two hours later, max 2 daily    cetirizine (ZYRTEC) 10 mg tablet Take  by mouth daily.  ferrous gluconate (FERATE) 240 mg (27 mg iron) tablet Take 240 mg by mouth daily.  traMADol (ULTRAM-ER) 300 mg tablet Take 300 mg by mouth daily.     conjugated estrogens (PREMARIN) 1.25 mg tablet Take 1.25 mg by mouth daily.  gabapentin (NEURONTIN) 600 mg tablet Take 1 Tab by mouth three (3) times daily. (Patient taking differently: Take 600 mg by mouth five (5) times daily.)    butalbital-acetaminophen-caffeine (FIORICET, ESGIC) -40 mg per tablet Take 1 Tab by mouth every six (6) hours as needed for Pain. Max Daily Amount: 2 Tabs. No current facility-administered medications for this visit. Medical History  Past Medical History:   Diagnosis Date    Anxiety     Arthritis     back    Asthma     has not required tx in past 10 years    Chronic pain     lumbar    Dyspepsia and other specified disorders of function of stomach     Hypertension     Migraine     Nausea & vomiting      Review of Systems   Constitutional: Negative for chills and fever. HENT: Negative for ear pain. Eyes: Negative for pain and discharge. Respiratory: Negative for cough and hemoptysis. Cardiovascular: Negative for claudication. Gastrointestinal: Negative for constipation and diarrhea. Genitourinary: Negative for flank pain and hematuria. Musculoskeletal: Negative for back pain and myalgias. Skin: Negative for itching and rash. Neurological: Positive for headaches. Negative for tingling and tremors. Migraine headaches   Endo/Heme/Allergies: Negative for environmental allergies. Does not bruise/bleed easily. Psychiatric/Behavioral: Negative for depression and hallucinations. Exam:       General: Well developed, well nourished. anxious   Head: Normocephalic, atraumatic, anicteric sclera   Neck Normal ROM   Lungs: Normal effort   Ext: No pedal edema   Skin: Supple no rash     NeurologicExam:  Mental Status: Alert and oriented to person place and time   Speech: Fluent no aphasia or dysarthria. Cranial Nerves:  II - XII Intact   Motor:  Full and symmetric strength   Sensory:   Symmetric and intact .    Gait:  Gait is balanced   Tremor:   No tremor noted.   Cerebellar:  Coordination intact. Imaging    MRI Results (most recent):    Results from Hospital Encounter encounter on 04/29/11   MRI SPINE LUMBAR WITHOUT CONTRAST  **Final Report**      ICD Codes / Adm. Diagnosis: 724.2   / LBP (LOW BACK PAIN)    Examination:  MR CRISTAL COCHRAN CON  - [id number] - Apr 29 2011  6:55PM  Accession No:  [id number]  Reason:  lower back pain      REPORT:  CLINICAL HISTORY: Pain    INDICATION: Lower back pain    COMPARISON: None    TECHNIQUE: MR imaging of the lumbar spine was performed with sagittal T1,   T2, STIR;  axial T1, T2. Contrast was not administered. FINDINGS:    There is normal alignment of the lumbar spine. Vertebral body heights are   maintained. Marrow signal is normal.  The conus medullaris terminates at L1.     L1/2:  The spinal canal and neuroforamina are widely patent. L2/3:  Facet arthropathy and hypertrophy. Ligamentum flavum hypertrophy. The   spinal canal and neuroforamina are widely patent. L3/4:  Facet arthropathy and hypertrophy. Ligamentum flavum hypertrophy. spinal canal and neuroforamina are widely patent> . L4/5:  Moderate central disc protrusion. Mild facet arthropathy. Mild   central canal stenosis. This desiccation and loss of disc height. Facet   arthropathy and hypertrophy. Ligamentum flavum hypertrophy. The   neuroforamina are widely patent. L5/S1:  Mild central disc protrusion. The spinal canal and neuroforamina   are widely patent. The visualized musculature and intraperitoneal structures are within normal   limits       IMPRESSION:  Multilevel degenerative changes most severe at L4-L5 where there is moderate   central disc protrusion and mild central canal stenosis.           Interpreting/Reading Doctor: Bruno Quiroz ([id number])  Transcribed:  on 05/01/2011  Approved: Bruno Quiroz ([id number])  05/01/2011          Distribution:  Attending Doctor: Keyona Benavides              Lab Review    Lab Results   Component Value Date/Time WBC 4.6 03/15/2016 03:03 PM    HCT 40.3 03/15/2016 03:03 PM    HGB 13.0 03/15/2016 03:03 PM    PLATELET 903 47/93/8960 03:03 PM       Lab Results   Component Value Date/Time    Sodium 142 03/15/2016 03:03 PM    Potassium 3.5 03/15/2016 03:03 PM    Chloride 105 03/15/2016 03:03 PM    CO2 28 03/15/2016 03:03 PM    Glucose 121 03/15/2016 03:03 PM    BUN 6 03/15/2016 03:03 PM    Creatinine 0.70 03/15/2016 03:03 PM    Calcium 8.8 03/15/2016 03:03 PM     Gay West was seen by synchronous (real-time) audio-video technology on 10/12/20. Consent:  She and/or her healthcare decision maker is aware that this patient-initiated Telehealth encounter is a billable service, with coverage as determined by her insurance carrier. She is aware that she may receive a bill and has provided verbal consent to proceed: Yes    I was in the office while conducting this encounter. Pursuant to the emergency declaration under the [facility], [facility]5 waiver authority and the [employer] and [employer] General Act, this Virtual  Visit was conducted, with patient's consent, to reduce the patient's risk of exposure to COVID-19 and provide continuity of care for an established patient. Services were provided through a video synchronous discussion virtually to substitute for in-person clinic visit.         checked

## 2020-10-17 ENCOUNTER — TELEPHONE (OUTPATIENT)
Dept: NEUROLOGY | Age: 45
End: 2020-10-17

## 2020-10-17 NOTE — TELEPHONE ENCOUNTER
Patient called asking for refills on fioricet. Record revieiw shows she is using between 40-50 pills a month. Last prescription filled 10/12 with five refills which should last her to 4/10/2021. Spoke with pharmacy and patient and clarified directions and shared concerns. Both pharmacy and patient expressed understanding.

## 2020-10-19 DIAGNOSIS — G43.701 CHRONIC MIGRAINE WITHOUT AURA WITH STATUS MIGRAINOSUS, NOT INTRACTABLE: ICD-10-CM

## 2020-10-20 RX ORDER — BUTALBITAL, ACETAMINOPHEN AND CAFFEINE 50; 325; 40 MG/1; MG/1; MG/1
TABLET ORAL
Qty: 20 TAB | Refills: 5 | OUTPATIENT
Start: 2020-10-20

## 2020-10-27 DIAGNOSIS — F41.9 ANXIETY: ICD-10-CM

## 2020-10-27 RX ORDER — BUSPIRONE HYDROCHLORIDE 10 MG/1
TABLET ORAL
Qty: 90 TAB | Refills: 2 | Status: SHIPPED | OUTPATIENT
Start: 2020-10-27 | End: 2021-01-11

## 2020-11-12 DIAGNOSIS — G43.719 INTRACTABLE CHRONIC MIGRAINE WITHOUT AURA AND WITHOUT STATUS MIGRAINOSUS: ICD-10-CM

## 2020-11-12 RX ORDER — ERENUMAB-AOOE 140 MG/ML
INJECTION, SOLUTION SUBCUTANEOUS
Qty: 1 SYRINGE | Refills: 11 | Status: SHIPPED | OUTPATIENT
Start: 2020-11-12 | End: 2021-12-07

## 2020-11-16 ENCOUNTER — TELEPHONE (OUTPATIENT)
Dept: NEUROLOGY | Age: 45
End: 2020-11-16

## 2020-11-16 DIAGNOSIS — F98.8 ATTENTION DEFICIT DISORDER (ADD) IN ADULT: ICD-10-CM

## 2020-11-22 DIAGNOSIS — F98.8 ATTENTION DEFICIT DISORDER (ADD) IN ADULT: Primary | ICD-10-CM

## 2020-11-22 RX ORDER — METHYLPHENIDATE HYDROCHLORIDE 5 MG/1
5 TABLET ORAL 2 TIMES DAILY
Qty: 60 TAB | Refills: 0 | OUTPATIENT
Start: 2020-11-22 | End: 2020-12-22

## 2020-11-22 RX ORDER — METHYLPHENIDATE HYDROCHLORIDE 5 MG/1
5 TABLET ORAL 2 TIMES DAILY
Qty: 60 TAB | Refills: 0 | Status: SHIPPED | OUTPATIENT
Start: 2020-12-22 | End: 2021-01-21

## 2020-11-22 RX ORDER — METHYLPHENIDATE HYDROCHLORIDE 5 MG/1
5 TABLET ORAL 2 TIMES DAILY
Qty: 60 TAB | Refills: 0 | Status: SHIPPED | OUTPATIENT
Start: 2020-11-22 | End: 2020-12-22

## 2020-11-22 RX ORDER — METHYLPHENIDATE HYDROCHLORIDE 5 MG/1
5 TABLET ORAL 2 TIMES DAILY
Qty: 60 TAB | Refills: 0 | Status: SHIPPED | OUTPATIENT
Start: 2021-01-21 | End: 2021-02-20

## 2020-12-22 RX ORDER — PROPRANOLOL HYDROCHLORIDE 60 MG/1
60 CAPSULE, EXTENDED RELEASE ORAL DAILY
Qty: 30 CAP | Refills: 5 | Status: SHIPPED | OUTPATIENT
Start: 2020-12-22 | End: 2021-07-06

## 2021-01-11 DIAGNOSIS — F41.9 ANXIETY: ICD-10-CM

## 2021-01-11 RX ORDER — BUSPIRONE HYDROCHLORIDE 10 MG/1
TABLET ORAL
Qty: 90 TAB | Refills: 1 | Status: SHIPPED | OUTPATIENT
Start: 2021-01-11 | End: 2021-03-11

## 2021-02-21 DIAGNOSIS — F41.9 ANXIETY: ICD-10-CM

## 2021-02-22 RX ORDER — VENLAFAXINE HYDROCHLORIDE 150 MG/1
CAPSULE, EXTENDED RELEASE ORAL
Qty: 90 CAP | Refills: 2 | Status: SHIPPED | OUTPATIENT
Start: 2021-02-22 | End: 2021-11-08

## 2021-02-25 DIAGNOSIS — F98.8 ATTENTION DEFICIT DISORDER (ADD) IN ADULT: Primary | ICD-10-CM

## 2021-02-25 RX ORDER — METHYLPHENIDATE HYDROCHLORIDE 5 MG/1
5 TABLET ORAL 2 TIMES DAILY
Qty: 60 TAB | Refills: 0 | Status: SHIPPED | OUTPATIENT
Start: 2021-02-25 | End: 2021-03-27

## 2021-02-25 RX ORDER — METHYLPHENIDATE HYDROCHLORIDE 5 MG/1
5 TABLET ORAL 2 TIMES DAILY
Qty: 60 TAB | Refills: 0 | Status: SHIPPED | OUTPATIENT
Start: 2021-03-27 | End: 2021-04-26

## 2021-02-25 RX ORDER — METHYLPHENIDATE HYDROCHLORIDE 5 MG/1
5 TABLET ORAL 2 TIMES DAILY
Qty: 60 TAB | Refills: 0 | Status: SHIPPED | OUTPATIENT
Start: 2021-04-27 | End: 2021-05-27

## 2021-03-05 DIAGNOSIS — F41.9 ANXIETY: ICD-10-CM

## 2021-03-11 RX ORDER — BUSPIRONE HYDROCHLORIDE 10 MG/1
TABLET ORAL
Qty: 90 TAB | Refills: 0 | Status: SHIPPED | OUTPATIENT
Start: 2021-03-11 | End: 2021-04-06

## 2021-03-11 NOTE — TELEPHONE ENCOUNTER
Last Refill: 1-11-21  Last Visit: Visit date not found   Next Visit: Visit date not found     Requested Prescriptions     Pending Prescriptions Disp Refills    busPIRone (BUSPAR) 10 mg tablet [Pharmacy Med Name: busPIRone HCL 10 MG TABLET] 90 Tab 0     Sig: TAKE ONE TABLET BY MOUTH THREE TIMES A DAY WITH A MEAL

## 2021-04-06 DIAGNOSIS — F41.9 ANXIETY: ICD-10-CM

## 2021-04-06 RX ORDER — BUSPIRONE HYDROCHLORIDE 10 MG/1
TABLET ORAL
Qty: 90 TAB | Refills: 0 | Status: SHIPPED | OUTPATIENT
Start: 2021-04-06 | End: 2021-05-05

## 2021-05-05 DIAGNOSIS — F41.9 ANXIETY: ICD-10-CM

## 2021-05-05 RX ORDER — BUSPIRONE HYDROCHLORIDE 10 MG/1
TABLET ORAL
Qty: 90 TAB | Refills: 0 | Status: SHIPPED | OUTPATIENT
Start: 2021-05-05 | End: 2021-06-01

## 2021-05-24 DIAGNOSIS — G43.719 INTRACTABLE CHRONIC MIGRAINE WITHOUT AURA AND WITHOUT STATUS MIGRAINOSUS: Primary | ICD-10-CM

## 2021-05-24 DIAGNOSIS — G43.719 INTRACTABLE CHRONIC MIGRAINE WITHOUT AURA AND WITHOUT STATUS MIGRAINOSUS: ICD-10-CM

## 2021-05-24 DIAGNOSIS — F98.8 ATTENTION DEFICIT DISORDER (ADD) IN ADULT: ICD-10-CM

## 2021-05-24 RX ORDER — METHYLPHENIDATE HYDROCHLORIDE 5 MG/1
5 TABLET ORAL 2 TIMES DAILY
Qty: 60 TABLET | Refills: 0 | Status: SHIPPED | OUTPATIENT
Start: 2021-05-27 | End: 2021-06-26

## 2021-05-24 RX ORDER — METHYLPHENIDATE HYDROCHLORIDE 5 MG/1
5 TABLET ORAL 2 TIMES DAILY
Qty: 60 TABLET | Refills: 0 | OUTPATIENT
Start: 2021-05-24 | End: 2021-06-23

## 2021-05-24 RX ORDER — METHYLPHENIDATE HYDROCHLORIDE 5 MG/1
5 TABLET ORAL 2 TIMES DAILY
Qty: 60 TABLET | Refills: 0 | Status: SHIPPED | OUTPATIENT
Start: 2021-07-28 | End: 2021-08-27

## 2021-05-24 RX ORDER — NARATRIPTAN 2.5 MG/1
2.5 TABLET ORAL
Qty: 8 TABLET | Refills: 9 | Status: SHIPPED | OUTPATIENT
Start: 2021-05-24 | End: 2021-12-29

## 2021-05-24 RX ORDER — METHYLPHENIDATE HYDROCHLORIDE 5 MG/1
5 TABLET ORAL 2 TIMES DAILY
Qty: 60 TABLET | Refills: 0 | Status: SHIPPED | OUTPATIENT
Start: 2021-06-27 | End: 2021-07-27

## 2021-06-01 DIAGNOSIS — F41.9 ANXIETY: ICD-10-CM

## 2021-06-01 RX ORDER — BUSPIRONE HYDROCHLORIDE 10 MG/1
TABLET ORAL
Qty: 90 TABLET | Refills: 0 | Status: SHIPPED | OUTPATIENT
Start: 2021-06-01 | End: 2021-06-30

## 2021-06-30 DIAGNOSIS — F41.9 ANXIETY: ICD-10-CM

## 2021-06-30 RX ORDER — BUSPIRONE HYDROCHLORIDE 10 MG/1
TABLET ORAL
Qty: 90 TABLET | Refills: 0 | Status: SHIPPED | OUTPATIENT
Start: 2021-06-30 | End: 2021-09-03 | Stop reason: SDUPTHER

## 2021-07-06 RX ORDER — PROPRANOLOL HYDROCHLORIDE 60 MG/1
CAPSULE, EXTENDED RELEASE ORAL
Qty: 90 CAPSULE | Refills: 4 | Status: SHIPPED | OUTPATIENT
Start: 2021-07-06 | End: 2022-10-17 | Stop reason: SDUPTHER

## 2021-09-03 DIAGNOSIS — F41.9 ANXIETY: ICD-10-CM

## 2021-09-03 RX ORDER — BUSPIRONE HYDROCHLORIDE 10 MG/1
10 TABLET ORAL 3 TIMES DAILY
Qty: 90 TABLET | Refills: 0 | Status: SHIPPED | OUTPATIENT
Start: 2021-09-03 | End: 2021-09-27 | Stop reason: SDUPTHER

## 2021-09-03 NOTE — TELEPHONE ENCOUNTER
PCP: Ginna Humphreys MD    Last appt: 7/9/2020  Future Appointments   Date Time Provider Paula Brink   9/16/2021  3:00 PM Riri Deal MD [facility]       Requested Prescriptions     Pending Prescriptions Disp Refills    busPIRone (BUSPAR) 10 mg tablet 90 Tablet 0         Other Comments:

## 2021-09-16 ENCOUNTER — OFFICE VISIT (OUTPATIENT)
Dept: INTERNAL MEDICINE CLINIC | Age: 46
End: 2021-09-16
Payer: COMMERCIAL

## 2021-09-16 VITALS
OXYGEN SATURATION: 99 % | RESPIRATION RATE: 16 BRPM | HEART RATE: 70 BPM | DIASTOLIC BLOOD PRESSURE: 72 MMHG | WEIGHT: 186.8 LBS | HEIGHT: 65 IN | BODY MASS INDEX: 31.12 KG/M2 | SYSTOLIC BLOOD PRESSURE: 120 MMHG | TEMPERATURE: 97.9 F

## 2021-09-16 DIAGNOSIS — G43.701 CHRONIC MIGRAINE WITHOUT AURA WITH STATUS MIGRAINOSUS, NOT INTRACTABLE: Chronic | ICD-10-CM

## 2021-09-16 DIAGNOSIS — I10 ESSENTIAL HYPERTENSION: Chronic | ICD-10-CM

## 2021-09-16 DIAGNOSIS — F98.8 ATTENTION DEFICIT DISORDER (ADD) WITHOUT HYPERACTIVITY: Chronic | ICD-10-CM

## 2021-09-16 DIAGNOSIS — Z11.59 NEED FOR HEPATITIS C SCREENING TEST: ICD-10-CM

## 2021-09-16 DIAGNOSIS — F41.9 ANXIETY: Chronic | ICD-10-CM

## 2021-09-16 DIAGNOSIS — Z00.00 ROUTINE PHYSICAL EXAMINATION: Primary | ICD-10-CM

## 2021-09-16 LAB
ALBUMIN SERPL-MCNC: 3.6 G/DL (ref 3.5–5)
ALBUMIN/GLOB SERPL: 0.9 {RATIO} (ref 1.1–2.2)
ALP SERPL-CCNC: 69 U/L (ref 45–117)
ALT SERPL-CCNC: 29 U/L (ref 12–78)
ANION GAP SERPL CALC-SCNC: 5 MMOL/L (ref 5–15)
APPEARANCE UR: CLEAR
AST SERPL-CCNC: 18 U/L (ref 15–37)
BACTERIA URNS QL MICRO: NEGATIVE /HPF
BASOPHILS # BLD: 0 K/UL (ref 0–0.1)
BASOPHILS NFR BLD: 1 % (ref 0–1)
BILIRUB SERPL-MCNC: 0.2 MG/DL (ref 0.2–1)
BILIRUB UR QL: NEGATIVE
BUN SERPL-MCNC: 11 MG/DL (ref 6–20)
BUN/CREAT SERPL: 15 (ref 12–20)
CALCIUM SERPL-MCNC: 9.3 MG/DL (ref 8.5–10.1)
CHLORIDE SERPL-SCNC: 102 MMOL/L (ref 97–108)
CHOLEST SERPL-MCNC: 227 MG/DL
CO2 SERPL-SCNC: 30 MMOL/L (ref 21–32)
COLOR UR: NORMAL
CREAT SERPL-MCNC: 0.72 MG/DL (ref 0.55–1.02)
DIFFERENTIAL METHOD BLD: ABNORMAL
EOSINOPHIL # BLD: 0.1 K/UL (ref 0–0.4)
EOSINOPHIL NFR BLD: 2 % (ref 0–7)
EPITH CASTS URNS QL MICRO: NORMAL /LPF
ERYTHROCYTE [DISTWIDTH] IN BLOOD BY AUTOMATED COUNT: 12 % (ref 11.5–14.5)
GLOBULIN SER CALC-MCNC: 3.8 G/DL (ref 2–4)
GLUCOSE SERPL-MCNC: 74 MG/DL (ref 65–100)
GLUCOSE UR STRIP.AUTO-MCNC: NEGATIVE MG/DL
HCT VFR BLD AUTO: 39.3 % (ref 35–47)
HCV AB SERPL QL IA: NONREACTIVE
HDLC SERPL-MCNC: 80 MG/DL
HDLC SERPL: 2.8 {RATIO} (ref 0–5)
HGB BLD-MCNC: 12 G/DL (ref 11.5–16)
HGB UR QL STRIP: NEGATIVE
HYALINE CASTS URNS QL MICRO: NORMAL /LPF (ref 0–5)
IMM GRANULOCYTES # BLD AUTO: 0 K/UL
IMM GRANULOCYTES NFR BLD AUTO: 0 %
KETONES UR QL STRIP.AUTO: NEGATIVE MG/DL
LDLC SERPL CALC-MCNC: 110.6 MG/DL (ref 0–100)
LEUKOCYTE ESTERASE UR QL STRIP.AUTO: NEGATIVE
LYMPHOCYTES # BLD: 1.6 K/UL (ref 0.8–3.5)
LYMPHOCYTES NFR BLD: 42 % (ref 12–49)
MCH RBC QN AUTO: 29.9 PG (ref 26–34)
MCHC RBC AUTO-ENTMCNC: 30.5 G/DL (ref 30–36.5)
MCV RBC AUTO: 98 FL (ref 80–99)
MONOCYTES # BLD: 0.1 K/UL (ref 0–1)
MONOCYTES NFR BLD: 3 % (ref 5–13)
NEUTS SEG # BLD: 1.9 K/UL (ref 1.8–8)
NEUTS SEG NFR BLD: 52 % (ref 32–75)
NITRITE UR QL STRIP.AUTO: NEGATIVE
NRBC # BLD: 0 K/UL (ref 0–0.01)
NRBC BLD-RTO: 0 PER 100 WBC
PH UR STRIP: 7.5 [PH] (ref 5–8)
PLATELET # BLD AUTO: 373 K/UL (ref 150–400)
PMV BLD AUTO: 9.3 FL (ref 8.9–12.9)
POTASSIUM SERPL-SCNC: 4.8 MMOL/L (ref 3.5–5.1)
PROT SERPL-MCNC: 7.4 G/DL (ref 6.4–8.2)
PROT UR STRIP-MCNC: NEGATIVE MG/DL
RBC # BLD AUTO: 4.01 M/UL (ref 3.8–5.2)
RBC #/AREA URNS HPF: NORMAL /HPF (ref 0–5)
RBC MORPH BLD: ABNORMAL
SODIUM SERPL-SCNC: 137 MMOL/L (ref 136–145)
SP GR UR REFRACTOMETRY: 1.01 (ref 1–1.03)
TRIGL SERPL-MCNC: 182 MG/DL (ref ?–150)
TSH SERPL DL<=0.05 MIU/L-ACNC: 1.82 UIU/ML (ref 0.36–3.74)
UA: UC IF INDICATED,UAUC: NORMAL
UROBILINOGEN UR QL STRIP.AUTO: 0.2 EU/DL (ref 0.2–1)
VLDLC SERPL CALC-MCNC: 36.4 MG/DL
WBC # BLD AUTO: 3.7 K/UL (ref 3.6–11)
WBC URNS QL MICRO: NORMAL /HPF (ref 0–4)

## 2021-09-16 PROCEDURE — 99396 PREV VISIT EST AGE 40-64: CPT | Performed by: INTERNAL MEDICINE

## 2021-09-16 NOTE — PROGRESS NOTES
Diego Tello is a 42 y.o. female presenting for Complete Physical  .     1. Have you been to the ER, urgent care clinic since your last visit? Hospitalized since your last visit? No    2. Have you seen or consulted any other health care providers outside of the [address] [place] [address] since your last visit? Include any pap smears or colon screening. Pain management    No flowsheet data found. No flowsheet data found. 3 most recent PHQ Screens 9/16/2021   Little interest or pleasure in doing things Not at all   Feeling down, depressed, irritable, or hopeless Not at all   Total Score PHQ 2 0       There are no discontinued medications.

## 2021-09-16 NOTE — PROGRESS NOTES
Subjective:     42 y.o. female for annual Comprehensive personal healthcare examination. History of present illness: This patient has multiple medical problems. These include:     Santos Gallegos is a 61-year-old female who presents to the office today for complete checkup. The patient has hypertension which she is on the beta-blocker for. Patient notes no dizziness, fatigue or palpitations. She denies numbness tingling or focal neurological problems. She has chronic migraines and is followed by neurology. In addition they are also treating her for attention deficit disorder. She is on Ritalin for this. Her medications have been working well as her headaches have been under control on her attention deficit manageable. She denies any long-term side effects related to her medication. She is currently taking BuSpar for anxiety. This is helped tremendously. She has had no breakthrough symptoms and no long-term side effects related to her medication. The patient has generalized pain and is followed at [facility] for this. She currently is on tramadol as well as gabapentin. This seems to be working effectively for her. Patient notes that her sister was recently diagnosed with lupus. One of her physicians oralia a C-reactive protein which was 5.6 and a sed rate which was 25.   She would like to be seen by a rheumatologist in order to have lupus or other rheumatological disorder ruled out especially in view of her problems with chronic migraines, etc.    Patient Active Problem List   Diagnosis Code    S/P right colectomy Z90.49    Colitis K52.9    Lactic acidosis E87.2    History of intussusception Z87.19    Essential hypertension I10    Chronic migraine without aura with status migrainosus, not intractable G43.701    Spinal stenosis of lumbar region with neurogenic claudication M48.062    Anxiety F41.9    Attention deficit disorder (ADD) F98.8      Past Medical History:   Diagnosis Date    Anxiety  Arthritis     back    Asthma     has not required tx in past 10 years    Chronic pain     lumbar    Dyspepsia and other specified disorders of function of stomach     Hypertension     Migraine     Nausea & vomiting      Past Surgical History:   Procedure Laterality Date    HX GI  12/18/12     Laparoscopy, open right colectomy    HX GYN      hysterectomy    HX UROLOGICAL      bladder sling 2014 and jan 19 2015    [place] ABDOMEN SURGERY PROC UNLISTED      bowel resection 2012     Allergies   Allergen Reactions    Paxil [Paroxetine Hcl] Anaphylaxis    Erythromycin Nausea and Vomiting    Percocet [Oxycodone-Acetaminophen] Other (comments)     Personality changes     Current Outpatient Medications   Medication Sig Dispense Refill    busPIRone (BUSPAR) 10 mg tablet Take 1 Tablet by mouth three (3) times daily. 90 Tablet 0    methylphenidate HCl (RITALIN) 5 mg tablet Take 1 Tablet by mouth two (2) times a day for 30 days. Max Daily Amount: 10 mg. 60 Tablet 0    propranolol LA (INDERAL LA) 60 mg SR capsule TAKE ONE CAPSULE BY MOUTH DAILY 90 Capsule 4    venlafaxine-SR (EFFEXOR-XR) 150 mg capsule TAKE ONE CAPSULE BY MOUTH EVERY MORNING WITH BREAKFAST 90 Cap 2    Aimovig Autoinjector 140 mg/mL injection INJECT 1 ML SUBCUTANEOUSLY EVERY 30 DAYS 1 Syringe 11    traMADoL 300 mg TM24 Take  by mouth daily.  meloxicam (MOBIC) 15 mg tablet Take 15 mg by mouth daily.  cetirizine (ZYRTEC) 10 mg tablet Take  by mouth daily.  conjugated estrogens (PREMARIN) 1.25 mg tablet Take 1.25 mg by mouth daily.  gabapentin (NEURONTIN) 600 mg tablet Take 1 Tab by mouth three (3) times daily.  (Patient taking differently: Take 600 mg by mouth five (5) times daily.) 90 Tab 5     Social History     Socioeconomic History    Marital status:      Spouse name: Not on file    Number of children: Not on file    Years of education: Not on file    Highest education level: Not on file   Tobacco Use    Smoking status: Former Smoker    Smokeless tobacco: Never Used   Vaping Use    Vaping Use: Never used   Substance and Sexual Activity    Alcohol use: No    Drug use: No     Social Determinants of Health     Financial Resource Strain:     Difficulty of Paying Living Expenses:    Food Insecurity:     Worried About Running Out of Food in the Last Year:     [address] in the Last Year:    Transportation Needs:     Lack of Transportation (Medical):  Lack of Transportation (Non-Medical):    Physical Activity:     Days of Exercise per Week:     Minutes of Exercise per Session:    Stress:     Feeling of Stress :    Social Connections:     Frequency of Communication with Friends and Family:     Frequency of Social Gatherings with Friends and Family:     Attends Caodaism Services:     Active Member of Clubs or Organizations:     Attends Club or Organization Meetings:     Marital Status:      Family History   Problem Relation Age of Onset    Hypertension Mother     Heart Disease Maternal Grandmother     Ovarian Cancer Maternal Grandmother         45s    Heart Disease Maternal Grandfather     Stroke Maternal Grandfather     Cancer Maternal Grandfather         colon, prostate    Cancer Paternal Grandmother         ovarian    Ovarian Cancer Paternal Grandmother         62s    Ovarian Cancer Maternal Aunt         [address] [place] [address] Maintenance   Topic Date Due    Hepatitis C Screening  Never done    DTaP/Tdap/Td series (1 - Tdap) Never done    Flu Vaccine (1) 09/01/2021    Lipid Screen  07/09/2025    COVID-19 Vaccine  Completed    Pneumococcal 0-64 years  Aged Out       Review of Systems  Constitutional:  She denies fever, weight loss, sweats or fatigue. HEENT:  No blurred or double vision, headache or dizziness. No difficulty with swallowing, taste, speech or smell. Respiratory:  No cough, wheezing or shortness of breath. No sputum production.   Cardiac:  Denies chest pain, palpitations, unexplained indigestion, syncope, edema, PND or orthopnea. GI:  No changes in bowel movements, no abdominal pain, no bloating, anorexia, nausea, vomiting or heartburn. :  No frequency or dysuria. Denies incontinence. Extremities:  No joint pain, stiffness or swelling. Skin:  No recent rashes or mole changes. Neurological:  No numbness, tingling, burning paresthesias or loss of motor strength. No syncope, dizziness, frequent headaches or memory loss. ROS otherwise negative      Objective:     Vitals:    09/16/21 1458   BP: 120/72   Pulse: 70   Resp: 16   Temp: 97.9 °F (36.6 °C)   TempSrc: Oral   SpO2: 99%   Weight: 186 lb 12.8 oz (84.7 kg)   Height: 5' 5\" (1.651 m)   PainSc:   0 - No pain     Body mass index is 31.09 kg/m². Physical Examination:   General Appearance:  Well-developed, well-nourished, no acute distress. Vision:  Deferred to ophthalmologist.       HEENT:    Ears:  The TMs and ear canals were clear. Eyes:  The pupillary responses were normal.  Extraocular muscle function intact. Lids and conjunctiva not injected. No conjunctiva redness or drainage. Pharynx:  Clear with teeth in good repair. No masses were noted. Neck:  Supple without thyromegaly or adenopathy. No JVD noted. No carotid bruits. Lungs:  Clear to auscultation and percussion. Cardiac:  Regular rate and rhythm without murmur. PMI not displaced. No gallop, rub or click. Abdomen:  Flat, soft, non-tender without palpable organomegaly or mass. No pulsatile mass was felt, and no bruit was heard. Bowel sounds were active. Breasts:  Deferred to GYN  GYN: Deferred to GYN    Rectal exam: Deferred to GYN    Extremities:  No clubbing, cyanosis or edema. Pulses:  Dorsalis pedis and posterior tibial pulses felt without difficulty. Skin:  No rash or unusual mole changes noted. Lymph Nodes:  None felt in the cervical, supraclavicular, axillary or inguinal region.   Neurological:  Cranial nerves II-XII grossly intact. Motor strength 5/5. DTRs 2+ and symmetric. Station and gait normal.  Physical exam otherwise negative        Assessment/Plan:     Diagnoses and all orders for this visit:    Routine physical examination  -     COLLECTION VENOUS BLOOD,VENIPUNCTURE  -     CBC WITH AUTOMATED DIFF; Future  -     HEPATITIS C AB; Future  -     LIPID PANEL; Future  -     METABOLIC PANEL, COMPREHENSIVE; Future  -     TSH 3RD GENERATION; Future  -     URINALYSIS W/ REFLEX CULTURE; Future    Essential hypertension    Chronic migraine without aura with status migrainosus, not intractable  -     REFERRAL TO RHEUMATOLOGY    Attention deficit disorder (ADD) without hyperactivity    Anxiety    Need for hepatitis C screening test  -     HEPATITIS C AB; Future        Other instructions: The patient's medications were reviewed and reconciled. No change in her current medical regimen will be made. A no added salt, prudent diet is encouraged    Weight loss recommended with body mass index of 31.1    Health maintenance issues were reviewed and she is up-to-date on influenza vaccination. Await results of multiple labs    Referral to rheumatology will be made per her request    Follow-up yearly    Follow-up and Dispositions    · Return in about 1 year (around 9/16/2022). Terrance Pierson MD     Please note that this dictation was completed with [employer], the [employer] voice recognition software. Quite often unanticipated grammatical, syntax, homophones, and other interpretive errors are inadvertently transcribed by the computer software. Please disregard these errors. Please excuse any errors that have escaped final proofreading.

## 2021-09-27 DIAGNOSIS — F41.9 ANXIETY: ICD-10-CM

## 2021-09-27 RX ORDER — BUSPIRONE HYDROCHLORIDE 10 MG/1
10 TABLET ORAL 3 TIMES DAILY
Qty: 90 TABLET | Refills: 3 | Status: SHIPPED | OUTPATIENT
Start: 2021-09-27 | End: 2022-01-22

## 2021-09-27 NOTE — TELEPHONE ENCOUNTER
Last Refill: 9-3-21  Last Visit: 9/16/2021   Next Visit: Visit date not found     Requested Prescriptions     Pending Prescriptions Disp Refills    busPIRone (BUSPAR) 10 mg tablet 90 Tablet 3     Sig: Take 1 Tablet by mouth three (3) times daily.

## 2021-09-30 ENCOUNTER — TRANSCRIBE ORDER (OUTPATIENT)
Dept: SCHEDULING | Age: 46
End: 2021-09-30

## 2021-09-30 DIAGNOSIS — Z12.31 SCREENING MAMMOGRAM, ENCOUNTER FOR: Primary | ICD-10-CM

## 2021-10-09 ENCOUNTER — HOSPITAL ENCOUNTER (EMERGENCY)
Age: 46
Discharge: HOME | End: 2021-10-09
Attending: EMERGENCY MEDICINE
Payer: COMMERCIAL

## 2021-10-09 VITALS
WEIGHT: 170 LBS | HEART RATE: 66 BPM | TEMPERATURE: 98.3 F | BODY MASS INDEX: 28.32 KG/M2 | HEIGHT: 65 IN | OXYGEN SATURATION: 100 % | SYSTOLIC BLOOD PRESSURE: 108 MMHG | DIASTOLIC BLOOD PRESSURE: 68 MMHG | RESPIRATION RATE: 18 BRPM

## 2021-10-09 DIAGNOSIS — R33.9 URINARY RETENTION: Primary | ICD-10-CM

## 2021-10-09 LAB
APPEARANCE UR: CLEAR
BILIRUB UR QL: NEGATIVE
COLOR UR: YELLOW
GLUCOSE UR STRIP.AUTO-MCNC: NEGATIVE MG/DL
HGB UR QL STRIP: NEGATIVE
KETONES UR QL STRIP.AUTO: NEGATIVE MG/DL
LEUKOCYTE ESTERASE UR QL STRIP.AUTO: NEGATIVE
NITRITE UR QL STRIP.AUTO: NEGATIVE
PH UR STRIP: 7 [PH] (ref 5–8)
PROT UR STRIP-MCNC: NEGATIVE MG/DL
SP GR UR REFRACTOMETRY: 1.01 (ref 1–1.03)
UROBILINOGEN UR QL STRIP.AUTO: 0.2 EU/DL (ref 0.2–1)

## 2021-10-09 PROCEDURE — 81003 URINALYSIS AUTO W/O SCOPE: CPT

## 2021-10-09 PROCEDURE — 99283 EMERGENCY DEPT VISIT LOW MDM: CPT

## 2021-10-09 PROCEDURE — 87086 URINE CULTURE/COLONY COUNT: CPT

## 2021-10-09 RX ORDER — BACLOFEN 10 MG/1
10 TABLET ORAL 3 TIMES DAILY
Qty: 8 TABLET | Refills: 0 | Status: SHIPPED | OUTPATIENT
Start: 2021-10-09 | End: 2021-10-12

## 2021-10-09 NOTE — ED NOTES
Pt presents with c/o urinary retention since yesterday. She reports minor symptoms (strong odor of urine and \"taking longer to pee each time\") that began two weeks ago. She reports same issue in 2014 that ended up being bladder prolapse for which she had mesh placed. Mckeon catheter placed using sterile technique by Vashti Brian RN. Approximately 500 ml of clear yellow urine drained at this time. Catheter secured in place using securement device. Discussed pt status with provider. Provided pt with warm blankets per her request.   at bedside.

## 2021-10-09 NOTE — ED PROVIDER NOTES
HPI     Past Medical History:   Diagnosis Date    Anxiety     Arthritis     back    Asthma     has not required tx in past 10 years    Chronic pain     lumbar    Dyspepsia and other specified disorders of function of stomach     Hypertension     Migraine     Nausea & vomiting        Past Surgical History:   Procedure Laterality Date    HX GI  12/18/12     Laparoscopy, open right colectomy    HX GYN      hysterectomy    HX UROLOGICAL      bladder sling 2014 and jan 19 2015    [place] ABDOMEN SURGERY PROC UNLISTED      bowel resection 2012         Family History:   Problem Relation Age of Onset    Hypertension Mother     Heart Disease Maternal Grandmother     Ovarian Cancer Maternal Grandmother         45s    Heart Disease Maternal Grandfather     Stroke Maternal Grandfather     Cancer Maternal Grandfather         colon, prostate    Cancer Paternal Grandmother         ovarian    Ovarian Cancer Paternal Grandmother         62s    Ovarian Cancer Maternal Aunt         35s    Breast Cancer Cousin        Social History     Socioeconomic History    Marital status:      Spouse name: Not on file    Number of children: Not on file    Years of education: Not on file    Highest education level: Not on file   Occupational History    Not on file   Tobacco Use    Smoking status: Former Smoker    Smokeless tobacco: Never Used   Vaping Use    Vaping Use: Never used   Substance and Sexual Activity    Alcohol use: No    Drug use: No    Sexual activity: Not on file   Other Topics Concern    Not on file   Social History Narrative    Not on file     Social Determinants of Health     Financial Resource Strain:     Difficulty of Paying Living Expenses:    Food Insecurity:     Worried About [address] in the Last Year:     [address] in the Last Year:    Transportation Needs:     Lack of Transportation (Medical):      Lack of Transportation (Non-Medical):    Physical Activity:     Days of Exercise per Week:     Minutes of Exercise per Session:    Stress:     Feeling of Stress :    Social Connections:     Frequency of Communication with Friends and Family:     Frequency of Social Gatherings with Friends and Family:     Attends Jain Services:     Active Member of Clubs or Organizations:     Attends Club or Organization Meetings:     Marital Status:    Intimate Partner Violence:     Fear of Current or Ex-Partner:     Emotionally Abused:     Physically Abused:     Sexually Abused:           ALLERGIES: Paxil [paroxetine hcl], Erythromycin, and Percocet [oxycodone-acetaminophen]    Review of Systems    Vitals:    10/09/21 1437   BP: 108/68   Pulse: 66   Resp: 18   Temp: 98.3 °F (36.8 °C)   SpO2: 100%   Weight: 77.1 kg (170 lb)   Height: 5' 5\" (1.651 m)            Physical Exam     MDM       Procedures

## 2021-10-09 NOTE — ED TRIAGE NOTES
Pt reports issues with urination, States she has not urinated since yesterday. Pt reports hx urinary issues with a prolapsed bladder in 2014, had a bladder mesh placed.

## 2021-10-09 NOTE — ED PROVIDER NOTES
EMERGENCY DEPARTMENT HISTORY AND PHYSICAL EXAM    5:03 PM      Date: 10/9/2021  Patient Name: Юлия     History of Presenting Illness     Chief Complaint   Patient presents with    Urinary Pain         History Provided By: Patient  Location/Duration/Severity/Modifying factors   Patient is a 81-year-old female with a history of migraine headache, bladder prolapse requiring sling surgery, chronic pain in pain management, anxiety, dyspepsia, presents emergency department with an inability to urinate since yesterday. The patient notes that she had a problem with urinary retention prior to having her surgery and then over the last 2 weeks has had having intermittent episodes of hesitancy and difficulty urinating. The patient noticed yesterday she had could not go and then overnight she could not pass any urine. This morning the symptoms continue to worsen and they family had to go to a  and got to the point that she was becoming increasingly uncomfortable and came to the emergency department with pelvic pain and inability to urinate. The patient denies any fevers or chills. The patient only notes that she had a change in her medication was that she stopped baclofen approximately 2 months ago. Since then she has noticed that she is having some difficulty urinating. The patient has follow-up with her GYN and primary doctors in [place]. Patient is a former smoker, denies alcohol use, denies any drug use. The patient is in town for a .          PCP: Esperanza Prakash MD    Current Outpatient Medications   Medication Sig Dispense Refill    baclofen (LIORESAL) 10 mg tablet Take 1 Tablet by mouth three (3) times daily for 3 days. 8 Tablet 0    busPIRone (BUSPAR) 10 mg tablet Take 1 Tablet by mouth three (3) times daily.  90 Tablet 3    propranolol LA (INDERAL LA) 60 mg SR capsule TAKE ONE CAPSULE BY MOUTH DAILY 90 Capsule 4    venlafaxine-SR (EFFEXOR-XR) 150 mg capsule TAKE ONE CAPSULE BY MOUTH EVERY MORNING WITH BREAKFAST 90 Cap 2    Aimovig Autoinjector 140 mg/mL injection INJECT 1 ML SUBCUTANEOUSLY EVERY 30 DAYS 1 Syringe 11    traMADoL 300 mg TM24 Take  by mouth daily.  meloxicam (MOBIC) 15 mg tablet Take 15 mg by mouth daily.  cetirizine (ZYRTEC) 10 mg tablet Take  by mouth daily.  conjugated estrogens (PREMARIN) 1.25 mg tablet Take 1.25 mg by mouth daily.  gabapentin (NEURONTIN) 600 mg tablet Take 1 Tab by mouth three (3) times daily. (Patient taking differently: Take 600 mg by mouth five (5) times daily.) 90 Tab 5       Past History     Past Medical History:  Past Medical History:   Diagnosis Date    Anxiety     Arthritis     back    Asthma     has not required tx in past 10 years    Chronic pain     lumbar    Dyspepsia and other specified disorders of function of stomach     Hypertension     Migraine     Nausea & vomiting        Past Surgical History:  Past Surgical History:   Procedure Laterality Date    HX GI  12/18/12     Laparoscopy, open right colectomy    HX GYN      hysterectomy    HX UROLOGICAL      bladder sling 2014 and jan 19 2015    [place] ABDOMEN SURGERY PROC UNLISTED      bowel resection 2012       Family History:  Family History   Problem Relation Age of Onset    Hypertension Mother     Heart Disease Maternal Grandmother     Ovarian Cancer Maternal Grandmother         45s    Heart Disease Maternal Grandfather     Stroke Maternal Grandfather     Cancer Maternal Grandfather         colon, prostate    Cancer Paternal Grandmother         ovarian    Ovarian Cancer Paternal Grandmother         62s    Ovarian Cancer Maternal Aunt         30s    Breast Cancer Cousin        Social History:  Social History     Tobacco Use    Smoking status: Former Smoker    Smokeless tobacco: Never Used   Vaping Use    Vaping Use: Never used   Substance Use Topics    Alcohol use: No    Drug use: No       Allergies:   Allergies   Allergen Reactions    Paxil [Paroxetine Hcl] Anaphylaxis    Erythromycin Nausea and Vomiting    Percocet [Oxycodone-Acetaminophen] Other (comments)     Personality changes         Review of Systems       Review of Systems   Constitutional: Negative for activity change, fatigue and fever. HENT: Negative for congestion and rhinorrhea. Eyes: Negative for visual disturbance. Respiratory: Negative for shortness of breath. Cardiovascular: Negative for chest pain and palpitations. Gastrointestinal: Negative for abdominal pain, diarrhea, nausea and vomiting. Genitourinary: Positive for difficulty urinating and pelvic pain. Negative for dysuria and hematuria. Musculoskeletal: Negative for back pain. Skin: Negative for rash. Neurological: Negative for dizziness, weakness and light-headedness. All other systems reviewed and are negative. Physical Exam     Visit Vitals  /68 (BP 1 Location: Left upper arm, BP Patient Position: Sitting)   Pulse 66   Temp 98.3 °F (36.8 °C)   Resp 18   Ht 5' 5\" (1.651 m)   Wt 77.1 kg (170 lb)   SpO2 100%   BMI 28.29 kg/m²         Physical Exam  Vitals and nursing note reviewed. Constitutional:       General: She is not in acute distress. Appearance: She is well-developed. HENT:      Head: Normocephalic and atraumatic. Right Ear: External ear normal.      Left Ear: External ear normal.      Nose: Nose normal.   Eyes:      General: No scleral icterus. Conjunctiva/sclera: Conjunctivae normal.      Pupils: Pupils are equal, round, and reactive to light. Neck:      Thyroid: No thyromegaly. Vascular: No JVD. Trachea: No tracheal deviation. Cardiovascular:      Rate and Rhythm: Normal rate and regular rhythm. Heart sounds: Normal heart sounds. No murmur heard. No friction rub. No gallop. Pulmonary:      Effort: Pulmonary effort is normal.      Breath sounds: Normal breath sounds. Chest:      Chest wall: No tenderness.    Abdominal: General: Bowel sounds are normal. There is no distension. Palpations: Abdomen is soft. Tenderness: There is no abdominal tenderness. There is no guarding or rebound. Genitourinary:     Comments: Catheter noted with clear yellow urine drainage  Musculoskeletal:         General: No tenderness. Normal range of motion. Cervical back: Normal range of motion and neck supple. Lymphadenopathy:      Cervical: No cervical adenopathy. Skin:     General: Skin is warm and dry. Neurological:      Mental Status: She is alert and oriented to person, place, and time. Cranial Nerves: No cranial nerve deficit. Coordination: Coordination normal.      Comments: No sensory loss, Gait normal, Motor 5/5   Psychiatric:         Behavior: Behavior normal.         Thought Content: Thought content normal.         Judgment: Judgment normal.      Comments: Supportive and insightful  at the bedside           Diagnostic Study Results     Labs -  Recent Results (from the past 12 hour(s))   URINALYSIS W/ RFLX MICROSCOPIC    Collection Time: 10/09/21  4:15 PM   Result Value Ref Range    Color YELLOW      Appearance CLEAR      Specific gravity 1.008 1.005 - 1.030      pH (UA) 7.0 5.0 - 8.0      Protein Negative NEG mg/dL    Glucose Negative NEG mg/dL    Ketone Negative NEG mg/dL    Bilirubin Negative NEG      Blood Negative NEG      Urobilinogen 0.2 0.2 - 1.0 EU/dL    Nitrites Negative NEG      Leukocyte Esterase Negative NEG         Radiologic Studies -   No orders to display         Medical Decision Making   I am the first provider for this patient. I reviewed the vital signs, available nursing notes, past medical history, past surgical history, family history and social history. Vital Signs-Reviewed the patient's vital signs. Records Reviewed: Nursing Notes and Old Medical Records (Time of Review: 5:03 PM)    ED Course: Progress Notes, Reevaluation, and Consults:      Workup and recommendations were reviewed with the patient and all questions were answered. The patient understands the plan and will proceed with close outpatient care. I have encouraged the patient to return if at all worsened or concerned. Keyona Michaud, DO 5:08 PM      Provider Notes (Medical Decision Making):   MDM  Number of Diagnoses or Management Options  Urinary retention  Diagnosis management comments: Patient is a 81-year-old female with a history of chronic pain, migraine headache, urinary retention requiring pelvic sling procedure due to prolapse, presents emergency department with progressive difficulty urinating and inability to urinate for the last 24 hours. The patient had approximately 500 cc of urine in her bladder and after the Mckeon catheter was placed felt much better. Patient's urine is reassuring she is afebrile and she is on multiple medications including opioid pain medications. I suspect this accommodation of her postsurgical anatomy in the setting of her polypharmacy that is driving some of the symptoms however appears she needs these medications from the chronic care standpoint and will defer to her primary care team regarding the meds that she needs and does not need. In addition she is asking for baclofen as she stopped it several weeks ago and her urinary symptoms worsen so we will give her a limited supply of baclofen until she discusses this with her primary doctor. The patient's  will be driving her home agrees with the plan as well. Procedures        Diagnosis     Clinical Impression:   1.  Urinary retention        Disposition: DC    Follow-up Information     Follow up With Specialties Details Why Contact Info    Your urogynecologist  In 2 days      [facility] EMERGENCY DEPT Emergency Medicine  As needed, If symptoms worsen [address] [facility]  [phone]           Patient's Medications   Start Taking    BACLOFEN (LIORESAL) 10 MG TABLET    Take 1 Tablet by mouth three (3) times daily for 3 days. Continue Taking    AIMOVIG AUTOINJECTOR 140 MG/ML INJECTION    INJECT 1 ML SUBCUTANEOUSLY EVERY 30 DAYS    BUSPIRONE (BUSPAR) 10 MG TABLET    Take 1 Tablet by mouth three (3) times daily. CETIRIZINE (ZYRTEC) 10 MG TABLET    Take  by mouth daily. CONJUGATED ESTROGENS (PREMARIN) 1.25 MG TABLET    Take 1.25 mg by mouth daily. GABAPENTIN (NEURONTIN) 600 MG TABLET    Take 1 Tab by mouth three (3) times daily. MELOXICAM (MOBIC) 15 MG TABLET    Take 15 mg by mouth daily. PROPRANOLOL LA (INDERAL LA) 60 MG SR CAPSULE    TAKE ONE CAPSULE BY MOUTH DAILY    TRAMADOL 300 MG TM24    Take  by mouth daily. VENLAFAXINE-SR (EFFEXOR-XR) 150 MG CAPSULE    TAKE ONE CAPSULE BY MOUTH EVERY MORNING WITH BREAKFAST   These Medications have changed    No medications on file   Stop Taking    No medications on file     Disclaimer: Sections of this note are dictated using utilizing voice recognition software. Minor typographical errors may be present. If questions arise, please do not hesitate to contact me or call our department.

## 2021-10-09 NOTE — ED NOTES
Mckeon catheter converted to leg bag, pt teaching provided r/t care; pt verbalized understanding of same. Discharge teaching provided for pt regarding treatment received, medications prescribed, and follow up care. Pt verbalized understanding of all discharge instructions. All questions answered. Pt left ambulatory with discharge paperwork in hand.

## 2021-10-11 LAB
BACTERIA SPEC CULT: NORMAL
SERVICE CMNT-IMP: NORMAL

## 2021-11-07 DIAGNOSIS — F41.9 ANXIETY: ICD-10-CM

## 2021-11-08 RX ORDER — VENLAFAXINE HYDROCHLORIDE 150 MG/1
CAPSULE, EXTENDED RELEASE ORAL
Qty: 90 CAPSULE | Refills: 2 | Status: SHIPPED | OUTPATIENT
Start: 2021-11-08 | End: 2022-08-08 | Stop reason: SDUPTHER

## 2021-12-02 RX ORDER — METHYLPHENIDATE HYDROCHLORIDE 5 MG/1
TABLET ORAL
COMMUNITY
Start: 2016-12-02 | End: 2022-02-16 | Stop reason: SDUPTHER

## 2021-12-07 DIAGNOSIS — F98.8 ATTENTION DEFICIT DISORDER (ADD) IN ADULT: Primary | ICD-10-CM

## 2021-12-07 DIAGNOSIS — G43.719 INTRACTABLE CHRONIC MIGRAINE WITHOUT AURA AND WITHOUT STATUS MIGRAINOSUS: ICD-10-CM

## 2021-12-07 RX ORDER — METHYLPHENIDATE HYDROCHLORIDE 5 MG/1
5 TABLET ORAL 2 TIMES DAILY
Qty: 60 TABLET | Refills: 0 | Status: SHIPPED | OUTPATIENT
Start: 2021-12-07 | End: 2022-01-06

## 2021-12-07 RX ORDER — ERENUMAB-AOOE 140 MG/ML
INJECTION, SOLUTION SUBCUTANEOUS
Qty: 1 ML | Refills: 11 | Status: SHIPPED | OUTPATIENT
Start: 2021-12-07

## 2021-12-07 RX ORDER — METHYLPHENIDATE HYDROCHLORIDE 5 MG/1
5 TABLET ORAL 2 TIMES DAILY
Qty: 60 TABLET | Refills: 0 | OUTPATIENT
Start: 2021-12-07 | End: 2022-02-05

## 2021-12-29 DIAGNOSIS — G43.719 INTRACTABLE CHRONIC MIGRAINE WITHOUT AURA AND WITHOUT STATUS MIGRAINOSUS: ICD-10-CM

## 2021-12-29 RX ORDER — NARATRIPTAN 2.5 MG/1
TABLET ORAL
Qty: 8 TABLET | Refills: 9 | Status: SHIPPED | OUTPATIENT
Start: 2021-12-29 | End: 2022-10-17 | Stop reason: SDUPTHER

## 2022-02-16 DIAGNOSIS — F98.8 ATTENTION DEFICIT DISORDER (ADD) IN ADULT: Primary | ICD-10-CM

## 2022-02-16 NOTE — TELEPHONE ENCOUNTER
Pt called for refill of:    Requested Prescriptions     Pending Prescriptions Disp Refills    methylphenidate HCl (Ritalin) 5 mg tablet       Please send to [facility] on [address]      Pt has appt w/ Simba Cheng on 8/25/22

## 2022-02-16 NOTE — TELEPHONE ENCOUNTER
Last note Dr. Shana Paz sent to patient via [id number]:    Tamara Cisse MD  to Negar Westl \"Gay West\"    [facility]      11:08 AM  I'll be leaving the practice in January. I dont think anyone will fill your medications after the last refill ( I sent in three). So please make an appointment with out nurse practitioner Natalia Rosenthal    Last read by Sue Ryan at 6:55 AM on 12/29/2021.

## 2022-02-24 RX ORDER — METHYLPHENIDATE HYDROCHLORIDE 5 MG/1
TABLET ORAL
Qty: 60 TABLET | Refills: 0 | Status: SHIPPED | OUTPATIENT
Start: 2022-02-24 | End: 2022-03-31 | Stop reason: SDUPTHER

## 2022-03-18 PROBLEM — M48.062 SPINAL STENOSIS OF LUMBAR REGION WITH NEUROGENIC CLAUDICATION: Status: ACTIVE | Noted: 2020-01-07

## 2022-03-19 PROBLEM — F41.9 ANXIETY: Status: ACTIVE | Noted: 2020-04-06

## 2022-03-19 PROBLEM — G43.701 CHRONIC MIGRAINE WITHOUT AURA WITH STATUS MIGRAINOSUS, NOT INTRACTABLE: Status: ACTIVE | Noted: 2018-05-01

## 2022-03-19 PROBLEM — F98.8 ATTENTION DEFICIT DISORDER (ADD): Status: ACTIVE | Noted: 2021-09-16

## 2022-03-20 PROBLEM — I10 ESSENTIAL HYPERTENSION: Status: ACTIVE | Noted: 2018-05-01

## 2022-03-31 DIAGNOSIS — F41.9 ANXIETY: ICD-10-CM

## 2022-03-31 DIAGNOSIS — F98.8 ATTENTION DEFICIT DISORDER (ADD) IN ADULT: ICD-10-CM

## 2022-03-31 RX ORDER — BUSPIRONE HYDROCHLORIDE 10 MG/1
10 TABLET ORAL 3 TIMES DAILY
Qty: 90 TABLET | Refills: 0 | Status: SHIPPED | OUTPATIENT
Start: 2022-03-31 | End: 2022-05-03 | Stop reason: SDUPTHER

## 2022-03-31 RX ORDER — METHYLPHENIDATE HYDROCHLORIDE 5 MG/1
5 TABLET ORAL 2 TIMES DAILY
Qty: 60 TABLET | Refills: 0 | Status: SHIPPED | OUTPATIENT
Start: 2022-03-31 | End: 2022-04-27 | Stop reason: SDUPTHER

## 2022-04-27 DIAGNOSIS — F98.8 ATTENTION DEFICIT DISORDER (ADD) IN ADULT: ICD-10-CM

## 2022-04-29 RX ORDER — METHYLPHENIDATE HYDROCHLORIDE 5 MG/1
5 TABLET ORAL 2 TIMES DAILY
Qty: 60 TABLET | Refills: 0 | Status: SHIPPED | OUTPATIENT
Start: 2022-04-29 | End: 2022-06-06 | Stop reason: SDUPTHER

## 2022-05-03 DIAGNOSIS — F41.9 ANXIETY: ICD-10-CM

## 2022-05-03 NOTE — TELEPHONE ENCOUNTER
Last Refill: 3-31-22  Last Visit: 9-16-21 Toyin   Next Visit: No scheduled follow up    Requested Prescriptions     Pending Prescriptions Disp Refills    busPIRone (BUSPAR) 10 mg tablet 90 Tablet 0     Sig: Take 1 Tablet by mouth three (3) times daily.

## 2022-05-04 RX ORDER — BUSPIRONE HYDROCHLORIDE 10 MG/1
10 TABLET ORAL 3 TIMES DAILY
Qty: 90 TABLET | Refills: 0 | Status: SHIPPED | OUTPATIENT
Start: 2022-05-04 | End: 2022-06-14 | Stop reason: SDUPTHER

## 2022-05-26 DIAGNOSIS — F98.8 ATTENTION DEFICIT DISORDER (ADD) IN ADULT: ICD-10-CM

## 2022-05-26 RX ORDER — METHYLPHENIDATE HYDROCHLORIDE 5 MG/1
5 TABLET ORAL 2 TIMES DAILY
Qty: 60 TABLET | Refills: 0 | Status: CANCELLED | OUTPATIENT
Start: 2022-05-26

## 2022-05-26 NOTE — TELEPHONE ENCOUNTER
Last office visit   Last med refill patient has not been seen  Virtual or in office by any other provider since 10/12/2020

## 2022-06-01 DIAGNOSIS — F41.9 ANXIETY: ICD-10-CM

## 2022-06-06 DIAGNOSIS — F98.8 ATTENTION DEFICIT DISORDER (ADD) IN ADULT: ICD-10-CM

## 2022-06-06 RX ORDER — METHYLPHENIDATE HYDROCHLORIDE 5 MG/1
5 TABLET ORAL 2 TIMES DAILY
Qty: 60 TABLET | Refills: 0 | Status: SHIPPED | OUTPATIENT
Start: 2022-06-06 | End: 2022-06-30 | Stop reason: SDUPTHER

## 2022-06-06 RX ORDER — METHYLPHENIDATE HYDROCHLORIDE 5 MG/1
5 TABLET ORAL 2 TIMES DAILY
Qty: 60 TABLET | Refills: 0 | Status: CANCELLED | OUTPATIENT
Start: 2022-06-06

## 2022-06-06 NOTE — TELEPHONE ENCOUNTER
I am writing because I need my Ritalin refilled. I have the soonest available appointment in August that I received in January the soonest was August 18 or 28th anyway I was told that as long as I had my appointment set up I would be okay. The backlog of 8months is crazy but totally out of my control. What am I supposed to do with out my medicine?   Please help, thanks!     Gay West   Last office visit 5/20/2020 with clay arellano with Kaushik Brewster 8/25/2022  Last med refill 4/29/2022

## 2022-06-14 DIAGNOSIS — F41.9 ANXIETY: ICD-10-CM

## 2022-06-15 RX ORDER — BUSPIRONE HYDROCHLORIDE 10 MG/1
10 TABLET ORAL 3 TIMES DAILY
Qty: 90 TABLET | Refills: 0 | Status: SHIPPED | OUTPATIENT
Start: 2022-06-15 | End: 2022-06-30 | Stop reason: SDUPTHER

## 2022-06-15 NOTE — TELEPHONE ENCOUNTER
PCP: Byron Morris MD    Last appt: 9/16/2021  Future Appointments   Date Time Provider Paula Brink   8/25/2022  8:30 AM DAYNA Polo BS AMB       Requested Prescriptions     Pending Prescriptions Disp Refills    busPIRone (BUSPAR) 10 mg tablet 90 Tablet 0     Sig: Take 1 Tablet by mouth three (3) times daily.          Other Comments:

## 2022-06-30 DIAGNOSIS — F98.8 ATTENTION DEFICIT DISORDER (ADD) IN ADULT: ICD-10-CM

## 2022-06-30 RX ORDER — METHYLPHENIDATE HYDROCHLORIDE 5 MG/1
5 TABLET ORAL 2 TIMES DAILY
Qty: 60 TABLET | Refills: 0 | Status: SHIPPED | OUTPATIENT
Start: 2022-06-30 | End: 2022-07-29 | Stop reason: SDUPTHER

## 2022-06-30 NOTE — TELEPHONE ENCOUNTER
Last office visit 10/12/2020 with clay PALENCIA with Ada Friday on 8/25/2022  Last med refill 6/6/2022

## 2022-07-29 DIAGNOSIS — F98.8 ATTENTION DEFICIT DISORDER (ADD) IN ADULT: ICD-10-CM

## 2022-07-29 RX ORDER — METHYLPHENIDATE HYDROCHLORIDE 5 MG/1
5 TABLET ORAL 2 TIMES DAILY
Qty: 60 TABLET | Refills: 0 | Status: SHIPPED | OUTPATIENT
Start: 2022-07-29 | End: 2022-09-02 | Stop reason: SDUPTHER

## 2022-07-29 NOTE — TELEPHONE ENCOUNTER
Requested Prescriptions     Pending Prescriptions Disp Refills    methylphenidate HCl (Ritalin) 5 mg tablet 60 Tablet 0     Sig: Take 1 Tablet by mouth two (2) times a day. Max Daily Amount: 10 mg. Last refill: 6/30/2022    Please review and fill if warranted.      LEROY Segovia    18:29 AM  Note   Last office visit 10/12/2020 with clay PALENCIA with Darlene Malagon on 8/25/2022

## 2022-08-08 ENCOUNTER — TELEPHONE (OUTPATIENT)
Dept: INTERNAL MEDICINE CLINIC | Age: 47
End: 2022-08-08

## 2022-08-08 DIAGNOSIS — F41.9 ANXIETY: ICD-10-CM

## 2022-08-08 RX ORDER — VENLAFAXINE HYDROCHLORIDE 150 MG/1
CAPSULE, EXTENDED RELEASE ORAL
Qty: 90 CAPSULE | Refills: 2 | Status: SHIPPED | OUTPATIENT
Start: 2022-08-08

## 2022-08-08 NOTE — TELEPHONE ENCOUNTER
Pt called stating that she is out of refills and only has two days of pills left. Pt would like to know if it can be refilled until she is able to be seen on 8/25. Please advise.  [phone]        Requested Prescriptions     Pending Prescriptions Disp Refills    venlafaxine-SR (EFFEXOR-XR) 150 mg capsule 90 Capsule 2

## 2022-08-09 ENCOUNTER — OFFICE VISIT (OUTPATIENT)
Dept: INTERNAL MEDICINE CLINIC | Age: 47
End: 2022-08-09
Payer: COMMERCIAL

## 2022-08-09 VITALS
RESPIRATION RATE: 16 BRPM | OXYGEN SATURATION: 98 % | SYSTOLIC BLOOD PRESSURE: 126 MMHG | HEART RATE: 94 BPM | TEMPERATURE: 98.7 F | WEIGHT: 181.7 LBS | DIASTOLIC BLOOD PRESSURE: 82 MMHG | HEIGHT: 65 IN | BODY MASS INDEX: 30.27 KG/M2

## 2022-08-09 DIAGNOSIS — J45.20 MILD INTERMITTENT ASTHMA WITHOUT COMPLICATION: ICD-10-CM

## 2022-08-09 DIAGNOSIS — J01.00 ACUTE NON-RECURRENT MAXILLARY SINUSITIS: Primary | ICD-10-CM

## 2022-08-09 PROCEDURE — 99213 OFFICE O/P EST LOW 20 MIN: CPT | Performed by: INTERNAL MEDICINE

## 2022-08-09 RX ORDER — LEVOFLOXACIN 500 MG/1
500 TABLET, FILM COATED ORAL DAILY
Qty: 10 TABLET | Refills: 0 | Status: SHIPPED | OUTPATIENT
Start: 2022-08-09 | End: 2022-08-25 | Stop reason: ALTCHOICE

## 2022-08-09 RX ORDER — ALBUTEROL SULFATE 90 UG/1
2 AEROSOL, METERED RESPIRATORY (INHALATION)
COMMUNITY

## 2022-08-09 RX ORDER — PREDNISONE 5 MG/1
TABLET ORAL
Qty: 48 TABLET | Refills: 0 | Status: SHIPPED | OUTPATIENT
Start: 2022-08-09

## 2022-08-09 NOTE — PROGRESS NOTES
Subjective:   Eric West is a 42 y.o. female      Chief Complaint   Patient presents with    Asthma     Worsened    Sinus Infection     X2 weeks; today last day of abx tx        History of present illness: She has a history of very mild asthma which she probably only uses 1 inhaler a year but now presents with a lot of sinus congestion over the past 2 weeks and [facility] and being placed on Augmentin she notes that her symptoms seem to be getting worse. Initially seemed to be getting better for couple of days after receiving treatment but now become worse. She denies any chest pain, shortness of breath or cardiorespiratory complaints but is having to use her inhaler more often. She does have a lot of cough but is not really getting any sputum. She notes no fevers or chills. She notes a lot of sinus pressure.     Patient Active Problem List   Diagnosis Code    S/P right colectomy Z90.49    Colitis K52.9    Lactic acidosis E87.2    History of intussusception Z87.19    Essential hypertension I10    Chronic migraine without aura with status migrainosus, not intractable G43.701    Spinal stenosis of lumbar region with neurogenic claudication M48.062    Anxiety F41.9    Attention deficit disorder (ADD) F98.8    Acute non-recurrent maxillary sinusitis J01.00    Mild intermittent asthma without complication K30.09      Past Medical History:   Diagnosis Date    Anxiety     Arthritis     back    Asthma     has not required tx in past 10 years    Chronic pain     lumbar    Dyspepsia and other specified disorders of function of stomach     Hypertension     Migraine     Nausea & vomiting       Allergies   Allergen Reactions    Paxil [Paroxetine Hcl] Anaphylaxis    Erythromycin Nausea and Vomiting    Percocet [Oxycodone-Acetaminophen] Other (comments)     Personality changes      Family History   Problem Relation Age of Onset    Hypertension Mother     Heart Disease Maternal Grandmother     Ovarian Cancer Maternal Grandmother         45s    Heart Disease Maternal Grandfather     Stroke Maternal Grandfather     Cancer Maternal Grandfather         colon, prostate    Cancer Paternal Grandmother         ovarian    Ovarian Cancer Paternal Grandmother         62s    Ovarian Cancer Maternal Aunt         35s    Breast Cancer Cousin       Social History     Socioeconomic History    Marital status:      Spouse name: Not on file    Number of children: Not on file    Years of education: Not on file    Highest education level: Not on file   Occupational History    Not on file   Tobacco Use    Smoking status: Former    Smokeless tobacco: Never   Vaping Use    Vaping Use: Never used   Substance and Sexual Activity    Alcohol use: No    Drug use: No    Sexual activity: Not on file   Other Topics Concern    Not on file   Social History Narrative    Not on file     Social Determinants of Health     Financial Resource Strain: Not on file   Food Insecurity: Not on file   Transportation Needs: Not on file   Physical Activity: Not on file   Stress: Not on file   Social Connections: Not on file   Intimate Partner Violence: Not on file   Housing Stability: Not on file     Prior to Admission medications    Medication Sig Start Date End Date Taking? Authorizing Provider   albuterol (PROVENTIL HFA, VENTOLIN HFA, PROAIR HFA) 90 mcg/actuation inhaler Take 2 Puffs by inhalation every four (4) hours as needed for Wheezing. Yes Provider, Historical   venlafaxine-SR [facility]) 150 mg capsule Take 1 tablet daily with breakfast 8/8/22  Yes Karen Esparza NP   methylphenidate HCl (Ritalin) 5 mg tablet Take 1 Tablet by mouth two (2) times a day. Max Daily Amount: 10 mg. 7/29/22  Yes Karen Esparza NP   busPIRone (BUSPAR) 10 mg tablet Take 1 Tablet by mouth three (3) times daily.  6/30/22  Yes Lester Rea MD   naratriptan (AMERGE) 2.5 mg tab TAKE ONE TABLET BY MOUTH DAILY AS NEEDED MIGRAINE FOR UP TO 30 DAYS 12/29/21  Yes Debbie Gibbons MD   Aimovig Autoinjector 140 mg/mL injection INJECT 1 ML UNDER THE SKIN EVERY 30 DAYS 12/7/21  Yes Debbie Gibbons MD   propranolol LA (INDERAL LA) 60 mg SR capsule TAKE ONE CAPSULE BY MOUTH DAILY 7/6/21  Yes Debbie Gibbons MD   traMADoL 300 mg TM24 Take  by mouth daily. Yes Provider, Historical   meloxicam (MOBIC) 15 mg tablet Take 15 mg by mouth daily. Yes Provider, Historical   cetirizine (ZYRTEC) 10 mg tablet Take  by mouth daily. Yes Provider, Historical   conjugated estrogens (PREMARIN) 1.25 mg tablet Take 1.25 mg by mouth daily. Yes Other, MD Bryce   gabapentin (NEURONTIN) 600 mg tablet Take 1 Tab by mouth three (3) times daily. Patient taking differently: Take 600 mg by mouth five (5) times daily. 11/5/12  Yes Yajaira West MD        Review of Systems              Constitutional:  She denies fever, weight loss, sweats or fatigue. EYES: No blurred or double vision,               ENT: Positive ahead and nasal congestion, no headache or dizziness. No difficulty with               swallowing, taste, speech or smell. Respiratory:  No cough with some wheezing without shortness of breath. No sputum production. Cardiac:  Denies chest pain, palpitations, unexplained indigestion, syncope, edema, PND or orthopnea. GI:  No changes in bowel movements, no abdominal pain, no bloating, anorexia, nausea, vomiting or heartburn. :  No frequency or dysuria. Denies incontinence or sexual dysfunction. Extremities:  No joint pain, stiffness or swelling  Back:.no pain or soreness  Skin:  No recent rashes or mole changes. Neurological:  No numbness, tingling, burning paresthesias or loss of motor strength. No syncope, dizziness, frequent headaches or memory loss.   Hematologic:  No easy bruising  Lymphatic: No lymph node enlargement    Objective:     Vitals:    08/09/22 0834   BP: 126/82   Pulse: 94   Resp: 16   Temp: 98.7 °F (37.1 °C)   TempSrc: Oral SpO2: 98%   Weight: 181 lb 11.2 oz (82.4 kg)   Height: 5' 5\" (1.651 m)   PainSc:   6   PainLoc: Face       Body mass index is 30.24 kg/m². Physical Examination:              General Appearance:  Well-developed, well-nourished, no acute distress. HEENT:      Ears:  The TMs and ear canals were clear. Eyes:  The pupillary responses were normal.  Extraocular muscle function intact. Lids and conjunctiva not injected. Funduscopic exam revealed sharp disc margins. Nares: Clear w/o edema or erythema  Pharynx:  Clear with teeth in good repair. No masses were noted. Neck:  Supple without thyromegaly or adenopathy. No JVD noted. No carotid                bruits. Lungs:  Clear to auscultation and percussion. Cardiac:  Regular rate and rhythm without murmur. PMI not displaced. No gallop, rub or click. Abdominal: Soft, non-tender, no hepata-spleenomegally or masses  Extremities:  No clubbing, cyanosis or edema. Skin:  No rash or unusual mole changes noted. Lymph Nodes:  None felt in the cervical, supraclavicular, axillary or inguinal region. Neurological: . DTRs 2+ and symmetric. Station and gait normal.   Hematologic:   No purpura or petechiae        Assessment/Plan:         1. Acute non-recurrent maxillary sinusitis    2. Mild intermittent asthma without complication        Impressions/Plan:  Impression  1 acute sinusitis sinus x-rays did not reveal an infiltrate so I will place on Levaquin for 10 days and stop the Augmentin  2. Asthma we will give her a Medrol Dosepak as well as get her to take over-the-counter Mucinex. Chest x-ray today was clear  Follow-up if not resolved otherwise per previous schedule. Orders Placed This Encounter    XR CHEST PA LAT    XR SINUSES PARANASAL MIN 3 V    albuterol (PROVENTIL HFA, VENTOLIN HFA, PROAIR HFA) 90 mcg/actuation inhaler           No results found for any visits on 08/09/22.       Taisha De Guzman MD    The patient was given after the visit summary the patient verbalized an understanding of the plans and problems as explained.

## 2022-08-09 NOTE — PROGRESS NOTES
Room: E1    Identified pt with two pt identifiers(name and ). Reviewed record in preparation for visit and have obtained necessary documentation. All patient medications has been reviewed. Chief Complaint   Patient presents with    Asthma     Worsened    Sinus Infection     X2 weeks; today last day of abx tx       Health Maintenance Due   Topic    DTaP/Tdap/Td series (1 - Tdap)    COVID-19 Vaccine (3 - Booster for Moderna series)       Vitals:    22 0834   BP: 126/82   Pulse: 94   Resp: 16   Temp: 98.7 °F (37.1 °C)   TempSrc: Oral   SpO2: 98%   Weight: 181 lb 11.2 oz (82.4 kg)   Height: 5' 5\" (1.651 m)   PainSc:   6   PainLoc: Face       4. Have you been to the ER, urgent care clinic since your last visit? Hospitalized since your last visit? Yes When: 2022 Where: [facility] Reason for visit: sinus infection    5. Have you seen or consulted any other health care providers outside of the [address] [place] [address] since your last visit? Include any pap smears or colon screening. No    Patient is accompanied by self I have received verbal consent from Gay West to discuss any/all medical information while they are present in the room.

## 2022-08-25 ENCOUNTER — OFFICE VISIT (OUTPATIENT)
Dept: NEUROLOGY | Age: 47
End: 2022-08-25
Payer: COMMERCIAL

## 2022-08-25 VITALS
TEMPERATURE: 97.7 F | WEIGHT: 182.6 LBS | HEIGHT: 65 IN | OXYGEN SATURATION: 97 % | SYSTOLIC BLOOD PRESSURE: 120 MMHG | HEART RATE: 79 BPM | BODY MASS INDEX: 30.42 KG/M2 | RESPIRATION RATE: 18 BRPM | DIASTOLIC BLOOD PRESSURE: 80 MMHG

## 2022-08-25 DIAGNOSIS — G43.711 INTRACTABLE CHRONIC MIGRAINE WITHOUT AURA AND WITH STATUS MIGRAINOSUS: Primary | ICD-10-CM

## 2022-08-25 DIAGNOSIS — F41.9 ANXIETY: ICD-10-CM

## 2022-08-25 DIAGNOSIS — M48.062 SPINAL STENOSIS OF LUMBAR REGION WITH NEUROGENIC CLAUDICATION: ICD-10-CM

## 2022-08-25 DIAGNOSIS — F98.8 ATTENTION DEFICIT DISORDER (ADD) WITHOUT HYPERACTIVITY: Chronic | ICD-10-CM

## 2022-08-25 PROCEDURE — 99215 OFFICE O/P EST HI 40 MIN: CPT | Performed by: PSYCHIATRY & NEUROLOGY

## 2022-08-25 NOTE — PROGRESS NOTES
[facility] 85  In Office FOLLOW-UP VISIT         Beatrice West is a 42 y.o. female who presents today for the following:  Chief Complaint   Patient presents with    Migraine     Follow up - does not think Shots are working - gets 1-2 times a week - lasts all day long     Attention Deficit Disorder     Having difficulty at end of the day - ? Going up on dose - takes second one at 15 N - still there after [address]  Patient is known to this practice. This is my first time seeing the patient. Chart and history reviewed in detail at today's office visit. 1. Intractable chronic migraine without aura and with status migrainosus  Assessment & Plan:  Since starting CGRP there is been a reduction in frequency intensity and duration as well as a reduction in rescue medication utilization and a reduction in this time at work/personal life    Generally has between 4 and 8 migraines per month  Prior to CGRP she was having 12 to 16/month    Continue Aimovig 140 mg daily  She is also on propranolol and Effexor as additional preventative    For rescue medication she continue with naratriptan but does not get as a robust response that she would like  Christi Aguilar has been prescribed for rescue medication  Prior authorization will need to be obtained co-pay card was given  Orders:  -     ubrogepant (UBRELVY) 100 mg tablet; Take 1 Tablet by mouth daily as needed for Migraine. Indications: a migraine headache, Normal, Disp-16 Tablet, R-12  2. Anxiety  Assessment & Plan:   Seems reasonably well-controlled she is on BuSpar this is managed outside of our practice  3. Attention deficit disorder (ADD) without hyperactivity  Assessment & Plan:    We will continue the patient on Ritalin 5 mg daily but I have asked her to go under the care of mental health for evaluation and treatment long-term of ADD  She has been given a list of providers in the area to contact to get established with a mental health provider  I will continue to prescribe the medication until she is under the care of mental health  4. Spinal stenosis of lumbar region with neurogenic claudication  Assessment & Plan:   Remains on gabapentin and tramadol and this is managed by orthopedics      Patient and/or family was given time to ask questions and voice concerns. I believe all questions concerns were adequately addressed at this  office visit. Patient and/or family also verbalized agreement and understanding of the above-stated plan    Patient remains a complex patient secondary to polypharmacy, significant comorbid conditions, and use of high-risk medications which complicate the decision making process related to patient's neurologic diagnosis          ICD-10-CM ICD-9-CM    1. Intractable chronic migraine without aura and with status migrainosus  G43.711 346.73 ubrogepant (UBRELVY) 100 mg tablet      2. Anxiety  F41.9 300.00       3. Attention deficit disorder (ADD) without hyperactivity  F98.8 314.00       4. Spinal stenosis of lumbar region with neurogenic claudication  M48.062 724.03             I attest that 40 minutes was spent on today's visit reviewing medical records and diagnostic testing deemed pertinent to this patient's care, along with direct time spent at patient's visit including the history, physical assessment and plan, discussing diagnosis and management along with documentation.       HPI  Historical Data  Patient is known to the practice and was previously seen by Dr Manas Benitez    Neurologic diagnosis  HAs migraines  Continue  Aimovig  First  dose of Aimovig given in clinic  Patient has failed Topamax  Patient has failed zonisamide  Patient has failed amitriptyline  Patient has failed Prozac  Patient is currently on propranolol, effexor  Each treatment option was tried more than 2 months  Patient suffers chronic intractable migraine headaches  Migraine frequency is approximately 5 a month or less since starting Aimovig    Rescue   Frova   Amerge   Imitrex   Maxalt   Relpax   Fioricet   tramadol               Other significant comorbid conditions/concerns  Anxiety  ADD  Hypertension      Interim Data:     Headaches  Aimovig 140mg   Getting migraines 2x/week   Prior : 3-4/week 12-16/month    She continues on rizatriptan and gets moderate response  She had responded well to Frova but insurance will not cover it  We will try her on Ubrelvy 100 mg as needed for rescue and see if she gets a more robust response from this then the rizatriptan    ADD   Patient previously been managed in our office with her other provider who is now left the practice   She presently is on 5 mg twice a day and think she needs an increase in dose               Pertinent diagnostic data      Results from Hospital Encounter encounter on 04/29/11    MRI SPINE LUMBAR WITHOUT CONTRAST              Allergies   Allergen Reactions    Paxil [Paroxetine Hcl] Anaphylaxis    Erythromycin Nausea and Vomiting    Percocet [Oxycodone-Acetaminophen] Other (comments)     Personality changes       Current Outpatient Medications   Medication Sig    ubrogepant (UBRELVY) 100 mg tablet Take 1 Tablet by mouth daily as needed for Migraine. Indications: a migraine headache    albuterol (PROVENTIL HFA, VENTOLIN HFA, PROAIR HFA) 90 mcg/actuation inhaler Take 2 Puffs by inhalation every four (4) hours as needed for Wheezing. predniSONE (STERAPRED) 5 mg dose pack See administration instruction per 5mg dose pack    venlafaxine-SR (EFFEXOR-XR) 150 mg capsule Take 1 tablet daily with breakfast    methylphenidate HCl (Ritalin) 5 mg tablet Take 1 Tablet by mouth two (2) times a day. Max Daily Amount: 10 mg.    busPIRone (BUSPAR) 10 mg tablet Take 1 Tablet by mouth three (3) times daily.     naratriptan (AMERGE) 2.5 mg tab TAKE ONE TABLET BY MOUTH DAILY AS NEEDED MIGRAINE FOR UP TO 30 DAYS    Aimovig Autoinjector 140 mg/mL injection INJECT 1 ML UNDER THE SKIN EVERY 30 DAYS propranolol LA (INDERAL LA) 60 mg SR capsule TAKE ONE CAPSULE BY MOUTH DAILY    traMADoL 300 mg TM24 Take  by mouth daily. meloxicam (MOBIC) 15 mg tablet Take 15 mg by mouth daily. cetirizine (ZYRTEC) 10 mg tablet Take  by mouth daily. conjugated estrogens (PREMARIN) 1.25 mg tablet Take 1.25 mg by mouth daily. gabapentin (NEURONTIN) 600 mg tablet Take 1 Tab by mouth three (3) times daily. (Patient taking differently: Take 600 mg by mouth five (5) times daily.)     No current facility-administered medications for this visit. Past medical history/surgical history, family history, and social history have been reviewed for today's visit      ROS    A ten system review of constitutional, cardiovascular, respiratory, musculoskeletal, endocrine, skin, SHEENT, genitourinary, psychiatric and neurologic systems was obtained and is unremarkable except as mentioned under HPI          EXAMINATION:     Visit Vitals  /80 (BP 1 Location: Left upper arm, BP Patient Position: Sitting, BP Cuff Size: Large adult)   Pulse 79   Temp 97.7 °F (36.5 °C) (Oral)   Resp 18   Ht 5' 5\" (1.651 m)   Wt 182 lb 9.6 oz (82.8 kg)   SpO2 97%   BMI 30.39 kg/m²         General appearance: Patient is well-developed and well-nourished in no apparent distress and well groomed.     Psych/mental health:  Affect: Appropriate    PHQ  3 most recent PHQ Screens 8/9/2022   Little interest or pleasure in doing things Not at all   Feeling down, depressed, irritable, or hopeless Not at all   Total Score PHQ 2 0       HEENT:   Normocephalic  With evidence of trauma: No  Full range of motion head neck: Yes  Tenderness to palpation of the head neck region: No      Cardiovascular:     Extremities warm to touch: Yes  Extremity swelling: No  Discoloration: No  Evidence of PVD: No    Respiratory:   Dyspnea on exertion: No   Abnormal effort on casual observation: No   Use of portable oxygen: No   Evidence of cyanosis: No     Musculoskeletal: Evidence of significant bone deformities: No   Spinal curvature: No     Integumentary:    Obvious bruising: No   Lacerations or discoloration on casual observation: No       Neurological Examination:   Mental Status:        MMSE  No flowsheet data found. Formal testing was not completed    there was nothing concerning on general observation and discussion. Alert oriented and appropriate to general conversation  Normal processing on general observation  Followed conversation and responded seemingly appropriate throughout the office visit  No word finding difficulties noted on casual observation  Able to follow directions without difficulty     Cranial Nerves:    EOMs intact gaze is conjugate  No nystagmus is appreciated  Facial motor intact bilaterally  Hearing intact to conversation  Voice with normal projection, no evidence of secretion pooling  Shoulder shrug intact bilaterally  No tongue deviation appreciated     Motor:   Normal bulk  No tremor appreciated on today's exam  No abnormal movements appreciated on today's exam  Moves extremities spontaneously and with purpose  5/5 x 4      Sensation: Intact to light touch    Coordination/Cerebellar:   Grossly intact    Gait: Ambulates independently    Reflexes:  symmetrical    Fall risk assessment  No flowsheet data found. Follow-up and Dispositions    Return in about 6 months (around 2/25/2023) for In office appointment.              Arlen Irene MS, ANP-BC, [facility]

## 2022-08-25 NOTE — Clinical Note
8/25/2022    Patient: Callie Harris   YOB: 1975   Date of Visit: 8/25/2022     Cortez Cheung MD  Via     Dear Cortez Cheung MD,      Thank you for referring Ms. Gay West to [address] [place] [address] for evaluation. My notes for this consultation are attached. If you have questions, please do not hesitate to call me. I look forward to following your patient along with you.       Sincerely,    Cheng Murphy, NP

## 2022-08-25 NOTE — PROGRESS NOTES
1. Have you been to the ER, urgent care clinic since your last visit? Hospitalized since your last visit? Yes - [place] - 2 for migraines and one for asthma    2. Have you seen or consulted any other health care providers outside of the [address] [place] [address] since your last visit? Include any pap smears or colon screening. Yes see above     Chief Complaint   Patient presents with    Migraine     Follow up - does not think Shots are working - gets 1-2 times a week - lasts all day long     Attention Deficit Disorder     Having difficulty at end of the day - ?  Going up on dose - takes second one at 15 N - still there after 5PM

## 2022-08-25 NOTE — ASSESSMENT & PLAN NOTE
We will continue the patient on Ritalin 5 mg daily but I have asked her to go under the care of mental health for evaluation and treatment long-term of ADD  She has been given a list of providers in the area to contact to get established with a mental health provider  I will continue to prescribe the medication until she is under the care of mental health

## 2022-08-25 NOTE — ASSESSMENT & PLAN NOTE
Since starting CGRP there is been a reduction in frequency intensity and duration as well as a reduction in rescue medication utilization and a reduction in this time at work/personal life    Generally has between 4 and 8 migraines per month  Prior to CGRP she was having 12 to 16/month    Continue Aimovig 140 mg daily  She is also on propranolol and Effexor as additional preventative    For rescue medication she continue with naratriptan but does not get as a robust response that she would like  Kervin Holes has been prescribed for rescue medication  Prior authorization will need to be obtained co-pay card was given
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
## 2022-09-02 DIAGNOSIS — F98.8 ATTENTION DEFICIT DISORDER (ADD) IN ADULT: ICD-10-CM

## 2022-09-02 RX ORDER — METHYLPHENIDATE HYDROCHLORIDE 5 MG/1
TABLET ORAL
Qty: 168 TABLET | Refills: 0 | Status: SHIPPED | OUTPATIENT
Start: 2022-09-02 | End: 2022-11-25

## 2022-09-11 ENCOUNTER — TELEPHONE (OUTPATIENT)
Dept: NEUROLOGY | Age: 47
End: 2022-09-11

## 2022-09-12 NOTE — TELEPHONE ENCOUNTER
Constance SWARTZ sent to [employer] for [employer] w/ 8/25/22 office note. [employer]) - [id number]    Status is pending.

## 2022-10-04 ENCOUNTER — TELEPHONE (OUTPATIENT)
Dept: NEUROLOGY | Age: 47
End: 2022-10-04

## 2022-10-17 DIAGNOSIS — G43.719 INTRACTABLE CHRONIC MIGRAINE WITHOUT AURA AND WITHOUT STATUS MIGRAINOSUS: ICD-10-CM

## 2022-10-17 RX ORDER — NARATRIPTAN 2.5 MG/1
TABLET ORAL
Qty: 8 TABLET | Refills: 9 | Status: SHIPPED | OUTPATIENT
Start: 2022-10-17

## 2022-10-17 RX ORDER — PROPRANOLOL HYDROCHLORIDE 60 MG/1
60 CAPSULE, EXTENDED RELEASE ORAL DAILY
Qty: 90 CAPSULE | Refills: 4 | Status: SHIPPED | OUTPATIENT
Start: 2022-10-17 | End: 2023-01-15

## 2022-10-17 NOTE — TELEPHONE ENCOUNTER
Good morning NP ! I am in need of refills on propranolol 60mg one a day and my naratriptan as well as venafaxaline 150mg one a day. Thank you! Gay West   Last office visit 8/25/2022  Last med refill  Propranolol 7/6/2021  Nartriptan 12/29/2021  Venlafaxine8/8/2022 with 2 refills.   None needed sent to [facility]

## 2022-10-20 DIAGNOSIS — F41.9 ANXIETY: ICD-10-CM

## 2022-10-20 RX ORDER — BUSPIRONE HYDROCHLORIDE 10 MG/1
TABLET ORAL
Qty: 90 TABLET | Refills: 3 | Status: SHIPPED | OUTPATIENT
Start: 2022-10-20

## 2022-10-25 ENCOUNTER — TRANSCRIBE ORDER (OUTPATIENT)
Dept: SCHEDULING | Age: 47
End: 2022-10-25

## 2022-10-25 DIAGNOSIS — Z12.31 ENCOUNTER FOR SCREENING MAMMOGRAM FOR MALIGNANT NEOPLASM OF BREAST: Primary | ICD-10-CM

## 2022-12-13 DIAGNOSIS — F98.8 ATTENTION DEFICIT DISORDER (ADD) IN ADULT: ICD-10-CM

## 2022-12-13 RX ORDER — METHYLPHENIDATE HYDROCHLORIDE 5 MG/1
TABLET ORAL
Qty: 168 TABLET | Refills: 0 | Status: SHIPPED | OUTPATIENT
Start: 2022-12-13 | End: 2023-03-07

## 2022-12-13 NOTE — TELEPHONE ENCOUNTER
Good afternoon Nurse Shalom Michaels! Its time for a refill of my Ritalin 5mg. Thank you!   Gay West     Last office visit 8/25/2022  Last med refill 9/2/2022

## 2023-01-11 DIAGNOSIS — G43.719 INTRACTABLE CHRONIC MIGRAINE WITHOUT AURA AND WITHOUT STATUS MIGRAINOSUS: ICD-10-CM

## 2023-01-11 NOTE — TELEPHONE ENCOUNTER
Received fax from [facility] requesting refill on [address] was 8/25/22, next is scheduled for 2/27/23.  Last refilled on 12/7/21 with 11 refills

## 2023-01-12 RX ORDER — ERENUMAB-AOOE 140 MG/ML
INJECTION, SOLUTION SUBCUTANEOUS
Qty: 1 ML | Refills: 11 | Status: SHIPPED | OUTPATIENT
Start: 2023-01-12

## 2023-01-25 DIAGNOSIS — F41.9 ANXIETY: ICD-10-CM

## 2023-01-25 RX ORDER — BUSPIRONE HYDROCHLORIDE 10 MG/1
10 TABLET ORAL 3 TIMES DAILY
Qty: 90 TABLET | Refills: 3 | Status: SHIPPED | OUTPATIENT
Start: 2023-01-25

## 2023-01-25 NOTE — TELEPHONE ENCOUNTER
Requested Prescriptions     Pending Prescriptions Disp Refills    busPIRone (BUSPAR) 10 mg tablet 90 Tablet 3     Sig: Take 1 Tablet by mouth three (3) times daily. RX refill request from the patient/pharmacy. Patient last seen 8/9/22 without labs, and next appt. scheduled for no future appts.

## 2023-01-27 ENCOUNTER — TELEPHONE (OUTPATIENT)
Dept: NEUROLOGY | Age: 48
End: 2023-01-27

## 2023-02-02 ENCOUNTER — HOSPITAL ENCOUNTER (OUTPATIENT)
Dept: MAMMOGRAPHY | Age: 48
Discharge: HOME | End: 2023-02-02
Attending: SPECIALIST
Payer: COMMERCIAL

## 2023-02-02 DIAGNOSIS — Z12.31 ENCOUNTER FOR SCREENING MAMMOGRAM FOR MALIGNANT NEOPLASM OF BREAST: ICD-10-CM

## 2023-02-02 PROCEDURE — 77067 SCR MAMMO BI INCL CAD: CPT

## 2023-02-07 ENCOUNTER — TELEPHONE (OUTPATIENT)
Dept: NEUROLOGY | Age: 48
End: 2023-02-07

## 2023-02-07 NOTE — TELEPHONE ENCOUNTER
[employer]: Gisela Smith) - APPROVED     Auth #: [id number]  01/31/2023 to 07/30/2023  Sent to [employer] via [employer]    Imported to [facility] [employer]

## 2023-02-27 ENCOUNTER — OFFICE VISIT (OUTPATIENT)
Dept: NEUROLOGY | Age: 48
End: 2023-02-27
Payer: COMMERCIAL

## 2023-02-27 VITALS
RESPIRATION RATE: 15 BRPM | OXYGEN SATURATION: 100 % | SYSTOLIC BLOOD PRESSURE: 104 MMHG | HEIGHT: 65 IN | BODY MASS INDEX: 31.32 KG/M2 | DIASTOLIC BLOOD PRESSURE: 74 MMHG | HEART RATE: 80 BPM | WEIGHT: 188 LBS

## 2023-02-27 DIAGNOSIS — G43.719 INTRACTABLE CHRONIC MIGRAINE WITHOUT AURA AND WITHOUT STATUS MIGRAINOSUS: Primary | ICD-10-CM

## 2023-02-27 DIAGNOSIS — F98.8 ATTENTION DEFICIT DISORDER (ADD) WITHOUT HYPERACTIVITY: ICD-10-CM

## 2023-02-27 DIAGNOSIS — M48.062 SPINAL STENOSIS OF LUMBAR REGION WITH NEUROGENIC CLAUDICATION: ICD-10-CM

## 2023-02-27 PROCEDURE — 3078F DIAST BP <80 MM HG: CPT | Performed by: PSYCHIATRY & NEUROLOGY

## 2023-02-27 PROCEDURE — 99214 OFFICE O/P EST MOD 30 MIN: CPT | Performed by: PSYCHIATRY & NEUROLOGY

## 2023-02-27 PROCEDURE — 3074F SYST BP LT 130 MM HG: CPT | Performed by: PSYCHIATRY & NEUROLOGY

## 2023-02-27 NOTE — ASSESSMENT & PLAN NOTE
Since starting CGRP there is been a reduction in frequency intensity and duration as well as a reduction in rescue medication utilization and a reduction in this time at work/personal life  Patient is to continue on Aimovig 140 mg monthly    For rescue medication she is to continue on Ubrelvy 100 mg as needed and Amerge as needed using stratified protocol

## 2023-02-27 NOTE — ASSESSMENT & PLAN NOTE
Patient doing well she is followed with the pain management team at [facility] and she is to continue to follow with them as appropriate

## 2023-02-27 NOTE — PATIENT INSTRUCTIONS
As per our discussion    Overall you continue to do well I would not recommend any changes in treatments or interventions at this time    Continue to follow-up with your PCP and any specialist as appropriate    If you need a new prescription before we see you back please ask your pharmacy to send us an electronic renewal that is the most efficient method of medication renewal       Office Policies      Appointments  Please make sure that you arrive for your next appointment at least 15 minutes prior to your appointment time. If for some reason you are going to be late please notify the office to determine if you need to be rescheduled or we can adjust your appointment time      Phone calls/patient messages:  Please allow up to 24 hours for someone in the office to contact you about your call or message. Be mindful your provider may be out of the office or your message may require further review. We encourage you to use [employer] for your messages as this is a faster, more efficient way to communicate with our office    Medication Refills:  Prescription medications require up to 48 business hours to process. We encourage you to use [employer] for your refills. For controlled medications: Please allow up to 72 business hours to process. Certain medications may require you to  a written prescription at our office. NO narcotic/controlled medications will be prescribed after 4pm Monday through Friday or on weekends    Form/Paperwork Completion:  We ask that you allow 7-14 business days. You may also download your forms to [employer] to have your doctor print off.

## 2023-02-27 NOTE — PROGRESS NOTES
1. Have you been to the ER, urgent care clinic since your last visit? Hospitalized since your last visit? No.    2. Have you seen or consulted any other health care providers outside of the [address] [place] [address] since your last visit? Include any pap smears or colon screening.    No.        Chief Complaint   Patient presents with    Migraine     6 month- pt denies any symptoms

## 2023-02-27 NOTE — ASSESSMENT & PLAN NOTE
Patient is still trying to find a mental health provider to assume the care of her ADD at this point we will continue her Ritalin for her until she is able to find a mental health provider that we will manage this medication

## 2023-02-27 NOTE — Clinical Note
2/27/2023    Patient: Laith Mojica   YOB: 1975   Date of Visit: 2/27/2023     Dariana Moore MD  Via     Dear Dariana Moore MD,      Thank you for referring Ms. Gay West to [address] [place] [address] for evaluation. My notes for this consultation are attached. If you have questions, please do not hesitate to call me. I look forward to following your patient along with you.       Sincerely,    CHOCO Vila-C

## 2023-02-27 NOTE — PROGRESS NOTES
[facility] [address]  In Office FOLLOW-UP VISIT         James West is a 41 y.o. female who presents today for the following:  Chief Complaint   Patient presents with    Migraine     6 month- pt denies any symptoms         ASSESSMENT AND PLAN      1. Intractable chronic migraine without aura and without status migrainosus  Assessment & Plan:  Since starting CGRP there is been a reduction in frequency intensity and duration as well as a reduction in rescue medication utilization and a reduction in this time at work/personal life  Patient is to continue on Aimovig 140 mg monthly    For rescue medication she is to continue on Ubrelvy 100 mg as needed and Amerge as needed using stratified protocol  2. Attention deficit disorder (ADD) without hyperactivity  Assessment & Plan:   Patient is still trying to find a mental health provider to assume the care of her ADD at this point we will continue her Ritalin for her until she is able to find a mental health provider that we will manage this medication  3. Spinal stenosis of lumbar region with neurogenic claudication  Assessment & Plan:   Patient doing well she is followed with the pain management team at [address] and she is to continue to follow with them as appropriate      Patient and/or family was given time to ask questions and voice concerns. I believe all questions concerns were adequately addressed at this  office visit. Patient and/or family also verbalized agreement and understanding of the above-stated plan    Complex neurologic decision making secondary any or all of the following to include unclear etiology, and /or polypharmacy, and/or significant comorbid conditions, and/or use of high-risk medications which complicate the decision making process related to patient's neurologic diagnosis          ICD-10-CM ICD-9-CM    1. Intractable chronic migraine without aura and without status migrainosus  G43.719 346.71       2.  Attention deficit disorder (ADD) without hyperactivity  F98.8 314.00       3. Spinal stenosis of lumbar region with neurogenic claudication  M48.062 724.03               HPI  Historical Data  Patient is known to the practice and was previously seen by Dr Shreyas Jade    Neurologic diagnosis  HAs migraines  Continue  Aimovig  First  dose of Aimovig given in clinic  Patient has failed Topamax  Patient has failed zonisamide  Patient has failed amitriptyline  Patient has failed Prozac  Patient is currently on propranolol, effexor  Each treatment option was tried more than 2 months  Patient suffers chronic intractable migraine headaches  Migraine frequency is approximately 5 a month or less since starting Aimovig    Rescue   Frova   Amerge   Imitrex   Maxalt   Relpax   Fioricet   tramadol               Other significant comorbid conditions/concerns  Anxiety  ADD  Hypertension  Chronic Pain: managed at [facility] pain [facility]      Interim Data:     Headaches and migraines  Aimovig 140mg   Getting migraines 2x/week   Prior : 3-4/week 12-16/month    Rescue: Loli Chandler and Amleydi     ADD   Patient previously been managed in our office with her other provider who is now left the practice   She presently is on 5 mg twice a day and think she needs an increase in dose               Pertinent diagnostic data      Results from Hospital Encounter encounter on 04/29/11    MRI SPINE LUMBAR WITHOUT CONTRAST              Allergies   Allergen Reactions    Paxil [Paroxetine Hcl] Anaphylaxis    Erythromycin Nausea and Vomiting    Percocet [Oxycodone-Acetaminophen] Other (comments)     Personality changes       Current Outpatient Medications   Medication Sig    busPIRone (BUSPAR) 10 mg tablet Take 1 Tablet by mouth three (3) times daily.     erenumab-aooe (Aimovig Autoinjector) 140 mg/mL injection INJECT 1 ML UNDER THE SKIN EVERY 30 DAYS    methylphenidate HCl (Ritalin) 5 mg tablet Take 1 Tablet by mouth two (2) times a day for 28 days, THEN 1 Tablet two (2) times a day for 28 days, THEN 1 Tablet two (2) times a day for 28 days. Max Daily Amount: 10 mg.    naratriptan (AMERGE) 2.5 mg tab TAKE ONE TABLET BY MOUTH DAILY AS NEEDED MIGRAINE FOR UP TO 30 DAYS    ubrogepant (UBRELVY) 100 mg tablet Take 1 Tablet by mouth daily as needed for Migraine. Indications: a migraine headache    albuterol (PROVENTIL HFA, VENTOLIN HFA, PROAIR HFA) 90 mcg/actuation inhaler Take 2 Puffs by inhalation every four (4) hours as needed for Wheezing. venlafaxine-SR (EFFEXOR-XR) 150 mg capsule Take 1 tablet daily with breakfast    traMADoL 300 mg TM24 Take  by mouth daily. meloxicam (MOBIC) 15 mg tablet Take 15 mg by mouth daily. cetirizine (ZYRTEC) 10 mg tablet Take  by mouth daily. conjugated estrogens (PREMARIN) 1.25 mg tablet Take 1.25 mg by mouth daily. gabapentin (NEURONTIN) 600 mg tablet Take 1 Tab by mouth three (3) times daily. (Patient taking differently: Take 600 mg by mouth five (5) times daily.)     No current facility-administered medications for this visit. Past medical history/surgical history, family history, and social history have been reviewed for today's visit      ROS    A ten system review of constitutional, cardiovascular, respiratory, musculoskeletal, endocrine, skin, SHEENT, genitourinary, psychiatric and neurologic systems was obtained and is unremarkable except as mentioned under HPI          EXAMINATION:     Visit Vitals  /74 (BP 1 Location: Left upper arm, BP Patient Position: Sitting, BP Cuff Size: Large adult)   Pulse 80   Resp 15   Ht 5' 5\" (1.651 m)   Wt 188 lb (85.3 kg)   SpO2 100%   BMI 31.28 kg/m²         General appearance: Patient is well-developed and well-nourished in no apparent distress and well groomed.     Psych/mental health:  Affect: Appropriate    PHQ  3 most recent PHQ Screens 2/27/2023   Little interest or pleasure in doing things Not at all   Feeling down, depressed, irritable, or hopeless Not at all   Total Score PHQ 2 0 HEENT:   Normocephalic  With evidence of trauma: No  Full range of motion head neck: Yes  Tenderness to palpation of the head neck region: No      Cardiovascular:     Extremities warm to touch: Yes  Extremity swelling: No  Discoloration: No  Evidence of PVD: No    Respiratory:   Dyspnea on exertion: No   Abnormal effort on casual observation: No   Use of portable oxygen: No   Evidence of cyanosis: No     Musculoskeletal:   Evidence of significant bone deformities: No   Spinal curvature: No     Integumentary:    Obvious bruising: No   Lacerations or discoloration on casual observation: No       Neurological Examination:   Mental Status:        MMSE  No flowsheet data found. Formal testing was not completed    there was nothing concerning on general observation and discussion. Alert oriented and appropriate to general conversation  Normal processing on general observation  Followed conversation and responded seemingly appropriate throughout the office visit  No word finding difficulties noted on casual observation  Able to follow directions without difficulty     Cranial Nerves:    Grossly intact    Motor:   Normal bulk  No tremor appreciated on today's exam  No abnormal movements appreciated on today's exam  Moves extremities spontaneously and with purpose  5/5 x 4 so this is a patient WhatsApp but I am not anywhere sweats      Sensation: Intact to light touch    Coordination/Cerebellar:   Grossly intact    Gait: Ambulates independently    Reflexes:  symmetrical    Fall risk assessment  No flowsheet data found. Follow-up and Dispositions    Return in about 6 months (around 8/27/2023) for In office appointment.                Arlet Gayle MS, ANP-BC, [facility]

## 2023-03-06 DIAGNOSIS — F98.8 ATTENTION DEFICIT DISORDER (ADD) IN ADULT: ICD-10-CM

## 2023-03-06 RX ORDER — METHYLPHENIDATE HYDROCHLORIDE 5 MG/1
TABLET ORAL
Qty: 168 TABLET | Refills: 0 | Status: SHIPPED | OUTPATIENT
Start: 2023-03-06 | End: 2023-05-29

## 2023-03-21 DIAGNOSIS — F41.9 ANXIETY: ICD-10-CM

## 2023-03-21 NOTE — TELEPHONE ENCOUNTER
Pt has thrown up 2 of her cymbalta pills. She has one left for tomorrow (03/22/23). Would like script filled ASAP.     Please call with any questions [phone]

## 2023-03-22 NOTE — TELEPHONE ENCOUNTER
Pt called back about message [facility] sent in [place]. I misspoke. She does need venalafaxiline. She is out of medication and would like filled today.

## 2023-03-22 NOTE — TELEPHONE ENCOUNTER
Last office visit 2/27/2023  Last med refill   Venlafaxine 8/8/2022  Cymbalta in not in the patient's history and unable to find where it was ever ordered from this office. Sent message to patient through my chart asking dosage and how often?

## 2023-03-23 ENCOUNTER — OFFICE VISIT (OUTPATIENT)
Dept: INTERNAL MEDICINE CLINIC | Age: 48
End: 2023-03-23
Payer: COMMERCIAL

## 2023-03-23 VITALS
BODY MASS INDEX: 31.82 KG/M2 | TEMPERATURE: 98.3 F | SYSTOLIC BLOOD PRESSURE: 130 MMHG | RESPIRATION RATE: 18 BRPM | DIASTOLIC BLOOD PRESSURE: 72 MMHG | HEIGHT: 65 IN | HEART RATE: 80 BPM | WEIGHT: 191 LBS | OXYGEN SATURATION: 98 %

## 2023-03-23 DIAGNOSIS — G43.701 CHRONIC MIGRAINE WITHOUT AURA WITH STATUS MIGRAINOSUS, NOT INTRACTABLE: Primary | ICD-10-CM

## 2023-03-23 DIAGNOSIS — R11.0 NAUSEA: ICD-10-CM

## 2023-03-23 PROCEDURE — 99213 OFFICE O/P EST LOW 20 MIN: CPT | Performed by: PHYSICIAN ASSISTANT

## 2023-03-23 PROCEDURE — 3075F SYST BP GE 130 - 139MM HG: CPT | Performed by: PHYSICIAN ASSISTANT

## 2023-03-23 PROCEDURE — 3078F DIAST BP <80 MM HG: CPT | Performed by: PHYSICIAN ASSISTANT

## 2023-03-23 RX ORDER — VENLAFAXINE HYDROCHLORIDE 150 MG/1
CAPSULE, EXTENDED RELEASE ORAL
Qty: 90 CAPSULE | Refills: 2 | Status: SHIPPED | OUTPATIENT
Start: 2023-03-23

## 2023-03-23 RX ORDER — ONDANSETRON 4 MG/1
4 TABLET, ORALLY DISINTEGRATING ORAL
Qty: 20 TABLET | Refills: 0 | Status: SHIPPED | OUTPATIENT
Start: 2023-03-23

## 2023-03-23 NOTE — PROGRESS NOTES
Room:   Identified pt with two pt identifiers(name and ). Reviewed record in preparation for visit and have obtained necessary documentation. Chief Complaint   Patient presents with    GI Problem     Nausea and vomiting x5 days    Headache        Vitals:    23 0828   BP: 130/72   Pulse: 80   Resp: 18   Temp: 98.3 °F (36.8 °C)   TempSrc: Oral   SpO2: 98%   Weight: 191 lb (86.6 kg)   Height: 5' 5\" (1.651 m)   PainSc:   2   PainLoc: Abdomen       Health Maintenance Due   Topic    DTaP/Tdap/Td series (1 - Tdap)    Cervical cancer screen     COVID-19 Vaccine (3 - Booster for Moderna series)    Flu Vaccine (1)    Colorectal Cancer Screening Combo        1. \"Have you been to the ER, urgent care clinic since your last visit? Hospitalized since your last visit? \" No    2. \"Have you seen or consulted any other health care providers outside of the [address] [place] [address] since your last visit? \" No     3. For patients over 45: Has the patient had a colonoscopy? NA - based on age     If the patient is female:    4. For patients over 36: Has the patient had a mammogram? NA - based on age    11. For patients over 21: Has the patient had a pap smear? No    Current Outpatient Medications   Medication Instructions    albuterol (PROVENTIL HFA, VENTOLIN HFA, PROAIR HFA) 90 mcg/actuation inhaler 2 Puffs, Inhalation, EVERY 4 HOURS AS NEEDED    busPIRone (BUSPAR) 10 mg, Oral, 3 TIMES DAILY    cetirizine (ZYRTEC) 10 mg tablet Oral, DAILY    conjugated estrogens (PREMARIN) 1.25 mg, DAILY    erenumab-aooe (Aimovig Autoinjector) 140 mg/mL injection INJECT 1 ML UNDER THE SKIN EVERY 30 DAYS    gabapentin (NEURONTIN) 600 mg, Oral, 3 TIMES DAILY    meloxicam (MOBIC) 15 mg, Oral, DAILY    methylphenidate HCl (Ritalin) 5 mg tablet Take 1 Tablet by mouth two (2) times a day for 28 days, THEN 1 Tablet two (2) times a day for 28 days, THEN 1 Tablet two (2) times a day for 28 days.  Max Daily Amount: 10 mg.    naratriptan (AMERGE) 2.5 mg tab TAKE ONE TABLET BY MOUTH DAILY AS NEEDED MIGRAINE FOR UP TO 30 DAYS    traMADoL 300 mg TM24 Oral, DAILY    ubrogepant (UBRELVY) 100 mg, Oral, DAILY AS NEEDED    venlafaxine-SR (EFFEXOR-XR) 150 mg capsule Take 1 tablet daily with breakfast       Allergies   Allergen Reactions    Paxil [Paroxetine Hcl] Anaphylaxis    Erythromycin Nausea and Vomiting    Percocet [Oxycodone-Acetaminophen] Other (comments)     Personality changes       Immunization History   Administered Date(s) Administered    COVID-19, MODERNA BLUE border, Primary or Immunocompromised, (age 18y+), IM, 100 mcg/0.5mL 01/20/2021, 02/16/2021    COVID-19, MODERNA PURPLE border, (age 18y+ booster, 6y-11y series), IM, 50 mcg/0.5 mL 09/07/2021    Influenza Vaccine 09/28/2017, 10/01/2019, 09/27/2020    Influenza, FLUARIX, FLULAVAL, FLUZONE (age 10 mo+) AND AFLURIA, (age 1 y+), PF, 0.5mL 09/28/2017, 09/24/2020, 09/08/2021    Pneumococcal Polysaccharide (PPSV-23) 04/27/2019    Zoster Vaccine, Live 09/28/2017       Past Medical History:   Diagnosis Date    Anemia 2016    Anxiety     Arthritis     back    Asthma     has not required tx in past 10 years    Chronic pain     lumbar    Dyspepsia and other specified disorders of function of stomach     Hypertension     Migraine     Nausea & vomiting

## 2023-03-23 NOTE — PROGRESS NOTES
HPI:  41 y.o.  presents for acute visit c/o nausea x 5 d, started when she had a migraine.     She has h/o migraine, followed by neurology Jarvis Mejia NP   On Aimovig qmo, and has Amerge and Mardella Scripture for rescue  Current nausea started with a migraine 5 d ago, with vomiting x 1    This HA felt worse for her and the N/V is unusual  She treated her migraine with Amerge, which improved the HA but she still has pain in her neck (which has occurred before)  She delayed taking her Amerge since she was afraid she might vomit it back up  The nausea has persisted, only had vomiting the first day    She does not have diarrhea  No abdominal pain  No indigestion or heartburn  No fevers  No URI, myalgias  No household GI illnesses  She has been eating bland, small meals  No urinary sxs, normal bowel and bladder    Last PCP visit was with Dr Randell Vasquez 9/16/21, who has since retired  Acute visit 8/9/22 for sinusitis    PMH also includes ADD on Ritalin, anxiety on effexor and buspar, chronic pain syndrome on gabapentin, tramadol, meloxicam    Patient Active Problem List    Diagnosis    Attention deficit disorder (ADD)    Anxiety    Spinal stenosis of lumbar region with neurogenic claudication    Lactic acidosis    Intractable chronic migraine without aura and without status migrainosus    Acute non-recurrent maxillary sinusitis    Mild intermittent asthma without complication    Essential hypertension    Chronic migraine without aura with status migrainosus, not intractable    History of intussusception     Recurrent ileocolic intussusception, with prior ileocecal resection for ileocecal intussusception      Colitis    S/P right colectomy         Past Medical History:   Diagnosis Date    Anemia 2016    Anxiety     Arthritis     back    Asthma     has not required tx in past 10 years    Chronic pain     lumbar    Dyspepsia and other specified disorders of function of stomach     Hypertension     Migraine     Nausea & vomiting Social History     Tobacco Use    Smoking status: Former     Types: Cigarettes     Start date: 1999     Quit date: 10/8/2010     Years since quittin.4    Smokeless tobacco: Never   Vaping Use    Vaping Use: Never used   Substance Use Topics    Alcohol use: Never    Drug use: Never       Outpatient Medications Marked as Taking for the 3/23/23 encounter (Office Visit) with Maria Del Rosario Collado PA-C   Medication Sig Dispense Refill    methylphenidate HCl (Ritalin) 5 mg tablet Take 1 Tablet by mouth two (2) times a day for 28 days, THEN 1 Tablet two (2) times a day for 28 days, THEN 1 Tablet two (2) times a day for 28 days. Max Daily Amount: 10 mg. 168 Tablet 0    busPIRone (BUSPAR) 10 mg tablet Take 1 Tablet by mouth three (3) times daily. 90 Tablet 3    erenumab-aooe (Aimovig Autoinjector) 140 mg/mL injection INJECT 1 ML UNDER THE SKIN EVERY 30 DAYS 1 mL 11    naratriptan (AMERGE) 2.5 mg tab TAKE ONE TABLET BY MOUTH DAILY AS NEEDED MIGRAINE FOR UP TO 30 DAYS 8 Tablet 9    ubrogepant (UBRELVY) 100 mg tablet Take 1 Tablet by mouth daily as needed for Migraine. Indications: a migraine headache 16 Tablet 12    albuterol (PROVENTIL HFA, VENTOLIN HFA, PROAIR HFA) 90 mcg/actuation inhaler Take 2 Puffs by inhalation every four (4) hours as needed for Wheezing. venlafaxine-SR (EFFEXOR-XR) 150 mg capsule Take 1 tablet daily with breakfast 90 Capsule 2    traMADoL 300 mg TM24 Take  by mouth daily. meloxicam (MOBIC) 15 mg tablet Take 15 mg by mouth daily. cetirizine (ZYRTEC) 10 mg tablet Take  by mouth daily. conjugated estrogens (PREMARIN) 1.25 mg tablet Take 1.25 mg by mouth daily. gabapentin (NEURONTIN) 600 mg tablet Take 1 Tab by mouth three (3) times daily.  (Patient taking differently: Take 600 mg by mouth five (5) times daily.) 90 Tab 5       Allergies   Allergen Reactions    Paxil [Paroxetine Hcl] Anaphylaxis    Erythromycin Nausea and Vomiting    Percocet [Oxycodone-Acetaminophen] Other (comments)     Personality changes       ROS:  ROS negative except as per HPI. PE:  Visit Vitals  /72 (BP 1 Location: Left upper arm, BP Patient Position: Sitting, BP Cuff Size: Adult)   Pulse 80   Temp 98.3 °F (36.8 °C) (Oral)   Resp 18   Ht 5' 5\" (1.651 m)   Wt 191 lb (86.6 kg)   SpO2 98%   BMI 31.78 kg/m²     Gen: alert, oriented, no acute distress  Head: normocephalic, atraumatic  Eyes: pupils equal round reactive to light, sclera clear, conjunctiva clear, no nystagmus   Oral: moist mucus membranes, no oral lesions, no pharyngeal inflammation or exudate  Neck: supple, no lymphadenopathy  Resp: no increase work of breathing, lungs clear to ausculation bilaterally, no wheezing, rales or rhonchi  CV: S1, S2 normal.  No murmurs, rubs, or gallops. Abd: soft, not tender, not distended. No hepatosplenomegaly. Normal bowel sounds. Neuro: cranial nerves intact, normal movement in all extremities  Skin: no lesion or rash  Extremities: no cyanosis or edema    No results found for this visit on 03/23/23. Assessment/Plan:      ICD-10-CM ICD-9-CM    1. Chronic migraine without aura with status migrainosus, not intractable  G43.701 346.72       2. Nausea  R11.0 787.02 ondansetron (ZOFRAN ODT) 4 mg disintegrating tablet          Persistent nausea since having a migraine, neurologically intact  Will treat with zofran prn, which she has tolerated before, cautioned regarding side effect of HA  Since this pattern is unusual for her, I have asked her to follow up with her neurology team  Also review with neurology other formulations of triptans that are not PO, such as imitrex nasal or maxalt dissolvable tablet  Schedule annual exam with new PCP    Health Maintenance reviewed - deferred. Orders Placed This Encounter    ondansetron (ZOFRAN ODT) 4 mg disintegrating tablet     Sig: Take 1 Tablet by mouth every eight (8) hours as needed for Nausea or Vomiting.      Dispense:  20 Tablet     Refill:  0       There are no discontinued medications. Current Outpatient Medications   Medication Sig Dispense Refill    methylphenidate HCl (Ritalin) 5 mg tablet Take 1 Tablet by mouth two (2) times a day for 28 days, THEN 1 Tablet two (2) times a day for 28 days, THEN 1 Tablet two (2) times a day for 28 days. Max Daily Amount: 10 mg. 168 Tablet 0    busPIRone (BUSPAR) 10 mg tablet Take 1 Tablet by mouth three (3) times daily. 90 Tablet 3    erenumab-aooe (Aimovig Autoinjector) 140 mg/mL injection INJECT 1 ML UNDER THE SKIN EVERY 30 DAYS 1 mL 11    naratriptan (AMERGE) 2.5 mg tab TAKE ONE TABLET BY MOUTH DAILY AS NEEDED MIGRAINE FOR UP TO 30 DAYS 8 Tablet 9    ubrogepant (UBRELVY) 100 mg tablet Take 1 Tablet by mouth daily as needed for Migraine. Indications: a migraine headache 16 Tablet 12    albuterol (PROVENTIL HFA, VENTOLIN HFA, PROAIR HFA) 90 mcg/actuation inhaler Take 2 Puffs by inhalation every four (4) hours as needed for Wheezing. venlafaxine-SR (EFFEXOR-XR) 150 mg capsule Take 1 tablet daily with breakfast 90 Capsule 2    traMADoL 300 mg TM24 Take  by mouth daily. meloxicam (MOBIC) 15 mg tablet Take 15 mg by mouth daily. cetirizine (ZYRTEC) 10 mg tablet Take  by mouth daily. conjugated estrogens (PREMARIN) 1.25 mg tablet Take 1.25 mg by mouth daily. gabapentin (NEURONTIN) 600 mg tablet Take 1 Tab by mouth three (3) times daily. (Patient taking differently: Take 600 mg by mouth five (5) times daily.) 90 Tab 5       Recommended healthy diet low in carbohydrates, fats, sodium and cholesterol. Recommended regular cardiovascular exercise 3-6 times per week for 30-60 minutes daily. Verbal and written instructions (see AVS) provided. Patient expresses understanding of diagnosis and treatment plan. Follow-up and Dispositions    Return in about 3 months (around 6/23/2023) for establish PCP, annual wellness exam with Jacqueline Cohen NP.        Future Appointments   Date Time Provider Department [place]   6/14/2023  1:00 PM Kody Patch, [facility]   9/12/2023  8:30 AM Carlee Caldwell, [facility]

## 2023-04-18 ENCOUNTER — PATIENT MESSAGE (OUTPATIENT)
Dept: NEUROLOGY | Age: 48
End: 2023-04-18

## 2023-04-20 ENCOUNTER — TELEPHONE (OUTPATIENT)
Dept: NEUROLOGY | Age: 48
End: 2023-04-20

## 2023-05-01 DIAGNOSIS — F41.9 ANXIETY: ICD-10-CM

## 2023-05-01 RX ORDER — BUSPIRONE HYDROCHLORIDE 10 MG/1
TABLET ORAL
Qty: 90 TABLET | Refills: 3 | Status: SHIPPED | OUTPATIENT
Start: 2023-05-01

## 2023-05-02 ENCOUNTER — TELEPHONE (OUTPATIENT)
Dept: NEUROLOGY | Age: 48
End: 2023-05-02

## 2023-06-05 DIAGNOSIS — F98.8 OTHER SPECIFIED BEHAVIORAL AND EMOTIONAL DISORDERS WITH ONSET USUALLY OCCURRING IN CHILDHOOD AND ADOLESCENCE: Primary | ICD-10-CM

## 2023-06-05 RX ORDER — METHYLPHENIDATE HYDROCHLORIDE 5 MG/1
5 TABLET ORAL 2 TIMES DAILY
Qty: 168 TABLET | Refills: 0 | Status: SHIPPED | OUTPATIENT
Start: 2023-06-05 | End: 2023-08-28

## 2023-06-05 NOTE — TELEPHONE ENCOUNTER
Good morning VANNESSA Ojeda! Its Kady Owens, I am needing a refill of my Ritalin 5mg.      Thank you,  Kady Owens    Last office visit 2/27/2023  Last med refill 3/6/2023

## 2023-06-09 NOTE — TELEPHONE ENCOUNTER
Confirmed patient id and advised that the script was done but it was printed for some reason. Advised that it is faxed now.

## 2023-07-28 RX ORDER — NARATRIPTAN 2.5 MG/1
TABLET ORAL
Qty: 8 TABLET | Refills: 3 | Status: SHIPPED | OUTPATIENT
Start: 2023-07-28

## 2023-08-21 DIAGNOSIS — F41.9 ANXIETY: Primary | ICD-10-CM

## 2023-08-21 RX ORDER — BUSPIRONE HYDROCHLORIDE 10 MG/1
10 TABLET ORAL 3 TIMES DAILY
Qty: 90 TABLET | Refills: 0 | Status: SHIPPED | OUTPATIENT
Start: 2023-08-21

## 2023-08-21 NOTE — TELEPHONE ENCOUNTER
Requested Prescriptions     Pending Prescriptions Disp Refills    busPIRone (BUSPAR) 10 MG tablet 90 tablet 0     Sig: Take 1 tablet by mouth 3 times daily Must established care with a provider for further refills       RX refill request from the patient/pharmacy. Patient last seen 3/23/23 without labs, and next appt. scheduled for no future appointments.

## 2023-09-12 ENCOUNTER — OFFICE VISIT (OUTPATIENT)
Age: 48
End: 2023-09-12
Payer: COMMERCIAL

## 2023-09-12 VITALS
OXYGEN SATURATION: 98 % | BODY MASS INDEX: 29.99 KG/M2 | HEIGHT: 65 IN | RESPIRATION RATE: 16 BRPM | WEIGHT: 180 LBS | DIASTOLIC BLOOD PRESSURE: 54 MMHG | TEMPERATURE: 97.7 F | HEART RATE: 76 BPM | SYSTOLIC BLOOD PRESSURE: 96 MMHG

## 2023-09-12 DIAGNOSIS — G43.719 CHRONIC MIGRAINE WITHOUT AURA, INTRACTABLE, WITHOUT STATUS MIGRAINOSUS: Primary | ICD-10-CM

## 2023-09-12 DIAGNOSIS — F90.0 ATTENTION DEFICIT HYPERACTIVITY DISORDER (ADHD), PREDOMINANTLY INATTENTIVE TYPE: ICD-10-CM

## 2023-09-12 DIAGNOSIS — M48.062 SPINAL STENOSIS OF LUMBAR REGION WITH NEUROGENIC CLAUDICATION: ICD-10-CM

## 2023-09-12 PROBLEM — F98.8 OTHER SPECIFIED BEHAVIORAL AND EMOTIONAL DISORDERS WITH ONSET USUALLY OCCURRING IN CHILDHOOD AND ADOLESCENCE: Status: ACTIVE | Noted: 2021-09-16

## 2023-09-12 PROCEDURE — 99215 OFFICE O/P EST HI 40 MIN: CPT | Performed by: PSYCHIATRY & NEUROLOGY

## 2023-09-12 PROCEDURE — 3078F DIAST BP <80 MM HG: CPT | Performed by: PSYCHIATRY & NEUROLOGY

## 2023-09-12 PROCEDURE — 3074F SYST BP LT 130 MM HG: CPT | Performed by: PSYCHIATRY & NEUROLOGY

## 2023-09-12 RX ORDER — METHYLPHENIDATE HYDROCHLORIDE 5 MG/1
5 TABLET ORAL 2 TIMES DAILY
Qty: 60 TABLET | Refills: 0 | Status: SHIPPED | OUTPATIENT
Start: 2023-11-11 | End: 2023-12-11

## 2023-09-12 RX ORDER — ERENUMAB-AOOE 140 MG/ML
INJECTION, SOLUTION SUBCUTANEOUS
Qty: 1 ML | Refills: 11 | Status: SHIPPED | OUTPATIENT
Start: 2023-09-12

## 2023-09-12 RX ORDER — METHYLPHENIDATE HYDROCHLORIDE 5 MG/1
5 TABLET ORAL 2 TIMES DAILY
Qty: 60 TABLET | Refills: 0 | Status: SHIPPED | OUTPATIENT
Start: 2023-09-12 | End: 2023-10-12

## 2023-09-12 RX ORDER — METHYLPHENIDATE HYDROCHLORIDE 5 MG/1
5 TABLET ORAL 2 TIMES DAILY
Qty: 60 TABLET | Refills: 0 | Status: SHIPPED | OUTPATIENT
Start: 2023-10-12 | End: 2023-11-11

## 2023-09-12 ASSESSMENT — PATIENT HEALTH QUESTIONNAIRE - PHQ9
SUM OF ALL RESPONSES TO PHQ QUESTIONS 1-9: 0
SUM OF ALL RESPONSES TO PHQ QUESTIONS 1-9: 0
SUM OF ALL RESPONSES TO PHQ9 QUESTIONS 1 & 2: 0
SUM OF ALL RESPONSES TO PHQ QUESTIONS 1-9: 0
SUM OF ALL RESPONSES TO PHQ QUESTIONS 1-9: 0
2. FEELING DOWN, DEPRESSED OR HOPELESS: 0
1. LITTLE INTEREST OR PLEASURE IN DOING THINGS: 0

## 2023-09-12 NOTE — PATIENT INSTRUCTIONS
As per discussion we have given you a sample of Aimovig and I have sent a new prescription to your pharmacy with a prior authorization request to our PA team    Regarding the naratriptan: If you need to  a prescription sooner than 30 days use good Rx and make sure your pharmacy knows that that is what you are using so they do not run it through your insurance    Please make sure you always have an accurate list of your medications with you and go through it one by one with my nurse to make sure everything is correct and accurate        Office Policies    Phone calls/patient messages:  Please allow up to 24 hours for someone in the office to contact you about your call or message. Be mindful your provider may be out of the office or your message may require further review. We encourage you to use [email or web address] for your messages as this is a faster, more efficient way to communicate with our office    Medication Refills:  Prescription medications require up to 48 business hours to process. We encourage you to use [email or web address] for your refills. For controlled medications: Please allow up to 72 business hours to process. Certain medications may require you to  a written prescription at our office. NO narcotic/controlled medications will be prescribed after 4pm Monday through Friday or on weekends    Form/Paperwork Completion:  We ask that you allow 7-14 business days. You may also download your forms to [email or web address] to have your doctor print off.

## 2023-09-12 NOTE — ASSESSMENT & PLAN NOTE
Patient is maintained chronically on Ritalin 5 mg twice daily  I am taking over this prescription from the prior neurologist that was here in our practice  A new prescription for 90-day supply was sent to her pharmacy  If she is going to need adjustments going forward she will need to go under the care of mental health but for now we will continue to maintain this prescription

## 2023-09-12 NOTE — PROGRESS NOTES
[address]  In Office FOLLOW-UP VISIT         Grisel Owens is a 57 y.o.  female who presents today for the following:  Chief Complaint   Patient presents with    Follow-up     Follow up migraines. Once she runs out of her emergency pill of ubrelvy         ASSESSMENT AND PLAN  1. Chronic migraine without aura, intractable, without status migrainosus  Assessment & Plan:  Since starting CGRP there is been a reduction in frequency intensity and duration as well as a reduction in rescue medication utilization and a reduction in this time at work/personal life    A new prescription for Aimovig 140 mg monthly was sent to her pharmacy and request for prior authorization to be completed by her PA team    For what ever reason patient cannot get Lonni Louise due to insurance barriers    She will continue on Amerge 2.5 mg as needed  If she needs more than 9 tablets/month she can get a refill using good [facility]    Sample was given to the pt today of Aimovig 140 mg due to interruption of prescription  Orders:  -     Erenumab-aooe (AIMOVIG) 140 MG/ML SOAJ; INJECT 1 ML UNDER THE SKIN EVERY 30 DAYS, Disp-1 mL, R-11Normal  -     Erenumab-aooe SOAJ 140 mg; 140 mg, SubCUTAneous, ONCE, On Tue 9/12/23 at 0930, For 1 dose  2. Attention deficit hyperactivity disorder (ADHD), predominantly inattentive type  Assessment & Plan:   Patient is maintained chronically on Ritalin 5 mg twice daily  I am taking over this prescription from the prior neurologist that was here in our practice  A new prescription for 90-day supply was sent to her pharmacy  If she is going to need adjustments going forward she will need to go under the care of mental health but for now we will continue to maintain this prescription  3.  Spinal stenosis of lumbar region with neurogenic claudication  Assessment & Plan:   Is followed by pain management at [facility] she is to continue under their care          Patient and/or family was given time

## 2023-09-12 NOTE — ASSESSMENT & PLAN NOTE
Since starting CGRP there is been a reduction in frequency intensity and duration as well as a reduction in rescue medication utilization and a reduction in this time at work/personal life    A new prescription for Aimovig 140 mg monthly was sent to her pharmacy and request for prior authorization to be completed by her PA team    For what ever reason patient cannot get Jules Joe due to insurance barriers    She will continue on Amerge 2.5 mg as needed  If she needs more than 9 tablets/month she can get a refill using good [facility]    Sample was given to the pt today of Aimovig 140 mg due to interruption of prescription

## 2023-09-18 DIAGNOSIS — F41.9 ANXIETY: ICD-10-CM

## 2023-09-18 RX ORDER — BUSPIRONE HYDROCHLORIDE 10 MG/1
10 TABLET ORAL 3 TIMES DAILY
Qty: 90 TABLET | Refills: 0 | Status: SHIPPED | OUTPATIENT
Start: 2023-09-18

## 2023-09-18 NOTE — TELEPHONE ENCOUNTER
PCP: Giovanna Horn MD    Last appt: Visit date not found  Future Appointments   Date Time Provider [address]   3/26/2024  8:30 AM JASVIR Rashid [facility]       Requested Prescriptions     Pending Prescriptions Disp Refills    busPIRone (BUSPAR) 10 MG tablet [Pharmacy Med Name: BUSPIRONE HCL 10 MG TABLET] 90 tablet 0     Sig: TAKE ONE TABLET BY MOUTH THREE TIMES A DAY       Prior labs and Blood pressures:  BP Readings from Last 3 Encounters:   09/12/23 (!) 96/54   03/23/23 130/72   02/27/23 104/74     Lab Results   Component Value Date/Time     09/16/2021 03:47 PM    K 4.8 09/16/2021 03:47 PM     09/16/2021 03:47 PM    CO2 30 09/16/2021 03:47 PM    BUN 11 09/16/2021 03:47 PM    GFRAA >60 09/16/2021 03:47 PM     No results found for: \"HBA1C\", \"XUG9CREF\"  Lab Results   Component Value Date/Time    CHOL 227 09/16/2021 03:47 PM    HDL 80 09/16/2021 03:47 PM    VLDL 28 07/09/2020 10:48 AM     No results found for: \"VITD3\", \"VD3RIA\"        Lab Results   Component Value Date/Time    TSH 1.82 09/16/2021 03:47 PM

## 2023-10-25 DIAGNOSIS — F41.9 ANXIETY: ICD-10-CM

## 2023-10-25 RX ORDER — BUSPIRONE HYDROCHLORIDE 10 MG/1
10 TABLET ORAL 3 TIMES DAILY
Qty: 90 TABLET | Refills: 3 | Status: SHIPPED | OUTPATIENT
Start: 2023-10-25

## 2023-10-25 NOTE — TELEPHONE ENCOUNTER
Last Refill: 9-18-23  Last Visit: 3-23-23 Celsa  Next Visit: Visit date not found     Requested Prescriptions     Pending Prescriptions Disp Refills    busPIRone (BUSPAR) 10 MG tablet 90 tablet 0     Sig: Take 1 tablet by mouth 3 times daily

## 2023-10-30 RX ORDER — VENLAFAXINE HYDROCHLORIDE 150 MG/1
CAPSULE, EXTENDED RELEASE ORAL
Qty: 90 CAPSULE | Refills: 3 | Status: SHIPPED | OUTPATIENT
Start: 2023-10-30

## 2023-12-08 DIAGNOSIS — F90.0 ATTENTION DEFICIT HYPERACTIVITY DISORDER (ADHD), PREDOMINANTLY INATTENTIVE TYPE: Primary | ICD-10-CM

## 2023-12-08 RX ORDER — NARATRIPTAN 2.5 MG/1
2.5 TABLET ORAL AS NEEDED
Qty: 8 TABLET | Refills: 3 | Status: SHIPPED | OUTPATIENT
Start: 2023-12-08

## 2023-12-08 RX ORDER — METHYLPHENIDATE HYDROCHLORIDE 5 MG/1
5 TABLET ORAL 2 TIMES DAILY
Qty: 60 TABLET | Refills: 0 | Status: SHIPPED | OUTPATIENT
Start: 2023-12-08 | End: 2024-01-07

## 2024-01-10 DIAGNOSIS — F90.0 ATTENTION DEFICIT HYPERACTIVITY DISORDER (ADHD), PREDOMINANTLY INATTENTIVE TYPE: ICD-10-CM

## 2024-01-10 RX ORDER — METHYLPHENIDATE HYDROCHLORIDE 5 MG/1
5 TABLET ORAL 2 TIMES DAILY
Qty: 60 TABLET | Refills: 0 | Status: SHIPPED | OUTPATIENT
Start: 2024-01-10 | End: 2024-02-09

## 2024-01-10 NOTE — TELEPHONE ENCOUNTER
Good morning!  I need a refill on my Ritalin 5mg 2per day   Thank you! I’m late getting a message to you about it,      Last office visit 9/12/2023  Next office visit 3/26/2024  Last med refill 11/11/2023

## 2024-02-12 DIAGNOSIS — F90.0 ATTENTION DEFICIT HYPERACTIVITY DISORDER (ADHD), PREDOMINANTLY INATTENTIVE TYPE: ICD-10-CM

## 2024-02-12 RX ORDER — METHYLPHENIDATE HYDROCHLORIDE 5 MG/1
5 TABLET ORAL 2 TIMES DAILY
Qty: 60 TABLET | Refills: 0 | Status: SHIPPED | OUTPATIENT
Start: 2024-02-12 | End: 2024-03-13

## 2024-02-12 NOTE — TELEPHONE ENCOUNTER
Good morning!  I need a refill on my Ritalin 5mg x2 per day. I was looking for it in my medication list and it’s missing! I’m not sure how that happened. I know that when I tried to get into my MyChart originally, [facility] had to be added again…     Thank you!  Kady Owens     Last office visit 9/12/2023  Next office visit 3/26/2024  Last med refill 1/10/2024

## 2024-02-21 DIAGNOSIS — F41.9 ANXIETY: ICD-10-CM

## 2024-02-21 RX ORDER — BUSPIRONE HYDROCHLORIDE 10 MG/1
10 TABLET ORAL 3 TIMES DAILY
Qty: 90 TABLET | Refills: 3 | Status: SHIPPED | OUTPATIENT
Start: 2024-02-21

## 2024-02-21 NOTE — TELEPHONE ENCOUNTER
Last Refill: 10-25-23  Last Visit: 3-23-23 Celsa (acute visit)  Next Visit: Visit date not found     Requested Prescriptions     Pending Prescriptions Disp Refills    busPIRone (BUSPAR) 10 MG tablet [Pharmacy Med Name: BUSPIRONE HCL 10 MG TABLET] 90 tablet 3     Sig: TAKE ONE TABLET BY MOUTH THREE TIMES A DAY

## 2024-03-11 DIAGNOSIS — F90.0 ATTENTION DEFICIT HYPERACTIVITY DISORDER (ADHD), PREDOMINANTLY INATTENTIVE TYPE: ICD-10-CM

## 2024-03-13 DIAGNOSIS — F90.0 ATTENTION DEFICIT HYPERACTIVITY DISORDER (ADHD), PREDOMINANTLY INATTENTIVE TYPE: ICD-10-CM

## 2024-03-13 DIAGNOSIS — G43.719 CHRONIC MIGRAINE WITHOUT AURA, INTRACTABLE, WITHOUT STATUS MIGRAINOSUS: Primary | ICD-10-CM

## 2024-03-13 RX ORDER — METHYLPHENIDATE HYDROCHLORIDE 5 MG/1
5 TABLET ORAL 2 TIMES DAILY
Qty: 60 TABLET | Refills: 0 | OUTPATIENT
Start: 2024-03-13 | End: 2024-04-12

## 2024-03-13 RX ORDER — NARATRIPTAN 2.5 MG/1
2.5 TABLET ORAL AS NEEDED
Qty: 8 TABLET | Refills: 3 | Status: SHIPPED | OUTPATIENT
Start: 2024-03-13

## 2024-03-13 RX ORDER — METHYLPHENIDATE HYDROCHLORIDE 5 MG/1
5 TABLET ORAL 2 TIMES DAILY
Qty: 60 TABLET | Refills: 0 | Status: SHIPPED | OUTPATIENT
Start: 2024-03-13 | End: 2024-04-12

## 2024-03-13 NOTE — TELEPHONE ENCOUNTER
Last office visit 9/12/2023  Next office visit 3/26/2024  Last med refill  Naratriptan 12/8/2023  Methylphenidate 2/12/20204

## 2024-03-26 ENCOUNTER — OFFICE VISIT (OUTPATIENT)
Age: 49
End: 2024-03-26
Payer: COMMERCIAL

## 2024-03-26 VITALS
TEMPERATURE: 98 F | OXYGEN SATURATION: 100 % | BODY MASS INDEX: 30.12 KG/M2 | HEIGHT: 65 IN | DIASTOLIC BLOOD PRESSURE: 78 MMHG | HEART RATE: 70 BPM | WEIGHT: 180.8 LBS | SYSTOLIC BLOOD PRESSURE: 116 MMHG | RESPIRATION RATE: 20 BRPM

## 2024-03-26 DIAGNOSIS — F90.0 ATTENTION DEFICIT HYPERACTIVITY DISORDER (ADHD), PREDOMINANTLY INATTENTIVE TYPE: ICD-10-CM

## 2024-03-26 DIAGNOSIS — G43.719 CHRONIC MIGRAINE WITHOUT AURA, INTRACTABLE, WITHOUT STATUS MIGRAINOSUS: Primary | ICD-10-CM

## 2024-03-26 PROBLEM — J01.00 ACUTE NON-RECURRENT MAXILLARY SINUSITIS: Status: RESOLVED | Noted: 2022-08-09 | Resolved: 2024-03-26

## 2024-03-26 PROCEDURE — 3074F SYST BP LT 130 MM HG: CPT | Performed by: PSYCHIATRY & NEUROLOGY

## 2024-03-26 PROCEDURE — 99214 OFFICE O/P EST MOD 30 MIN: CPT | Performed by: PSYCHIATRY & NEUROLOGY

## 2024-03-26 PROCEDURE — 3078F DIAST BP <80 MM HG: CPT | Performed by: PSYCHIATRY & NEUROLOGY

## 2024-03-26 RX ORDER — ESTRADIOL 1 MG/1
1 TABLET ORAL DAILY
COMMUNITY
Start: 2024-02-14

## 2024-03-26 RX ORDER — TRAMADOL HYDROCHLORIDE 300 MG/1
300 TABLET, EXTENDED RELEASE ORAL DAILY
COMMUNITY
Start: 2024-02-29

## 2024-03-26 NOTE — ASSESSMENT & PLAN NOTE
Patient is maintained chronically on Ritalin 5 mg twice daily  I am taking over this prescription from the prior neurologist that was here in our practice    If she is going to need adjustments going forward she will need to go under the care of mental health but for now we will continue to maintain this prescription

## 2024-03-26 NOTE — PROGRESS NOTES
Chief Complaint   Patient presents with    6 Month Follow-Up     Migraines         /78 (Site: Left Upper Arm)   Pulse 70   Temp 98 °F (36.7 °C)   Resp 20   Ht 1.651 m (5' 5\")   Wt 82 kg (180 lb 12.8 oz)   SpO2 100%   BMI 30.09 kg/m²      1. Have you been to the ER, urgent care clinic since your last visit?  Hospitalized since your last visit? no    2. Have you seen or consulted any other health care providers outside of the [facility] since your last visit?  Include any pap smears or colon screening. no    Health Maintenance Due   Topic Date Due    Hepatitis B vaccine (1 of 3 - 3-dose series) Never done    HIV screen  Never done    DTaP/Tdap/Td vaccine (1 - Tdap) Never done    Cervical cancer screen  Never done    Pneumococcal 0-64 years Vaccine (2 of 2 - PCV) 04/27/2020    Flu vaccine (1) 08/01/2023    COVID-19 Vaccine (4 - 2023-24 season) 09/01/2023             No data to display                 Failed to redirect to the Timeline version of the [employer] Smart[employer].    Failed to redirect to the Timeline version of the [employer] SmartLink.

## 2024-03-26 NOTE — PROGRESS NOTES
[facility]  In Office FOLLOW-UP VISIT         Kady Owens is a 48 y.o.  female who presents today for the following:  Chief Complaint   Patient presents with    6 Month Follow-Up     Migraines          ASSESSMENT AND PLAN  1. Chronic migraine without aura, intractable, without status migrainosus  Assessment & Plan:  Since starting CGRP there is been a reduction in frequency intensity and duration as well as a reduction in rescue medication utilization and a reduction in this time at work/personal life    She is to continue on Aimovig 140 mg monthly along with propranolol long-acting 60 mg/day and venlafaxine 150 mg/day    For rescue medication she continues on naratriptan 2.5 mg as needed    For what ever reason patient cannot get Ubrelvy due to insurance barriers      2. Attention deficit hyperactivity disorder (ADHD), predominantly inattentive type  Assessment & Plan:   Patient is maintained chronically on Ritalin 5 mg twice daily  I am taking over this prescription from the prior neurologist that was here in our practice    If she is going to need adjustments going forward she will need to go under the care of mental health but for now we will continue to maintain this prescription            Patient and/or family was given time to ask questions and voice concerns. I believe all questions concerns were adequately addressed at this  office visit.  Patient and/or family also verbalized agreement and understanding of the above-stated plan    Complex neurologic decision making secondary any or all of the following to include unclear etiology, and /or polypharmacy, and/or significant comorbid conditions, and/or use of high-risk medications which complicate the decision making process related to patient's neurologic diagnosis      Return in about 6 months (around 9/26/2024) for VV.      ICD-10-CM    1. Chronic migraine without aura, intractable, without status migrainosus

## 2024-03-26 NOTE — PATIENT INSTRUCTIONS
As a reminder:   Please come to your appointment 15 minutes before your office appointment.  This way, you can get checked in at the  and checked in by the nursing staff so you have the full allotment of time with your provider for your visit.  Please bring an up-to-date and accurate list of all your medications.  Or bring all your active prescription bottles with you at the time of your office visit and this includes over-the-counter medications so we can make sure that your medication list is up-to-date.        As per discussion  Overall you are doing well we will make no changes just have your pharmacy send us your refill request as needed    I do hope you get to the [facility] and I hope you have a very happy [facility]        Office Policies    Phone calls/patient messages:  Please allow up to 48 hours for someone in the office to contact you about your call or message. Be mindful your provider may be out of the office or your message may require further review. We encourage you to use [employer] for your messages as this is a faster, more efficient way to communicate with our office    Medication Refills:  Prescription medications require up to 48 business hours to process. We encourage you to use [employer] for your refills.     For controlled medications: Please allow up to 72 business hours to process. Certain medications may require you to  a written prescription at our office.    NO narcotic/controlled medications will be prescribed after 4pm Monday through Friday or on weekends    Form/Paperwork Completion:  We ask that you allow 7-14 business days. You may also download your forms to [employer] to have your doctor print off.

## 2024-03-26 NOTE — ASSESSMENT & PLAN NOTE
Since starting CGRP there is been a reduction in frequency intensity and duration as well as a reduction in rescue medication utilization and a reduction in this time at work/personal life    She is to continue on Aimovig 140 mg monthly along with propranolol long-acting 60 mg/day and venlafaxine 150 mg/day    For rescue medication she continues on naratriptan 2.5 mg as needed    For what ever reason patient cannot get Ubrelvy due to insurance barriers

## 2024-04-10 DIAGNOSIS — F90.0 ATTENTION DEFICIT HYPERACTIVITY DISORDER (ADHD), PREDOMINANTLY INATTENTIVE TYPE: ICD-10-CM

## 2024-04-11 ENCOUNTER — PATIENT MESSAGE (OUTPATIENT)
Age: 49
End: 2024-04-11

## 2024-04-11 RX ORDER — METHYLPHENIDATE HYDROCHLORIDE 5 MG/1
5 TABLET ORAL 2 TIMES DAILY
Qty: 60 TABLET | Refills: 0 | Status: SHIPPED | OUTPATIENT
Start: 2024-04-11 | End: 2024-05-11

## 2024-04-12 NOTE — TELEPHONE ENCOUNTER
From: Kady Owens  To: Maida Ojeda  Sent: 4/11/2024 11:07 AM EDT  Subject: Refill needed for Kady Owens needs a refill of her Ritalin 5mg for April     Thanks,  Kady Owens   [phone]

## 2024-05-16 DIAGNOSIS — F90.0 ADHD, PREDOMINANTLY INATTENTIVE TYPE: Primary | ICD-10-CM

## 2024-05-16 RX ORDER — METHYLPHENIDATE HYDROCHLORIDE 5 MG/1
5 TABLET ORAL 2 TIMES DAILY
Qty: 60 TABLET | Refills: 0 | Status: SHIPPED | OUTPATIENT
Start: 2024-05-16 | End: 2024-06-15

## 2024-05-16 RX ORDER — METHYLPHENIDATE HYDROCHLORIDE 5 MG/1
5 TABLET ORAL 2 TIMES DAILY
COMMUNITY
End: 2024-05-16 | Stop reason: SDUPTHER

## 2024-05-28 ENCOUNTER — TELEPHONE (OUTPATIENT)
Age: 49
End: 2024-05-28

## 2024-05-30 ENCOUNTER — TELEPHONE (OUTPATIENT)
Age: 49
End: 2024-05-30

## 2024-05-30 NOTE — TELEPHONE ENCOUNTER
Aimovig approval  140 mg/ ml     he request has been approved. The authorization is effective from 05/28/2024 to 05/27/2025, as long as the member is enrolled in their current health plan.   Approval Details    Authorized from May 28, 2024 to May 27, 2025        Rt Fax sent to Karl   [phone]

## 2024-06-12 ENCOUNTER — PATIENT MESSAGE (OUTPATIENT)
Age: 49
End: 2024-06-12

## 2024-06-12 DIAGNOSIS — F90.0 ADHD, PREDOMINANTLY INATTENTIVE TYPE: ICD-10-CM

## 2024-06-12 RX ORDER — METHYLPHENIDATE HYDROCHLORIDE 5 MG/1
5 TABLET ORAL 2 TIMES DAILY
Qty: 60 TABLET | Refills: 0 | Status: SHIPPED | OUTPATIENT
Start: 2024-06-15 | End: 2024-07-15

## 2024-06-12 NOTE — TELEPHONE ENCOUNTER
From: Kady Owens  To: Maida Ojeda  Sent: 6/12/2024 6:17 AM EDT  Subject: Kady Owens needs a refill     Good morning!  Kady Owens needs a refill of her Ritalin 5mg. X 2 daily. It’s not due until the 15th but it is on the list of things that I need to take care of.  Thanks!  Kady Arandawhite

## 2024-06-27 DIAGNOSIS — G43.719 CHRONIC MIGRAINE WITHOUT AURA, INTRACTABLE, WITHOUT STATUS MIGRAINOSUS: ICD-10-CM

## 2024-06-27 RX ORDER — NARATRIPTAN 2.5 MG/1
TABLET ORAL
Qty: 8 TABLET | Refills: 3 | Status: SHIPPED | OUTPATIENT
Start: 2024-06-27

## 2024-07-15 DIAGNOSIS — F90.0 ADHD, PREDOMINANTLY INATTENTIVE TYPE: ICD-10-CM

## 2024-07-15 RX ORDER — METHYLPHENIDATE HYDROCHLORIDE 5 MG/1
5 TABLET ORAL 2 TIMES DAILY
Qty: 60 TABLET | Refills: 0 | Status: SHIPPED | OUTPATIENT
Start: 2024-07-15 | End: 2024-08-14

## 2024-07-15 NOTE — TELEPHONE ENCOUNTER
Good afternoon,   I need a refill of Ritalin 5mg  Thanks!  Kady Owens     Last office visit 3/26/2024  Next office visit 10/04/2024  Last med refill 6/15/2024

## 2024-08-15 DIAGNOSIS — F90.0 ADHD, PREDOMINANTLY INATTENTIVE TYPE: ICD-10-CM

## 2024-08-15 RX ORDER — METHYLPHENIDATE HYDROCHLORIDE 5 MG/1
5 TABLET ORAL 2 TIMES DAILY
Qty: 60 TABLET | Refills: 0 | Status: SHIPPED | OUTPATIENT
Start: 2024-08-15 | End: 2024-09-14

## 2024-08-15 NOTE — TELEPHONE ENCOUNTER
Good morning!  I’m in need of a refill of my prescription for my Ritalin 5mg twice daily…  Thank you!  Kady Owens     Last office visit 3/26/2024  Next office visit 10/4/2024  Last med refill 7/15/2024

## 2024-08-22 RX ORDER — VENLAFAXINE HYDROCHLORIDE 150 MG/1
150 CAPSULE, EXTENDED RELEASE ORAL DAILY
Qty: 90 CAPSULE | Refills: 3 | Status: SHIPPED | OUTPATIENT
Start: 2024-08-22

## 2024-09-12 DIAGNOSIS — F90.0 ADHD, PREDOMINANTLY INATTENTIVE TYPE: ICD-10-CM

## 2024-09-12 RX ORDER — METHYLPHENIDATE HYDROCHLORIDE 5 MG/1
5 TABLET ORAL 2 TIMES DAILY
Qty: 60 TABLET | Refills: 0 | Status: SHIPPED | OUTPATIENT
Start: 2024-09-12 | End: 2024-10-12

## 2024-10-04 ENCOUNTER — TELEMEDICINE (OUTPATIENT)
Age: 49
End: 2024-10-04
Payer: COMMERCIAL

## 2024-10-04 DIAGNOSIS — G43.719 CHRONIC MIGRAINE WITHOUT AURA, INTRACTABLE, WITHOUT STATUS MIGRAINOSUS: Primary | ICD-10-CM

## 2024-10-04 DIAGNOSIS — F90.0 ATTENTION DEFICIT HYPERACTIVITY DISORDER (ADHD), PREDOMINANTLY INATTENTIVE TYPE: ICD-10-CM

## 2024-10-04 DIAGNOSIS — M48.062 SPINAL STENOSIS OF LUMBAR REGION WITH NEUROGENIC CLAUDICATION: ICD-10-CM

## 2024-10-04 PROBLEM — G43.701 CHRONIC MIGRAINE WITHOUT AURA WITH STATUS MIGRAINOSUS, NOT INTRACTABLE: Status: RESOLVED | Noted: 2018-05-01 | Resolved: 2024-10-04

## 2024-10-04 PROCEDURE — 99214 OFFICE O/P EST MOD 30 MIN: CPT | Performed by: PSYCHIATRY & NEUROLOGY

## 2024-10-04 RX ORDER — ERENUMAB-AOOE 140 MG/ML
INJECTION, SOLUTION SUBCUTANEOUS
Qty: 1 ML | Refills: 11 | Status: SHIPPED | OUTPATIENT
Start: 2024-10-04

## 2024-10-04 ASSESSMENT — PATIENT HEALTH QUESTIONNAIRE - PHQ9
SUM OF ALL RESPONSES TO PHQ QUESTIONS 1-9: 0
1. LITTLE INTEREST OR PLEASURE IN DOING THINGS: NOT AT ALL
2. FEELING DOWN, DEPRESSED OR HOPELESS: NOT AT ALL
SUM OF ALL RESPONSES TO PHQ9 QUESTIONS 1 & 2: 0

## 2024-10-04 ASSESSMENT — ANXIETY QUESTIONNAIRES
4. TROUBLE RELAXING: NOT AT ALL
7. FEELING AFRAID AS IF SOMETHING AWFUL MIGHT HAPPEN: NOT AT ALL
5. BEING SO RESTLESS THAT IT IS HARD TO SIT STILL: NOT AT ALL
6. BECOMING EASILY ANNOYED OR IRRITABLE: NOT AT ALL
2. NOT BEING ABLE TO STOP OR CONTROL WORRYING: NOT AT ALL
1. FEELING NERVOUS, ANXIOUS, OR ON EDGE: NOT AT ALL
GAD7 TOTAL SCORE: 0
3. WORRYING TOO MUCH ABOUT DIFFERENT THINGS: NOT AT ALL

## 2024-10-04 NOTE — PROGRESS NOTES
1st call 8am [phone]  Left VM asking for call back to complete nursing portion    2nd call 8;20am [phone]  Spoke to patient verified with 2 patient     Patient stats she needs a new PA on the aimovig injectable,per chart PA is active until 5/27/25  states her Migraines have been great and when she does have them the triptan work well for her.   No verbalized questions   
   Specific Gravity, UA 09/16/2021 1.007  1.003 - 1.030   Final    pH, UA 09/16/2021 7.5  5.0 - 8.0   Final    Protein, UA 09/16/2021 Negative  Negative mg/dL Final    Glucose, Ur 09/16/2021 Negative  Negative mg/dL Final    Ketones, Urine 09/16/2021 Negative  Negative mg/dL Final    Bilirubin, Urine 09/16/2021 Negative  Negative   Final    Blood, Urine 09/16/2021 Negative  Negative   Final    Urobilinogen, UA, POCT 09/16/2021 0.2  0.2 - 1.0 EU/dL Final    Nitrite, Urine 09/16/2021 Negative  Negative   Final    Leukocyte Esterase, Urine 09/16/2021 Negative  Negative   Final    Urine Culture Reflex 09/16/2021 CULTURE NOT INDICATED BY UA RESULT  CULTURE NOT INDICATED BY UA RESULT   Final    WBC, UA 09/16/2021 0-4  0 - 4 /hpf Final    RBC, UA 09/16/2021 0-5  0 - 5 /hpf Final    Epithelial Cells, UA 09/16/2021 FEW  FEW /lpf Final    Comment: Epithelial cell category consists of squamous cells and /or transitional  urothelial cells. Renal tubular cells, if present, are separately identified as  such.      BACTERIA, URINE 09/16/2021 Negative  Negative /hpf Final    Hyaline Casts, UA 09/16/2021 0-2  0 - 5 /lpf Final    Cholesterol, Total 09/16/2021 227 (H)  <200 MG/DL Final    Triglycerides 09/16/2021 182 (H)  <150 MG/DL Final    Comment: Based on NCEP-ATP III:  Triglycerides <150 mg/dL  is considered normal, 150-199  mg/dL  borderline high,  200-499 mg/dL high and  greater than or equal to 500  mg/dL very high.      HDL 09/16/2021 80  MG/DL Final    Comment: Based on NCEP ATP III, HDL Cholesterol <40 mg/dL is considered low and >60  mg/dL is elevated.      LDL Calculated 09/16/2021 110.6 (H)  0 - 100 MG/DL Final    Comment: Based on the NCEP-ATP: LDL-C concentrations are considered  optimal <100 mg/dL,  near optimal/above Normal 100-129 mg/dL Borderline High: 130-159, High: 160-189  mg/dL Very High: Greater than or equal to 190 mg/dL      VLDL Cholesterol Calculated 09/16/2021 36.4  MG/DL Final    Chol/HDL Ratio

## 2024-10-04 NOTE — ASSESSMENT & PLAN NOTE
Is followed by pain management at [facility] she is to continue under their care patient reports presently stable and doing well

## 2024-10-04 NOTE — ASSESSMENT & PLAN NOTE
Patient is maintained chronically on Ritalin 5 mg twice daily  I have taken over the patient's care from her prior neurologist who is left the practice.  At that time Ritalin was being prescribed through our practice. I have agreed to continue the prescription.    Appears stable on this dose we will continue to maintain monthly prescriptions for Ritalin 5 mg twice daily    If she is going to need adjustments going forward she will need to go under the care of mental health but for now we will continue to maintain this prescription

## 2024-10-04 NOTE — PATIENT INSTRUCTIONS
As a reminder:   Please come to your appointment 15 minutes before your office appointment.  This way, you can get checked in at the  and checked in by the nursing staff so you have the full allotment of time with your provider for your visit.  Please bring an up-to-date and accurate list of all your medications.  Or bring all your active prescription bottles with you at the time of your office visit and this includes over-the-counter medications so we can make sure that your medication list is up-to-date.  If you are scheduled for a virtual visit, please be aware that the  will need to check you in and usually the day before to verify insurance and collect co-pays as appropriate.  Please be prepared for the second call which will be from the nurse to go over your medications and any other vital information.  This will probably be done 30 minutes prior to your visit.  The reason why we do this early is that you can get the full benefit of your appointment time with your provider.  Finally you will be given the link for your virtual visit please click into your link 10 minutes prior to your appointment and please wait patiently for the provider to join you        As per discussion  Congratulations on your daughter going to college and making a successful adjustment!        I am glad you are doing well in all domains we will continue to prescribe your medications of the Aimovig venlafaxine propranolol and Ritalin I sent a new prescription of the Aimovig to your pharmacy and you have prior authorization through July 2025    Will see you back in the springtime for another med check                  Office Policies    Phone calls/patient messages:  Please allow up to 72 hours  for someone in the office to contact you about your call or message. Be mindful your provider may be out of the office or your message may require further review. We encourage you to use [employer] for your messages as this is a

## 2024-10-15 DIAGNOSIS — F90.0 ADHD, PREDOMINANTLY INATTENTIVE TYPE: ICD-10-CM

## 2024-10-15 DIAGNOSIS — G43.719 CHRONIC MIGRAINE WITHOUT AURA, INTRACTABLE, WITHOUT STATUS MIGRAINOSUS: ICD-10-CM

## 2024-10-15 RX ORDER — NARATRIPTAN 2.5 MG/1
TABLET ORAL
Qty: 8 TABLET | Refills: 11 | Status: SHIPPED | OUTPATIENT
Start: 2024-10-15

## 2024-10-15 RX ORDER — METHYLPHENIDATE HYDROCHLORIDE 5 MG/1
5 TABLET ORAL 2 TIMES DAILY
Qty: 60 TABLET | Refills: 0 | Status: SHIPPED | OUTPATIENT
Start: 2024-10-15 | End: 2024-11-14

## 2024-10-15 NOTE — TELEPHONE ENCOUNTER
In addition to my Ritalin 5mg twice daily, I also need a refill of my naratriptan as needed for migraines.  Thank you!  Kady Owens          Last office visit 10/4/2024  Next office visit 5/2/2025  Last med refill  Naratriptan 6/27/2024  Ritalin 9/12/2024

## 2024-10-22 RX ORDER — VENLAFAXINE HYDROCHLORIDE 150 MG/1
150 CAPSULE, EXTENDED RELEASE ORAL DAILY
Qty: 90 CAPSULE | Refills: 3 | OUTPATIENT
Start: 2024-10-22

## 2024-10-22 NOTE — TELEPHONE ENCOUNTER
Last office visit   Next office visit  Last med refill 8/22/2024 effexor script is good for a year90 day plus 3 refills

## 2024-11-15 DIAGNOSIS — F90.0 ADHD, PREDOMINANTLY INATTENTIVE TYPE: Primary | ICD-10-CM

## 2024-11-15 RX ORDER — METHYLPHENIDATE HYDROCHLORIDE 5 MG/1
5 TABLET ORAL 2 TIMES DAILY
COMMUNITY
End: 2024-11-15 | Stop reason: SDUPTHER

## 2024-11-15 RX ORDER — METHYLPHENIDATE HYDROCHLORIDE 5 MG/1
5 TABLET ORAL 2 TIMES DAILY
Qty: 60 TABLET | Refills: 0 | Status: SHIPPED | OUTPATIENT
Start: 2024-11-15 | End: 2024-12-15

## 2024-11-15 NOTE — TELEPHONE ENCOUNTER
Received [employer] message from patient: \"Good afternoon!  I need a refill for my Ritalin 5mg 2xdaily.  Thank you!  Kady Owens\"    Last office visit 10/04/2024  Next office visit 05/02/2025  Last med refill 10/15/2024

## 2024-11-19 RX ORDER — PROPRANOLOL HYDROCHLORIDE 60 MG/1
60 CAPSULE, EXTENDED RELEASE ORAL DAILY
Qty: 90 CAPSULE | Refills: 3 | Status: SHIPPED | OUTPATIENT
Start: 2024-11-19

## 2024-12-13 DIAGNOSIS — F90.0 ADHD, PREDOMINANTLY INATTENTIVE TYPE: ICD-10-CM

## 2024-12-13 RX ORDER — METHYLPHENIDATE HYDROCHLORIDE 5 MG/1
TABLET ORAL
Qty: 180 TABLET | Refills: 0 | Status: SHIPPED | OUTPATIENT
Start: 2024-12-13 | End: 2025-03-13

## 2024-12-13 NOTE — TELEPHONE ENCOUNTER
Refills have been requested for the following medications:         methylphenidate (RITALIN) 5 MG tablet [Maida Ojeda, JASVIR]     Preferred pharmacy: [facility] [id number] [facility] [address] - P [phone] - F [phone]       Last office visit 10/4/2024  Next office visit 5/2/2025  Last med refill 11/15/2024

## 2025-03-10 DIAGNOSIS — F90.0 ADHD, PREDOMINANTLY INATTENTIVE TYPE: ICD-10-CM

## 2025-03-10 NOTE — TELEPHONE ENCOUNTER
Patient called to let us know that she took her last pill today so she will need this refilled asap.

## 2025-03-11 RX ORDER — METHYLPHENIDATE HYDROCHLORIDE 5 MG/1
TABLET ORAL
Qty: 180 TABLET | Refills: 0 | Status: SHIPPED | OUTPATIENT
Start: 2025-03-13 | End: 2025-06-11

## 2025-05-02 ENCOUNTER — TELEMEDICINE (OUTPATIENT)
Age: 50
End: 2025-05-02
Payer: COMMERCIAL

## 2025-05-02 DIAGNOSIS — F90.0 ATTENTION DEFICIT HYPERACTIVITY DISORDER (ADHD), PREDOMINANTLY INATTENTIVE TYPE: ICD-10-CM

## 2025-05-02 DIAGNOSIS — G43.719 CHRONIC MIGRAINE WITHOUT AURA, INTRACTABLE, WITHOUT STATUS MIGRAINOSUS: Primary | ICD-10-CM

## 2025-05-02 PROCEDURE — 99214 OFFICE O/P EST MOD 30 MIN: CPT | Performed by: PSYCHIATRY & NEUROLOGY

## 2025-05-02 ASSESSMENT — PATIENT HEALTH QUESTIONNAIRE - PHQ9
2. FEELING DOWN, DEPRESSED OR HOPELESS: NOT AT ALL
SUM OF ALL RESPONSES TO PHQ QUESTIONS 1-9: 0
1. LITTLE INTEREST OR PLEASURE IN DOING THINGS: NOT AT ALL
SUM OF ALL RESPONSES TO PHQ QUESTIONS 1-9: 0

## 2025-05-02 NOTE — ASSESSMENT & PLAN NOTE
Since starting CGRP there is been a reduction in frequency intensity and duration as well as a reduction in rescue medication utilization and a reduction in this time at work/personal life    She is to continue on Aimovig 140 mg monthly along with propranolol long-acting 60 mg/day and venlafaxine 150 mg/day    For rescue medication she continues on naratriptan 2.5 mg as needed  This seems to be effective for her as well she is using it about 2 times per month.    Previously she was on Ubrelvy but had to discontinue this due to insurance barriers but the naratriptan seems to be working well

## 2025-05-02 NOTE — PROGRESS NOTES
[facility]  [facility]  [address]  [place] [address]  Phone: [phone] fax: [phone]      Kady Owens, was evaluated through a synchronous (real-time) audio-video encounter. The patient (or guardian if applicable) is aware that this is a billable service, which includes applicable co-pays. This Virtual Visit was conducted with patient's (and/or legal guardian's) consent. Patient identification was verified, and a caregiver was present when appropriate.   The patient was located at Home: [address] [address]  Provider was located at Facility (Appt Dept): [address] [place], [place] [address]  Confirm you are appropriately licensed, registered, or certified to deliver care in the state where the patient is located as indicated above. If you are not or unsure, please re-schedule the visit: Yes, I confirm.     Kady Owens (:  1975) is a Established patient, presenting virtually for evaluation of the following:   Chief Complaint   Patient presents with    Follow-up     No concerns or issues today          Assessment & Plan     Below is the assessment and plan developed based on review of pertinent history, physical exam, labs, studies, and medications.  1. Chronic migraine without aura, intractable, without status migrainosus  Assessment & Plan:  Since starting CGRP there is been a reduction in frequency intensity and duration as well as a reduction in rescue medication utilization and a reduction in this time at work/personal life    She is to continue on Aimovig 140 mg monthly along with propranolol long-acting 60 mg/day and venlafaxine 150 mg/day    For rescue medication she continues on naratriptan 2.5 mg as needed  This seems to be effective for her as well she is using it about 2 times per month.    Previously she was on Ubrelvy but had to discontinue this due to

## 2025-05-02 NOTE — PATIENT INSTRUCTIONS
As per discussion  Overall you are doing really well continue on all your medications unchanged and just asked the pharmacy to send this refill request when due.    I truly am glad that your medical conditions are doing well from our perspective both of the ADD and especially with the migraine management.    Normally I see you back in 6 months but my schedule is backed up so we will see you in January which is more than 8-month follow-up    I hope you have a great rest of your year and holiday season and I will see you back after the new year            As a reminder:   Please come to your appointment 15 minutes before your office appointment.  This way, you can get checked in at the  and checked in by the nursing staff so you have the full allotment of time with your provider for your visit.  Please bring an up-to-date and accurate list of all your medications.  Or bring all your active prescription bottles with you at the time of your office visit and this includes over-the-counter medications so we can make sure that your medication list is up-to-date.  If you are scheduled for a virtual visit, please be aware that the  will need to check you in and usually the day before to verify insurance and collect co-pays as appropriate.  Please be prepared for the second call which will be from the nurse to go over your medications and any other vital information.  This will probably be done 30 minutes prior to your visit.  The reason why we do this early is that you can get the full benefit of your appointment time with your provider.  Finally you will be given the link for your virtual visit please click into your link 10 minutes prior to your appointment and please wait patiently for the provider to join you            Office Policies    Phone calls/patient messages:  Please allow up to 72 hours  for someone in the office to contact you about your call or message. Be mindful your provider may be

## 2025-05-27 RX ORDER — VENLAFAXINE HYDROCHLORIDE 150 MG/1
150 CAPSULE, EXTENDED RELEASE ORAL DAILY
Qty: 90 CAPSULE | Refills: 3 | Status: SHIPPED | OUTPATIENT
Start: 2025-05-27

## 2025-06-09 DIAGNOSIS — F90.0 ADHD, PREDOMINANTLY INATTENTIVE TYPE: ICD-10-CM

## 2025-06-09 RX ORDER — METHYLPHENIDATE HYDROCHLORIDE 5 MG/1
TABLET ORAL
Qty: 180 TABLET | Refills: 0 | Status: SHIPPED | OUTPATIENT
Start: 2025-06-09 | End: 2025-09-07

## 2025-07-23 ENCOUNTER — TELEPHONE (OUTPATIENT)
Age: 50
End: 2025-07-23

## 2025-07-23 NOTE — TELEPHONE ENCOUNTER
Aimovig approval    Authorized from July 23, 2025 to July 22, 2026  Information received electronically from payer    Rt fax to [facility]  [phone]